# Patient Record
Sex: FEMALE | Race: WHITE | NOT HISPANIC OR LATINO | URBAN - METROPOLITAN AREA
[De-identification: names, ages, dates, MRNs, and addresses within clinical notes are randomized per-mention and may not be internally consistent; named-entity substitution may affect disease eponyms.]

---

## 2017-08-31 ENCOUNTER — EMERGENCY (EMERGENCY)
Facility: HOSPITAL | Age: 82
LOS: 1 days | End: 2017-08-31
Attending: EMERGENCY MEDICINE | Admitting: EMERGENCY MEDICINE
Payer: MEDICARE

## 2017-08-31 VITALS
SYSTOLIC BLOOD PRESSURE: 211 MMHG | HEART RATE: 90 BPM | RESPIRATION RATE: 16 BRPM | TEMPERATURE: 98 F | DIASTOLIC BLOOD PRESSURE: 97 MMHG | OXYGEN SATURATION: 99 %

## 2017-08-31 VITALS
DIASTOLIC BLOOD PRESSURE: 69 MMHG | OXYGEN SATURATION: 96 % | HEART RATE: 76 BPM | SYSTOLIC BLOOD PRESSURE: 175 MMHG | RESPIRATION RATE: 16 BRPM

## 2017-08-31 LAB
ALBUMIN SERPL ELPH-MCNC: 3.9 G/DL — SIGNIFICANT CHANGE UP (ref 3.3–5)
ALP SERPL-CCNC: 109 U/L — SIGNIFICANT CHANGE UP (ref 40–120)
ALT FLD-CCNC: 19 U/L RC — SIGNIFICANT CHANGE UP (ref 10–45)
ANION GAP SERPL CALC-SCNC: 13 MMOL/L — SIGNIFICANT CHANGE UP (ref 5–17)
AST SERPL-CCNC: 21 U/L — SIGNIFICANT CHANGE UP (ref 10–40)
BASOPHILS # BLD AUTO: 0 K/UL — SIGNIFICANT CHANGE UP (ref 0–0.2)
BASOPHILS NFR BLD AUTO: 0.5 % — SIGNIFICANT CHANGE UP (ref 0–2)
BILIRUB SERPL-MCNC: 0.3 MG/DL — SIGNIFICANT CHANGE UP (ref 0.2–1.2)
BUN SERPL-MCNC: 21 MG/DL — SIGNIFICANT CHANGE UP (ref 7–23)
CALCIUM SERPL-MCNC: 9.7 MG/DL — SIGNIFICANT CHANGE UP (ref 8.4–10.5)
CHLORIDE SERPL-SCNC: 104 MMOL/L — SIGNIFICANT CHANGE UP (ref 96–108)
CO2 SERPL-SCNC: 26 MMOL/L — SIGNIFICANT CHANGE UP (ref 22–31)
CREAT SERPL-MCNC: 1 MG/DL — SIGNIFICANT CHANGE UP (ref 0.5–1.3)
EOSINOPHIL # BLD AUTO: 0.1 K/UL — SIGNIFICANT CHANGE UP (ref 0–0.5)
EOSINOPHIL NFR BLD AUTO: 2.6 % — SIGNIFICANT CHANGE UP (ref 0–6)
GLUCOSE SERPL-MCNC: 121 MG/DL — HIGH (ref 70–99)
HCT VFR BLD CALC: 35.1 % — SIGNIFICANT CHANGE UP (ref 34.5–45)
HGB BLD-MCNC: 10.8 G/DL — LOW (ref 11.5–15.5)
LYMPHOCYTES # BLD AUTO: 1.1 K/UL — SIGNIFICANT CHANGE UP (ref 1–3.3)
LYMPHOCYTES # BLD AUTO: 20.7 % — SIGNIFICANT CHANGE UP (ref 13–44)
MCHC RBC-ENTMCNC: 27.4 PG — SIGNIFICANT CHANGE UP (ref 27–34)
MCHC RBC-ENTMCNC: 30.6 GM/DL — LOW (ref 32–36)
MCV RBC AUTO: 89.5 FL — SIGNIFICANT CHANGE UP (ref 80–100)
MONOCYTES # BLD AUTO: 0.6 K/UL — SIGNIFICANT CHANGE UP (ref 0–0.9)
MONOCYTES NFR BLD AUTO: 11.7 % — SIGNIFICANT CHANGE UP (ref 2–14)
NEUTROPHILS # BLD AUTO: 3.4 K/UL — SIGNIFICANT CHANGE UP (ref 1.8–7.4)
NEUTROPHILS NFR BLD AUTO: 64.6 % — SIGNIFICANT CHANGE UP (ref 43–77)
PLATELET # BLD AUTO: 187 K/UL — SIGNIFICANT CHANGE UP (ref 150–400)
POTASSIUM SERPL-MCNC: 4.5 MMOL/L — SIGNIFICANT CHANGE UP (ref 3.5–5.3)
POTASSIUM SERPL-SCNC: 4.5 MMOL/L — SIGNIFICANT CHANGE UP (ref 3.5–5.3)
PROT SERPL-MCNC: 7.2 G/DL — SIGNIFICANT CHANGE UP (ref 6–8.3)
RBC # BLD: 3.92 M/UL — SIGNIFICANT CHANGE UP (ref 3.8–5.2)
RBC # FLD: 12.8 % — SIGNIFICANT CHANGE UP (ref 10.3–14.5)
SODIUM SERPL-SCNC: 143 MMOL/L — SIGNIFICANT CHANGE UP (ref 135–145)
WBC # BLD: 5.3 K/UL — SIGNIFICANT CHANGE UP (ref 3.8–10.5)
WBC # FLD AUTO: 5.3 K/UL — SIGNIFICANT CHANGE UP (ref 3.8–10.5)

## 2017-08-31 PROCEDURE — 72125 CT NECK SPINE W/O DYE: CPT | Mod: 26

## 2017-08-31 PROCEDURE — 80053 COMPREHEN METABOLIC PANEL: CPT

## 2017-08-31 PROCEDURE — 99284 EMERGENCY DEPT VISIT MOD MDM: CPT | Mod: 25

## 2017-08-31 PROCEDURE — 93005 ELECTROCARDIOGRAM TRACING: CPT | Mod: XU

## 2017-08-31 PROCEDURE — 85027 COMPLETE CBC AUTOMATED: CPT

## 2017-08-31 PROCEDURE — 12011 RPR F/E/E/N/L/M 2.5 CM/<: CPT

## 2017-08-31 PROCEDURE — 70450 CT HEAD/BRAIN W/O DYE: CPT

## 2017-08-31 PROCEDURE — 12011 RPR F/E/E/N/L/M 2.5 CM/<: CPT | Mod: GC

## 2017-08-31 PROCEDURE — 72125 CT NECK SPINE W/O DYE: CPT

## 2017-08-31 PROCEDURE — 70450 CT HEAD/BRAIN W/O DYE: CPT | Mod: 26

## 2017-08-31 PROCEDURE — 71045 X-RAY EXAM CHEST 1 VIEW: CPT

## 2017-08-31 PROCEDURE — 90471 IMMUNIZATION ADMIN: CPT

## 2017-08-31 PROCEDURE — 93010 ELECTROCARDIOGRAM REPORT: CPT | Mod: 59

## 2017-08-31 PROCEDURE — 71010: CPT | Mod: 26

## 2017-08-31 PROCEDURE — 90715 TDAP VACCINE 7 YRS/> IM: CPT

## 2017-08-31 RX ORDER — TETANUS TOXOID, REDUCED DIPHTHERIA TOXOID AND ACELLULAR PERTUSSIS VACCINE, ADSORBED 5; 2.5; 8; 8; 2.5 [IU]/.5ML; [IU]/.5ML; UG/.5ML; UG/.5ML; UG/.5ML
0.5 SUSPENSION INTRAMUSCULAR ONCE
Qty: 0 | Refills: 0 | Status: COMPLETED | OUTPATIENT
Start: 2017-08-31 | End: 2017-08-31

## 2017-08-31 RX ADMIN — TETANUS TOXOID, REDUCED DIPHTHERIA TOXOID AND ACELLULAR PERTUSSIS VACCINE, ADSORBED 0.5 MILLILITER(S): 5; 2.5; 8; 8; 2.5 SUSPENSION INTRAMUSCULAR at 09:27

## 2017-08-31 NOTE — ED PROVIDER NOTE - CARE PLAN
Principal Discharge DX:	Closed head injury, initial encounter  Secondary Diagnosis:	Facial laceration, initial encounter  Secondary Diagnosis:	Scalp hematoma, initial encounter Principal Discharge DX:	Closed head injury, initial encounter  Instructions for follow-up, activity and diet:	You may use Tylenol 650mg every 8 hours or Motrin 600mg every 8 hours as needed for pain.     Monitor for worsening headaches, repetitive vomiting, behavior changes or weakness on one side of the body.     You had stitches placed in the ED, please have them removed within 5 days.    Keep the wound dry for 24 hours, then after this you may let water run over the wound but do not scrub it. Observe for signs of infection including spreading redness around the wound, fevers (temperature over 101F), or discharge of pus from the area. Return to the emergency department if these occur  Secondary Diagnosis:	Facial laceration, initial encounter  Secondary Diagnosis:	Scalp hematoma, initial encounter

## 2017-08-31 NOTE — ED PROVIDER NOTE - OBJECTIVE STATEMENT
87F presenting with mechanical fall and head strike from standing. No LOC. No n/v or headaches currently, no weakness of the extremities. No neck pain. Unknown last tetanus.

## 2017-08-31 NOTE — ED PROCEDURE NOTE - ATTENDING CONTRIBUTION TO CARE
Attending MD Orr: Risks, benefit and alternatives of procedure explained to patient and patient demonstrated verbal understanding and consent.  I was present during the key portions of the procedure.

## 2017-08-31 NOTE — ED PROVIDER NOTE - MEDICAL DECISION MAKING DETAILS
Attending MD Orr: 87F with fall, likely mechanical in nature but pt's account of circumstances of fall a bit scant. +frontal scalp hematoma, hypertensive in 200s SBP, will obtain CT head to r/o ICH, EKG to eval for arrhythmia that may have contributing to fall, tele, screening labs to r/o lyte derangement or significant renal insufficiency.

## 2017-08-31 NOTE — ED PROVIDER NOTE - PMH
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

## 2017-08-31 NOTE — ED PROVIDER NOTE - ATTENDING CONTRIBUTION TO CARE
Attending MD Orr:  I personally have seen and examined this patient.  Resident note reviewed and agree on plan of care and except where noted.  See HPI, PE, and MDM for details.

## 2017-08-31 NOTE — ED PROVIDER NOTE - PHYSICAL EXAMINATION
Attending MD Orr: A & O x 3, NAD, +left frontal scalp hematoma, ~5mm linear superficial linear laceration over left superior orbital ridge, chest wall nontender, EOMI b/l, PERRL b/l; lungs CTAB, heart regularly irregular rhythm without murmur; abdomen soft NTND; extremities with no edema; affect appropriate. neuro exam non focal with no motor or sensory deficits. painless AROM of b/l upper and lower extremity joints

## 2017-08-31 NOTE — ED PROVIDER NOTE - PLAN OF CARE
You may use Tylenol 650mg every 8 hours or Motrin 600mg every 8 hours as needed for pain.     Monitor for worsening headaches, repetitive vomiting, behavior changes or weakness on one side of the body.     You had stitches placed in the ED, please have them removed within 5 days.    Keep the wound dry for 24 hours, then after this you may let water run over the wound but do not scrub it. Observe for signs of infection including spreading redness around the wound, fevers (temperature over 101F), or discharge of pus from the area. Return to the emergency department if these occur

## 2017-08-31 NOTE — ED ADULT NURSE NOTE - OBJECTIVE STATEMENT
BIBA from assisted living facility. S/P mechanical fall from standing height. Denies LOC. Laceration noted to next to left eyebrow, bleeding is controlled. Denies any pain, dizziness, lightheadedness, nausea, tingling or other discomfort at this time. Reports she occasionally uses a walker but was not using a walker when she fell this morning. BP noted to be elevated, patient states " I have had labile hypertension for over 20 years". Able to move all extremities.
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
## 2021-07-03 ENCOUNTER — EMERGENCY (EMERGENCY)
Facility: HOSPITAL | Age: 86
LOS: 1 days | Discharge: NURSING, REHAB OR LONG-TERM CARE | End: 2021-07-03
Attending: EMERGENCY MEDICINE
Payer: MEDICARE

## 2021-07-03 VITALS
HEIGHT: 60 IN | HEART RATE: 95 BPM | SYSTOLIC BLOOD PRESSURE: 166 MMHG | TEMPERATURE: 98 F | OXYGEN SATURATION: 100 % | DIASTOLIC BLOOD PRESSURE: 76 MMHG | RESPIRATION RATE: 16 BRPM

## 2021-07-03 LAB
ALBUMIN SERPL ELPH-MCNC: 3.9 G/DL — SIGNIFICANT CHANGE UP (ref 3.3–5)
ALP SERPL-CCNC: 131 U/L — HIGH (ref 40–120)
ALT FLD-CCNC: 9 U/L — LOW (ref 10–45)
ANION GAP SERPL CALC-SCNC: 11 MMOL/L — SIGNIFICANT CHANGE UP (ref 5–17)
APTT BLD: 27.7 SEC — SIGNIFICANT CHANGE UP (ref 27.5–35.5)
AST SERPL-CCNC: 14 U/L — SIGNIFICANT CHANGE UP (ref 10–40)
BASE EXCESS BLDV CALC-SCNC: 2.5 MMOL/L — HIGH (ref -2–2)
BASOPHILS # BLD AUTO: 0.02 K/UL — SIGNIFICANT CHANGE UP (ref 0–0.2)
BASOPHILS NFR BLD AUTO: 0.3 % — SIGNIFICANT CHANGE UP (ref 0–2)
BILIRUB SERPL-MCNC: 0.4 MG/DL — SIGNIFICANT CHANGE UP (ref 0.2–1.2)
BUN SERPL-MCNC: 25 MG/DL — HIGH (ref 7–23)
CALCIUM SERPL-MCNC: 9.5 MG/DL — SIGNIFICANT CHANGE UP (ref 8.4–10.5)
CHLORIDE SERPL-SCNC: 103 MMOL/L — SIGNIFICANT CHANGE UP (ref 96–108)
CO2 BLDV-SCNC: 32 MMOL/L — HIGH (ref 22–30)
CO2 SERPL-SCNC: 25 MMOL/L — SIGNIFICANT CHANGE UP (ref 22–31)
CREAT SERPL-MCNC: 1.09 MG/DL — SIGNIFICANT CHANGE UP (ref 0.5–1.3)
EOSINOPHIL # BLD AUTO: 0.12 K/UL — SIGNIFICANT CHANGE UP (ref 0–0.5)
EOSINOPHIL NFR BLD AUTO: 1.8 % — SIGNIFICANT CHANGE UP (ref 0–6)
GAS PNL BLDV: SIGNIFICANT CHANGE UP
GLUCOSE SERPL-MCNC: 177 MG/DL — HIGH (ref 70–99)
HCO3 BLDV-SCNC: 30 MMOL/L — HIGH (ref 21–29)
HCT VFR BLD CALC: 35.6 % — SIGNIFICANT CHANGE UP (ref 34.5–45)
HGB BLD-MCNC: 11.1 G/DL — LOW (ref 11.5–15.5)
IMM GRANULOCYTES NFR BLD AUTO: 0.5 % — SIGNIFICANT CHANGE UP (ref 0–1.5)
INR BLD: 1.04 RATIO — SIGNIFICANT CHANGE UP (ref 0.88–1.16)
LYMPHOCYTES # BLD AUTO: 1 K/UL — SIGNIFICANT CHANGE UP (ref 1–3.3)
LYMPHOCYTES # BLD AUTO: 15.2 % — SIGNIFICANT CHANGE UP (ref 13–44)
MCHC RBC-ENTMCNC: 28.4 PG — SIGNIFICANT CHANGE UP (ref 27–34)
MCHC RBC-ENTMCNC: 31.2 GM/DL — LOW (ref 32–36)
MCV RBC AUTO: 91 FL — SIGNIFICANT CHANGE UP (ref 80–100)
MONOCYTES # BLD AUTO: 0.55 K/UL — SIGNIFICANT CHANGE UP (ref 0–0.9)
MONOCYTES NFR BLD AUTO: 8.4 % — SIGNIFICANT CHANGE UP (ref 2–14)
NEUTROPHILS # BLD AUTO: 4.86 K/UL — SIGNIFICANT CHANGE UP (ref 1.8–7.4)
NEUTROPHILS NFR BLD AUTO: 73.8 % — SIGNIFICANT CHANGE UP (ref 43–77)
NRBC # BLD: 0 /100 WBCS — SIGNIFICANT CHANGE UP (ref 0–0)
PCO2 BLDV: 63 MMHG — HIGH (ref 35–50)
PH BLDV: 7.3 — LOW (ref 7.35–7.45)
PLATELET # BLD AUTO: 191 K/UL — SIGNIFICANT CHANGE UP (ref 150–400)
PO2 BLDV: 20 MMHG — LOW (ref 25–45)
POTASSIUM SERPL-MCNC: 4.8 MMOL/L — SIGNIFICANT CHANGE UP (ref 3.5–5.3)
POTASSIUM SERPL-SCNC: 4.8 MMOL/L — SIGNIFICANT CHANGE UP (ref 3.5–5.3)
PROT SERPL-MCNC: 7 G/DL — SIGNIFICANT CHANGE UP (ref 6–8.3)
PROTHROM AB SERPL-ACNC: 12.5 SEC — SIGNIFICANT CHANGE UP (ref 10.6–13.6)
RBC # BLD: 3.91 M/UL — SIGNIFICANT CHANGE UP (ref 3.8–5.2)
RBC # FLD: 13.6 % — SIGNIFICANT CHANGE UP (ref 10.3–14.5)
SAO2 % BLDV: 23 % — LOW (ref 67–88)
SARS-COV-2 RNA SPEC QL NAA+PROBE: SIGNIFICANT CHANGE UP
SODIUM SERPL-SCNC: 139 MMOL/L — SIGNIFICANT CHANGE UP (ref 135–145)
TROPONIN T, HIGH SENSITIVITY RESULT: 13 NG/L — SIGNIFICANT CHANGE UP (ref 0–51)
WBC # BLD: 6.58 K/UL — SIGNIFICANT CHANGE UP (ref 3.8–10.5)
WBC # FLD AUTO: 6.58 K/UL — SIGNIFICANT CHANGE UP (ref 3.8–10.5)

## 2021-07-03 PROCEDURE — 70496 CT ANGIOGRAPHY HEAD: CPT | Mod: 26,MA

## 2021-07-03 PROCEDURE — 99220: CPT

## 2021-07-03 PROCEDURE — 70498 CT ANGIOGRAPHY NECK: CPT | Mod: 26,MA

## 2021-07-03 PROCEDURE — 70450 CT HEAD/BRAIN W/O DYE: CPT | Mod: 26,MA,59

## 2021-07-03 RX ORDER — LEVOTHYROXINE SODIUM 125 MCG
112 TABLET ORAL DAILY
Refills: 0 | Status: DISCONTINUED | OUTPATIENT
Start: 2021-07-03 | End: 2021-07-07

## 2021-07-03 RX ORDER — LOSARTAN POTASSIUM 100 MG/1
50 TABLET, FILM COATED ORAL DAILY
Refills: 0 | Status: DISCONTINUED | OUTPATIENT
Start: 2021-07-03 | End: 2021-07-07

## 2021-07-03 RX ORDER — CLOPIDOGREL BISULFATE 75 MG/1
300 TABLET, FILM COATED ORAL ONCE
Refills: 0 | Status: COMPLETED | OUTPATIENT
Start: 2021-07-03 | End: 2021-07-03

## 2021-07-03 RX ORDER — ASPIRIN/CALCIUM CARB/MAGNESIUM 324 MG
81 TABLET ORAL DAILY
Refills: 0 | Status: DISCONTINUED | OUTPATIENT
Start: 2021-07-03 | End: 2021-07-07

## 2021-07-03 RX ORDER — PANTOPRAZOLE SODIUM 20 MG/1
40 TABLET, DELAYED RELEASE ORAL
Refills: 0 | Status: DISCONTINUED | OUTPATIENT
Start: 2021-07-03 | End: 2021-07-07

## 2021-07-03 RX ORDER — ATORVASTATIN CALCIUM 80 MG/1
80 TABLET, FILM COATED ORAL AT BEDTIME
Refills: 0 | Status: DISCONTINUED | OUTPATIENT
Start: 2021-07-03 | End: 2021-07-07

## 2021-07-03 RX ORDER — METOPROLOL TARTRATE 50 MG
50 TABLET ORAL DAILY
Refills: 0 | Status: DISCONTINUED | OUTPATIENT
Start: 2021-07-03 | End: 2021-07-07

## 2021-07-03 RX ADMIN — ATORVASTATIN CALCIUM 80 MILLIGRAM(S): 80 TABLET, FILM COATED ORAL at 22:36

## 2021-07-03 RX ADMIN — CLOPIDOGREL BISULFATE 300 MILLIGRAM(S): 75 TABLET, FILM COATED ORAL at 22:36

## 2021-07-03 RX ADMIN — Medication 81 MILLIGRAM(S): at 22:37

## 2021-07-03 NOTE — ED ADULT NURSE NOTE - OBJECTIVE STATEMENT
Pt A&Ox4. Pt BIB EMS from assisted living for concern of slurred speech and drooling. Per EMS pt was eating dinner and symptoms started 45 min prior to their arrival with an onset of symptoms at 1735. Upon arrival in ED, EMS expressed concern for left sided weakness. During assessment, slight drift noted in upper left extremity. No facial droop or change in speech noted. Sensory intact in all extremities. Code Stroke called and pt taken to CT on EMS stretcher.   Pt denies any CP, SOB, N/V/D, dizziness, LOC, numbness, tingling, fever, chills. Pt was eating dinner when episode happened and denies chocking on any food during the instance.

## 2021-07-03 NOTE — ED CDU PROVIDER DISPOSITION NOTE - ATTENDING CONTRIBUTION TO CARE
Pt observed overnight for TIA, resolved, no events overnight, neuro intact.  Spoke to pt and son Khris Story [phone], aware of pt with TIA, no utility in obtaining MRI as will not , spoke to Stroke team, recommends Plavix for 3wks then just asa solo, keflex for uti, will arrange non-emergent ambulette, stable for dc to [place].

## 2021-07-03 NOTE — ED CDU PROVIDER DISPOSITION NOTE - NSFOLLOWUPCLINICS_GEN_ALL_ED_FT
[facility]  Neurology  [address] [place] [address] [address] [place] [address]  Phone: [phone]  Fax:

## 2021-07-03 NOTE — ED PROVIDER NOTE - OBJECTIVE STATEMENT
89F PMH HTN, HLD, DM2 89F PMH HTN, HLD, DM2, hypothyroidism who is BIBA for stroke-like sx. Pt was eating dinner at her facility when at 5:35pm she began to have drooling at the mouth, facial droop, slurred speech and was c/o arm pain. Episode was witnessed by staff. Per EMS, they noted some L-sided arm weakness on arrival. Pt states sx are better upon arrival to ED. CODE STROKE called upon arrival in ED.

## 2021-07-03 NOTE — ED ADULT NURSE NOTE - NSIMPLEMENTINTERV_GEN_ALL_ED
Implemented All Fall with Harm Risk Interventions:  [place] to call system. Call bell, personal items and telephone within reach. Instruct patient to call for assistance. Room bathroom lighting operational. Non-slip footwear when patient is off stretcher. Physically safe environment: no spills, clutter or unnecessary equipment. Stretcher in lowest position, wheels locked, appropriate side rails in place. Provide visual cue, wrist band, yellow gown, etc. Monitor gait and stability. Monitor for mental status changes and reorient to person, place, and time. Review medications for side effects contributing to fall risk. Reinforce activity limits and safety measures with patient and family. Provide visual clues: red socks.

## 2021-07-03 NOTE — ED CDU PROVIDER DISPOSITION NOTE - NSFOLLOWUPINSTRUCTIONS_ED_ALL_ED_FT
Follow up with your primary care doctor within 1 week    Follow up with Neurology in 1-5 days - see attached contact info.  *Bring all attached lab/test results.*    Stay well hydrated.  Continue current home medications  Please begin taking clopidogrel (Plavix) 75mg once daily    Read stroke education material given to you.      ---------------------------------------------------------------------------  Return if symptoms worsen, fever, weakness, numbness, dizziness, vision change, slurred speech and all other concerns     Stroke Prevention  Some medical conditions and behaviors are associated with a higher chance of having a stroke. You can help prevent a stroke by making nutrition, lifestyle, and other changes, including managing any medical conditions you may have.  WHAT NUTRITION CHANGES CAN BE MADE?  Eat healthy foods. You can do this by:  · Choosing foods high in fiber, such as fresh fruits and vegetables and whole grains.  · Eating at least 5 or more servings of fruits and vegetables a day. Try to fill half of your plate at each meal with fruits and vegetables.  · Choosing lean protein foods, such as lean cuts of meat, poultry without skin, fish, tofu, beans, and nuts.  · Eating low-fat dairy products.  · Avoiding foods that are high in salt (sodium). This can help lower blood pressure.  · Avoiding foods that have saturated fat, trans fat, and cholesterol. This can help prevent high cholesterol.  · Avoiding processed and premade foods.  Follow your health care provider's specific guidelines for losing weight, controlling high blood pressure (hypertension), lowering high cholesterol, and managing diabetes. These may include:  · Reducing your daily calorie intake.  · Limiting your daily sodium intake to 1,500 milligrams (mg).  · Using only healthy fats for cooking, such as olive oil, canola oil, or sunflower oil.  · Counting your daily carbohydrate intake.  WHAT LIFESTYLE CHANGES CAN BE MADE?  Maintain a healthy weight. Talk to your health care provider about your ideal weight.  Get at least 30 minutes of moderate physical activity at least 5 days a week. Moderate activity includes brisk walking, biking, and swimming.  Do not use any products that contain nicotine or tobacco, such as cigarettes and e-cigarettes. If you need  help quitting, ask your health care provider. It may also be helpful to avoid exposure to secondhand smoke.  Limit alcohol intake to no more than 1 drink a day for nonpregnant women and 2 drinks a day for men. One  drink equals 12 oz of beer, 5 oz of wine, or 1½ oz of hard liquor.  Stop any illegal drug use.      Avoid taking birth control pills. Talk to your health care provider about the risks of taking birth control pills if:  · You are over 35 years old.  · You smoke.  · You get migraines.  · You have ever had a blood clot.  WHAT OTHER CHANGES CAN BE MADE?  Manage your cholesterol levels.  · Eating a healthy diet is important for preventing high cholesterol. If cholesterol cannot be managed  through diet alone, you may also need to take medicines.  · Take any prescribed medicines to control your cholesterol as told by your health care provider.  Manage your diabetes.  · Eating a healthy diet and exercising regularly are important parts of managing your blood sugar. If your  blood sugar cannot be managed through diet and exercise, you may need to take medicines.  · Take any prescribed medicines to control your diabetes as told by your health care provider.  Control your hypertension.  · To reduce your risk of stroke, try to keep your blood pressure below 130/80.  · Eating a healthy diet and exercising regularly are an important part of controlling your blood pressure. If  your blood pressure cannot be managed through diet and exercise, you may need to take medicines.  · Take any prescribed medicines to control hypertension as told by your health care provider.  · Ask your health care provider if you should monitor your blood pressure at home.  · Have your blood pressure checked every year, even if your blood pressure is normal. Blood pressure  increases with age and some medical conditions.  Get evaluated for sleep disorders (sleep apnea). Talk to your health care provider about getting a sleep evaluation if you snore a lot or have excessive sleepiness.  Take over-the-counter and prescription medicines only as told by your health care provider. Aspirin or blood thinners (antiplatelets or anticoagulants) may be recommended to reduce your risk of forming blood clots that can lead to stroke.  Make sure that any other medical conditions you have, such as atrial fibrillation or atherosclerosis, are  managed.  WHAT ARE THE WARNING SIGNS OF A STROKE?  The warning signs of a stroke can be easily remembered as BEFAST.  B is for balance. Signs include:  · Dizziness.  · Loss of balance or coordination.  · Sudden trouble walking.  E is for eyes. Signs include:  · A sudden change in vision.  · Trouble seeing.  F is for face. Signs include:  · Sudden weakness or numbness of the face.  · The face or eyelid drooping to one side.  A is for arms. Signs include:  · Sudden weakness or numbness of the arm, usually on one side of the body.  S is for speech. Signs include:  · Trouble speaking (aphasia).  · Trouble understanding.  T is for time.      · These symptoms may represent a serious problem that is an emergency. Do not wait to see if the symptoms will go away. Get medical help right away. Call your local emergency services (911 in the [place] Do not drive yourself to the hospital.  Other signs of stroke may include:  · A sudden, severe headache with no known cause.  · Nausea or vomiting.  · Seizure.    WHERE TO FIND MORE INFORMATION  For more information, visit:  American Stroke Association: www.strokeassociation.org  National Stroke Association: www.stroke.org  SUMMARY  You can prevent a stroke by eating healthy, exercising, not smoking, limiting alcohol intake, and managing  any medical conditions you may have.  Do not use any products that contain nicotine or tobacco, such as cigarettes and e-cigarettes. If you need  help quitting, ask your health care provider. It may also be helpful to avoid exposure to secondhand smoke.  Remember BEFAST for warning signs of stroke. Get help right away if you or a loved one has any of these  signs.  ADDITIONAL NOTES AND INSTRUCTIONS  Please follow up with your Primary MD in 24-48 hr.  Seek immediate medical care for any new/worsening signs or symptoms. Follow up with your primary care doctor within 1 week    Follow up with Neurology in 1-5 days - see attached contact info.  *Bring all attached lab/test results.*    Stay well hydrated.  Continue current home medications  Please begin taking clopidogrel (Plavix) 75mg once daily  Take cephalexin as prescribed for your urinary tract infection.    Read stroke education material given to you.      ---------------------------------------------------------------------------  Return if symptoms worsen, fever, weakness, numbness, dizziness, vision change, slurred speech and all other concerns     Stroke Prevention  Some medical conditions and behaviors are associated with a higher chance of having a stroke. You can help prevent a stroke by making nutrition, lifestyle, and other changes, including managing any medical conditions you may have.  WHAT NUTRITION CHANGES CAN BE MADE?  Eat healthy foods. You can do this by:  · Choosing foods high in fiber, such as fresh fruits and vegetables and whole grains.  · Eating at least 5 or more servings of fruits and vegetables a day. Try to fill half of your plate at each meal with fruits and vegetables.  · Choosing lean protein foods, such as lean cuts of meat, poultry without skin, fish, tofu, beans, and nuts.  · Eating low-fat dairy products.  · Avoiding foods that are high in salt (sodium). This can help lower blood pressure.  · Avoiding foods that have saturated fat, trans fat, and cholesterol. This can help prevent high cholesterol.  · Avoiding processed and premade foods.  Follow your health care provider's specific guidelines for losing weight, controlling high blood pressure (hypertension), lowering high cholesterol, and managing diabetes. These may include:  · Reducing your daily calorie intake.  · Limiting your daily sodium intake to 1,500 milligrams (mg).  · Using only healthy fats for cooking, such as olive oil, canola oil, or sunflower oil.  · Counting your daily carbohydrate intake.  WHAT LIFESTYLE CHANGES CAN BE MADE?  Maintain a healthy weight. Talk to your health care provider about your ideal weight.  Get at least 30 minutes of moderate physical activity at least 5 days a week. Moderate activity includes brisk walking, biking, and swimming.  Do not use any products that contain nicotine or tobacco, such as cigarettes and e-cigarettes. If you need  help quitting, ask your health care provider. It may also be helpful to avoid exposure to secondhand smoke.  Limit alcohol intake to no more than 1 drink a day for nonpregnant women and 2 drinks a day for men. One  drink equals 12 oz of beer, 5 oz of wine, or 1½ oz of hard liquor.  Stop any illegal drug use.      Avoid taking birth control pills. Talk to your health care provider about the risks of taking birth control pills if:  · You are over 35 years old.  · You smoke.  · You get migraines.  · You have ever had a blood clot.  WHAT OTHER CHANGES CAN BE MADE?  Manage your cholesterol levels.  · Eating a healthy diet is important for preventing high cholesterol. If cholesterol cannot be managed  through diet alone, you may also need to take medicines.  · Take any prescribed medicines to control your cholesterol as told by your health care provider.  Manage your diabetes.  · Eating a healthy diet and exercising regularly are important parts of managing your blood sugar. If your  blood sugar cannot be managed through diet and exercise, you may need to take medicines.  · Take any prescribed medicines to control your diabetes as told by your health care provider.  Control your hypertension.  · To reduce your risk of stroke, try to keep your blood pressure below 130/80.  · Eating a healthy diet and exercising regularly are an important part of controlling your blood pressure. If  your blood pressure cannot be managed through diet and exercise, you may need to take medicines.  · Take any prescribed medicines to control hypertension as told by your health care provider.  · Ask your health care provider if you should monitor your blood pressure at home.  · Have your blood pressure checked every year, even if your blood pressure is normal. Blood pressure  increases with age and some medical conditions.  Get evaluated for sleep disorders (sleep apnea). Talk to your health care provider about getting a sleep evaluation if you snore a lot or have excessive sleepiness.  Take over-the-counter and prescription medicines only as told by your health care provider. Aspirin or blood thinners (antiplatelets or anticoagulants) may be recommended to reduce your risk of forming blood clots that can lead to stroke.  Make sure that any other medical conditions you have, such as atrial fibrillation or atherosclerosis, are  managed.  WHAT ARE THE WARNING SIGNS OF A STROKE?  The warning signs of a stroke can be easily remembered as BEFAST.  B is for balance. Signs include:  · Dizziness.  · Loss of balance or coordination.  · Sudden trouble walking.  E is for eyes. Signs include:  · A sudden change in vision.  · Trouble seeing.  F is for face. Signs include:  · Sudden weakness or numbness of the face.  · The face or eyelid drooping to one side.  A is for arms. Signs include:  · Sudden weakness or numbness of the arm, usually on one side of the body.  S is for speech. Signs include:  · Trouble speaking (aphasia).  · Trouble understanding.  T is for time.      · These symptoms may represent a serious problem that is an emergency. Do not wait to see if the symptoms will go away. Get medical help right away. Call your local emergency services (911 in the [place] Do not drive yourself to the hospital.  Other signs of stroke may include:  · A sudden, severe headache with no known cause.  · Nausea or vomiting.  · Seizure.    WHERE TO FIND MORE INFORMATION  For more information, visit:  American Stroke Association: www.strokeassociation.org  National Stroke Association: www.stroke.org  SUMMARY  You can prevent a stroke by eating healthy, exercising, not smoking, limiting alcohol intake, and managing  any medical conditions you may have.  Do not use any products that contain nicotine or tobacco, such as cigarettes and e-cigarettes. If you need  help quitting, ask your health care provider. It may also be helpful to avoid exposure to secondhand smoke.  Remember BEFAST for warning signs of stroke. Get help right away if you or a loved one has any of these  signs.  ADDITIONAL NOTES AND INSTRUCTIONS  Please follow up with your Primary MD in 24-48 hr.  Seek immediate medical care for any new/worsening signs or symptoms. Follow up with your primary care doctor within 1 week.    Follow up with Neurology within 1 week.    [facility]  Neurology  [address] [place] [address] [address] [place] [address]  Phone: [phone]    *Bring all attached lab/test results.*    Take Plavix 75mg once daily for 3 weeks.   Take Keflex 500mg twice daily for 7 days for UTI.  Continue Lipitor as prescribed.  Continue to take all other medications as prescribed.  Stay well hydrated.

## 2021-07-03 NOTE — ED CDU PROVIDER INITIAL DAY NOTE - MEDICAL DECISION MAKING DETAILS
ANTONIO Moran MD: ANTONIO Moran MD: 89F PMH HTN, HLD, DM2, hypothyroidism who is BIBA for drooling at the mouth, facial droop, slurred speech and c/o arm pain, witnessed by staff, at 5:35pm. Sx appeared to mostly resolve upon arrival. CODE STROKE called, initial imaging negative. Neurology recommends CDU for MRI, neurochecks, initiation of antiplatelet therapy

## 2021-07-03 NOTE — ED ADULT NURSE NOTE - CAS EDP DISCH TYPE
Assisted living facility [facility] Assisting Living/Assisted living facility

## 2021-07-03 NOTE — ED PROVIDER NOTE - PROGRESS NOTE DETAILS
ANTONIO Moran MD: Per neuro, pt with either TIA or v. small infarct. They recommend antiplatelet therapy (recs to follow) and CDU for MRI. Per EMS, pt ambulates with assistance at baseline.

## 2021-07-03 NOTE — CONSULT NOTE ADULT - ATTENDING COMMENTS
VASCULAR NEUROLOGY ATTENDING  The patient is seen and examined the history and imaging are reviewed. I agree with the resident note unless otherwise noted. Possible TIA. Agree with DAPT per [facility]. After 3 weeks ASA alone. Outpatient long term telemetry monitoring to r/o AF. Outpatient neurology follow-up.

## 2021-07-03 NOTE — ED CDU PROVIDER DISPOSITION NOTE - PATIENT PORTAL LINK FT
You can access the FollowMyHealth Patient Portal offered by [facility] by registering at the following website: [email or web address]. By joining [facility]’s FollowMyHealth portal, you will also be able to view your health information using other applications (apps) compatible with our system.

## 2021-07-03 NOTE — ED ADULT NURSE NOTE - NSFALLRSKOUTCOME_ED_ALL_ED
Addended by: Morgan Sandoval on: 4/13/2017 01:18 PM     Modules accepted: Kalpana Fall with Harm Risk

## 2021-07-03 NOTE — CONSULT NOTE ADULT - ASSESSMENT
89 year old female with history of HTN, HLD, DM, CAD s/p CABGx3 (2010), h/o CEA, hypothyroidism, BIBEMS from [facility] due to acute onset left sided facial droop, left sided weakness, slurred speech. Last known well 5:30PM 7/3/21. Code stroke activated 19:13.    Impression:  Left hemiparesis likely 2/2 right brain dysfunction 2/2 minor acute ischemic stroke vs TIA. Mechanism unknown at this time.     Recommendations:  [] Admit to CDU.  [x] Passed dysphagia screen in ED.   [] Keep BP permissive up to 220/110 for now.  [] MRI brain without contrast.  [] TTE, can be done outpatient.  [] Continue with home dose ASA 81 mg PO.  [] Load with plavix 300mg PO x1, then start plavix 75mg daily for 3 weeks as per [facility] trial protocol.   [] Start Atorvastatin 80mg PO qhs (titrate to LDL < 70).  [] Send lipid panel, HbA1c.   [] Rest of care per CDU team.     Patient discussed with stroke fellow Dr. Carrillo. Final recommendations regarding management to be confirmed upon review by stroke attending Dr. Howe.
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
## 2021-07-03 NOTE — ED PROVIDER NOTE - CLINICAL SUMMARY MEDICAL DECISION MAKING FREE TEXT BOX
ANTONIO Moran MD: 89F PMH HTN, HLD, DM2, hypothyroidism who is BIBA for drooling at the mouth, facial droop, slurred speech and c/o arm pain, witnessed by staff, at 5:35pm. Sx appeared to mostly resolve upon arrival. CODE STROKE called, initial imaging negative. Neurology recommends CDU for MRI, neurochecks, initiation of antiplatelet therapy

## 2021-07-03 NOTE — ED CDU PROVIDER INITIAL DAY NOTE - DETAILS
facial droop, slurred speech:  -q4 neuro checks, MRI head, tele, lipid/a1c, neurology following    d/w ED Team

## 2021-07-04 VITALS
RESPIRATION RATE: 20 BRPM | TEMPERATURE: 98 F | DIASTOLIC BLOOD PRESSURE: 61 MMHG | SYSTOLIC BLOOD PRESSURE: 152 MMHG | HEART RATE: 73 BPM | OXYGEN SATURATION: 97 %

## 2021-07-04 LAB
A1C WITH ESTIMATED AVERAGE GLUCOSE RESULT: 5.8 % — HIGH (ref 4–5.6)
APPEARANCE UR: CLEAR — SIGNIFICANT CHANGE UP
BACTERIA # UR AUTO: ABNORMAL
BILIRUB UR-MCNC: NEGATIVE — SIGNIFICANT CHANGE UP
CHOLEST SERPL-MCNC: 124 MG/DL — SIGNIFICANT CHANGE UP
COLOR SPEC: SIGNIFICANT CHANGE UP
DIFF PNL FLD: NEGATIVE — SIGNIFICANT CHANGE UP
EPI CELLS # UR: 2 /HPF — SIGNIFICANT CHANGE UP
ESTIMATED AVERAGE GLUCOSE: 120 MG/DL — HIGH (ref 68–114)
GLUCOSE UR QL: NEGATIVE — SIGNIFICANT CHANGE UP
HDLC SERPL-MCNC: 51 MG/DL — SIGNIFICANT CHANGE UP
HYALINE CASTS # UR AUTO: 1 /LPF — SIGNIFICANT CHANGE UP (ref 0–2)
KETONES UR-MCNC: NEGATIVE — SIGNIFICANT CHANGE UP
LEUKOCYTE ESTERASE UR-ACNC: ABNORMAL
LIPID PNL WITH DIRECT LDL SERPL: 58 MG/DL — SIGNIFICANT CHANGE UP
NITRITE UR-MCNC: POSITIVE
NON HDL CHOLESTEROL: 73 MG/DL — SIGNIFICANT CHANGE UP
PH UR: 6.5 — SIGNIFICANT CHANGE UP (ref 5–8)
PROT UR-MCNC: SIGNIFICANT CHANGE UP
RBC CASTS # UR COMP ASSIST: 6 /HPF — HIGH (ref 0–4)
SP GR SPEC: 1.04 — HIGH (ref 1.01–1.02)
TRIGL SERPL-MCNC: 75 MG/DL — SIGNIFICANT CHANGE UP
UROBILINOGEN FLD QL: NEGATIVE — SIGNIFICANT CHANGE UP
WBC UR QL: 11 /HPF — HIGH (ref 0–5)

## 2021-07-04 PROCEDURE — 87077 CULTURE AEROBIC IDENTIFY: CPT

## 2021-07-04 PROCEDURE — 81001 URINALYSIS AUTO W/SCOPE: CPT

## 2021-07-04 PROCEDURE — 80061 LIPID PANEL: CPT

## 2021-07-04 PROCEDURE — 85610 PROTHROMBIN TIME: CPT

## 2021-07-04 PROCEDURE — 70498 CT ANGIOGRAPHY NECK: CPT

## 2021-07-04 PROCEDURE — 70496 CT ANGIOGRAPHY HEAD: CPT

## 2021-07-04 PROCEDURE — 99217: CPT

## 2021-07-04 PROCEDURE — 87186 SC STD MICRODIL/AGAR DIL: CPT

## 2021-07-04 PROCEDURE — U0003: CPT

## 2021-07-04 PROCEDURE — G0378: CPT

## 2021-07-04 PROCEDURE — 70450 CT HEAD/BRAIN W/O DYE: CPT

## 2021-07-04 PROCEDURE — 99285 EMERGENCY DEPT VISIT HI MDM: CPT | Mod: 25

## 2021-07-04 PROCEDURE — 85730 THROMBOPLASTIN TIME PARTIAL: CPT

## 2021-07-04 PROCEDURE — 93005 ELECTROCARDIOGRAM TRACING: CPT

## 2021-07-04 PROCEDURE — 82803 BLOOD GASES ANY COMBINATION: CPT

## 2021-07-04 PROCEDURE — U0005: CPT

## 2021-07-04 PROCEDURE — 80053 COMPREHEN METABOLIC PANEL: CPT

## 2021-07-04 PROCEDURE — 84484 ASSAY OF TROPONIN QUANT: CPT

## 2021-07-04 PROCEDURE — 82962 GLUCOSE BLOOD TEST: CPT

## 2021-07-04 PROCEDURE — 85025 COMPLETE CBC W/AUTO DIFF WBC: CPT

## 2021-07-04 PROCEDURE — 83036 HEMOGLOBIN GLYCOSYLATED A1C: CPT

## 2021-07-04 PROCEDURE — 87086 URINE CULTURE/COLONY COUNT: CPT

## 2021-07-04 RX ORDER — CEPHALEXIN 500 MG
1 CAPSULE ORAL
Qty: 14 | Refills: 0
Start: 2021-07-04 | End: 2021-07-10

## 2021-07-04 RX ORDER — CLOPIDOGREL BISULFATE 75 MG/1
1 TABLET, FILM COATED ORAL
Qty: 21 | Refills: 0
Start: 2021-07-04 | End: 2021-07-24

## 2021-07-04 RX ORDER — DIPHENHYDRAMINE HCL 50 MG
25 CAPSULE ORAL ONCE
Refills: 0 | Status: COMPLETED | OUTPATIENT
Start: 2021-07-04 | End: 2021-07-04

## 2021-07-04 RX ORDER — LANOLIN ALCOHOL/MO/W.PET/CERES
5 CREAM (GRAM) TOPICAL ONCE
Refills: 0 | Status: COMPLETED | OUTPATIENT
Start: 2021-07-04 | End: 2021-07-04

## 2021-07-04 RX ORDER — CEPHALEXIN 500 MG
500 CAPSULE ORAL EVERY 12 HOURS
Refills: 0 | Status: DISCONTINUED | OUTPATIENT
Start: 2021-07-04 | End: 2021-07-07

## 2021-07-04 RX ORDER — LANOLIN ALCOHOL/MO/W.PET/CERES
5 CREAM (GRAM) TOPICAL AT BEDTIME
Refills: 0 | Status: DISCONTINUED | OUTPATIENT
Start: 2021-07-04 | End: 2021-07-04

## 2021-07-04 RX ADMIN — Medication 25 MILLIGRAM(S): at 02:19

## 2021-07-04 RX ADMIN — Medication 50 MILLIGRAM(S): at 06:51

## 2021-07-04 RX ADMIN — Medication 112 MICROGRAM(S): at 06:51

## 2021-07-04 RX ADMIN — PANTOPRAZOLE SODIUM 40 MILLIGRAM(S): 20 TABLET, DELAYED RELEASE ORAL at 08:50

## 2021-07-04 RX ADMIN — Medication 81 MILLIGRAM(S): at 12:19

## 2021-07-04 RX ADMIN — Medication 500 MILLIGRAM(S): at 06:51

## 2021-07-04 RX ADMIN — LOSARTAN POTASSIUM 50 MILLIGRAM(S): 100 TABLET, FILM COATED ORAL at 06:52

## 2021-07-04 NOTE — ED CDU PROVIDER SUBSEQUENT DAY NOTE - ATTENDING CONTRIBUTION TO CARE
Pt observed overnight for TIA, resolved, no events overnight, neuro intact.  Spoke to pt and son Khris Story [phone], aware of pt with TIA, no utility in obtaining MRI as will not , spoke to Stroke team, recommends Plavix for 3wks then just asa solo, keflex for uti, will arrange non-emergent ambulette, stable for dc to [place].

## 2021-07-04 NOTE — ED CDU PROVIDER SUBSEQUENT DAY NOTE - PROGRESS NOTE DETAILS
Patient seen at bedside in NAD. VSS. Patient resting comfortably without complaints. No events on tele. No neuro deficits. MRI DC'd as symptoms now resolved and no plan for intervention given pos findings, attending s/w son. DC on plavix x 3 weeks and Keflex for UTI, pharmacy confirmed w/ assisted living.

## 2021-07-04 NOTE — ED CDU PROVIDER SUBSEQUENT DAY NOTE - HISTORY
CDU PROGRESS NOTE DAMARIS RODRIGUEZ: No interval change. Pt resting comfortably without complaint. NAD. VSS. neuro exam unchanged slight LUE lift. no events on tele. pending MRI. Will continue to monitor. CDU PROGRESS NOTE DAMARIS RODRIGUEZ: No interval change. Pt resting comfortably without complaint. NAD. VSS. neuro exam unchanged slight LUE drift. no events on tele. pending MRI. Will continue to monitor.

## 2021-07-04 NOTE — ED ADULT NURSE REASSESSMENT NOTE - NS ED NURSE REASSESS COMMENT FT1
Patient currently resting comfortably with no complaints or requests at this time, no change in mentation. IVF infusing, IV catheter patent, no sings of infection or infiltration Food provided. well tolerated. . Pt. has no complaints, pain, or discomfort at this time. Pt vernalized understating of plan of care, all questions answered. Plan of care explained. Bed locked and in lowest position with call bell within reach, rails up. Comfort and safety provided. Patient currently resting comfortably with no complaints or requests at this time, no change in mentation. IVF infusing, IV catheter patent, no sings of infection or infiltration Food provided. well tolerated. . Pt. has no complaints, pain, or discomfort at this time. Pt verbalized understating of plan of care, all questions answered. Plan of care explained. Bed locked and in lowest position with call bell within reach, rails up. Comfort and safety provided.

## 2021-07-04 NOTE — ED ADULT NURSE REASSESSMENT NOTE - NS ED NURSE REASSESS COMMENT FT1
Report received from Regina FREEMAN  07.00 AM. Patient is well appearing, AAOx3, resp nonlabored, skin warm/dry skin warm and intact, PERRL, EOM intact, sensory intact, equal strength b/l in all upper and lower extremities.  Pt denies headache, dizziness, chest pain, palpitations, SOB, abd pain, n/v/d, urinary symptoms, fevers, chills, weakness at this time.  Pt awaiting for MRI.  Comfort care & safety measures continued  IV site looks clean & dry no signs of infiltration noted pt denies  pain IV site . Pt is advised to call for help  call bell with in the reach pt verbalized the understanding.  Continue to monitor. Report received from Regina FREEMAN  07.00 AM. Patient is well appearing, AAOx3, resp nonlabored, skin warm/dry skin warm and intact, PERRL, EOM intact, sensory intact, equal strength b/l in all upper and lower extremities.  Pt denies headache, dizziness, chest pain, palpitations, SOB, abd pain, n/v/d, chills, weakness at this time.  Pt awaiting for MRI.  Comfort care & safety measures continued  IV site looks clean & dry no signs of infiltration noted pt denies  pain IV site . Pt is advised to call for help  call bell with in the reach pt verbalized the understanding.  Continue to monitor.

## 2021-07-06 RX ORDER — CEFPODOXIME PROXETIL 100 MG
1 TABLET ORAL
Qty: 4 | Refills: 0
Start: 2021-07-06 | End: 2021-07-06

## 2021-07-06 RX ORDER — CEFPODOXIME PROXETIL 100 MG
1 TABLET ORAL
Qty: 14 | Refills: 0
Start: 2021-07-06 | End: 2021-07-12

## 2021-07-06 NOTE — ED POST DISCHARGE NOTE - RESULT SUMMARY
7/6/21: Ucx > 100K enterococcus, pt on keflex which is resistant 7/6/21: Ucx > 100K enterobacter pt on keflex which is resistant

## 2021-07-06 NOTE — ED POST DISCHARGE NOTE - DETAILS
7//: spoke with pts son and emergency contact Norris Montilla discussed will need to switch to cefopodime which her culture is sensitive. they note understanding, will start today and bring back to the ED for any fevers, chills, worsening/concerning symptoms. -Vannesa Jacobo PA-C

## 2022-02-06 ENCOUNTER — EMERGENCY (EMERGENCY)
Facility: HOSPITAL | Age: 87
LOS: 1 days | Discharge: HOME | End: 2022-02-06
Attending: EMERGENCY MEDICINE | Admitting: EMERGENCY MEDICINE
Payer: MEDICARE

## 2022-02-06 VITALS
HEIGHT: 61 IN | SYSTOLIC BLOOD PRESSURE: 206 MMHG | HEART RATE: 69 BPM | RESPIRATION RATE: 20 BRPM | TEMPERATURE: 98 F | DIASTOLIC BLOOD PRESSURE: 76 MMHG | OXYGEN SATURATION: 98 % | WEIGHT: 126.1 LBS

## 2022-02-06 LAB
ALBUMIN SERPL ELPH-MCNC: 3.4 G/DL — SIGNIFICANT CHANGE UP (ref 3.3–5)
ALP SERPL-CCNC: 116 U/L — SIGNIFICANT CHANGE UP (ref 40–120)
ALT FLD-CCNC: 15 U/L — SIGNIFICANT CHANGE UP (ref 10–45)
ANION GAP SERPL CALC-SCNC: 7 MMOL/L — SIGNIFICANT CHANGE UP (ref 5–17)
AST SERPL-CCNC: 17 U/L — SIGNIFICANT CHANGE UP (ref 10–40)
BASOPHILS # BLD AUTO: 0.03 K/UL — SIGNIFICANT CHANGE UP (ref 0–0.2)
BASOPHILS NFR BLD AUTO: 0.3 % — SIGNIFICANT CHANGE UP (ref 0–2)
BILIRUB SERPL-MCNC: 0.7 MG/DL — SIGNIFICANT CHANGE UP (ref 0.2–1.2)
BUN SERPL-MCNC: 26 MG/DL — HIGH (ref 7–23)
CALCIUM SERPL-MCNC: 8.9 MG/DL — SIGNIFICANT CHANGE UP (ref 8.4–10.5)
CHLORIDE SERPL-SCNC: 106 MMOL/L — SIGNIFICANT CHANGE UP (ref 96–108)
CO2 SERPL-SCNC: 27 MMOL/L — SIGNIFICANT CHANGE UP (ref 22–31)
CREAT SERPL-MCNC: 1.06 MG/DL — SIGNIFICANT CHANGE UP (ref 0.5–1.3)
EOSINOPHIL # BLD AUTO: 0.04 K/UL — SIGNIFICANT CHANGE UP (ref 0–0.5)
EOSINOPHIL NFR BLD AUTO: 0.5 % — SIGNIFICANT CHANGE UP (ref 0–6)
GLUCOSE SERPL-MCNC: 138 MG/DL — HIGH (ref 70–99)
HCT VFR BLD CALC: 32.5 % — LOW (ref 34.5–45)
HGB BLD-MCNC: 10.4 G/DL — LOW (ref 11.5–15.5)
IMM GRANULOCYTES NFR BLD AUTO: 0.8 % — SIGNIFICANT CHANGE UP (ref 0–1.5)
LYMPHOCYTES # BLD AUTO: 0.7 K/UL — LOW (ref 1–3.3)
LYMPHOCYTES # BLD AUTO: 7.9 % — LOW (ref 13–44)
MCHC RBC-ENTMCNC: 29.1 PG — SIGNIFICANT CHANGE UP (ref 27–34)
MCHC RBC-ENTMCNC: 32 GM/DL — SIGNIFICANT CHANGE UP (ref 32–36)
MCV RBC AUTO: 91 FL — SIGNIFICANT CHANGE UP (ref 80–100)
MONOCYTES # BLD AUTO: 0.4 K/UL — SIGNIFICANT CHANGE UP (ref 0–0.9)
MONOCYTES NFR BLD AUTO: 4.5 % — SIGNIFICANT CHANGE UP (ref 2–14)
NEUTROPHILS # BLD AUTO: 7.61 K/UL — HIGH (ref 1.8–7.4)
NEUTROPHILS NFR BLD AUTO: 86 % — HIGH (ref 43–77)
NRBC # BLD: 0 /100 WBCS — SIGNIFICANT CHANGE UP (ref 0–0)
PLATELET # BLD AUTO: 167 K/UL — SIGNIFICANT CHANGE UP (ref 150–400)
POTASSIUM SERPL-MCNC: 4.3 MMOL/L — SIGNIFICANT CHANGE UP (ref 3.5–5.3)
POTASSIUM SERPL-SCNC: 4.3 MMOL/L — SIGNIFICANT CHANGE UP (ref 3.5–5.3)
PROT SERPL-MCNC: 6.9 G/DL — SIGNIFICANT CHANGE UP (ref 6–8.3)
RBC # BLD: 3.57 M/UL — LOW (ref 3.8–5.2)
RBC # FLD: 13.3 % — SIGNIFICANT CHANGE UP (ref 10.3–14.5)
SODIUM SERPL-SCNC: 140 MMOL/L — SIGNIFICANT CHANGE UP (ref 135–145)
TROPONIN I, HIGH SENSITIVITY RESULT: 5.2 NG/L — SIGNIFICANT CHANGE UP
TROPONIN I, HIGH SENSITIVITY RESULT: 6.1 NG/L — SIGNIFICANT CHANGE UP
WBC # BLD: 8.85 K/UL — SIGNIFICANT CHANGE UP (ref 3.8–10.5)
WBC # FLD AUTO: 8.85 K/UL — SIGNIFICANT CHANGE UP (ref 3.8–10.5)

## 2022-02-06 PROCEDURE — 36415 COLL VENOUS BLD VENIPUNCTURE: CPT

## 2022-02-06 PROCEDURE — 71045 X-RAY EXAM CHEST 1 VIEW: CPT | Mod: 26

## 2022-02-06 PROCEDURE — 70450 CT HEAD/BRAIN W/O DYE: CPT | Mod: MA

## 2022-02-06 PROCEDURE — 93005 ELECTROCARDIOGRAM TRACING: CPT

## 2022-02-06 PROCEDURE — 99285 EMERGENCY DEPT VISIT HI MDM: CPT | Mod: 25

## 2022-02-06 PROCEDURE — 80053 COMPREHEN METABOLIC PANEL: CPT

## 2022-02-06 PROCEDURE — 85025 COMPLETE CBC W/AUTO DIFF WBC: CPT

## 2022-02-06 PROCEDURE — 70450 CT HEAD/BRAIN W/O DYE: CPT | Mod: 26,MA

## 2022-02-06 PROCEDURE — 93010 ELECTROCARDIOGRAM REPORT: CPT

## 2022-02-06 PROCEDURE — 84484 ASSAY OF TROPONIN QUANT: CPT

## 2022-02-06 PROCEDURE — 71045 X-RAY EXAM CHEST 1 VIEW: CPT

## 2022-02-06 PROCEDURE — 99285 EMERGENCY DEPT VISIT HI MDM: CPT

## 2022-02-06 RX ORDER — MECLIZINE HCL 12.5 MG
1 TABLET ORAL
Qty: 15 | Refills: 0
Start: 2022-02-06 | End: 2022-02-10

## 2022-02-06 RX ORDER — MECLIZINE HCL 12.5 MG
50 TABLET ORAL ONCE
Refills: 0 | Status: DISCONTINUED | OUTPATIENT
Start: 2022-02-06 | End: 2022-02-06

## 2022-02-06 RX ORDER — MECLIZINE HCL 12.5 MG
25 TABLET ORAL ONCE
Refills: 0 | Status: COMPLETED | OUTPATIENT
Start: 2022-02-06 | End: 2022-02-06

## 2022-02-06 RX ADMIN — Medication 25 MILLIGRAM(S): at 20:34

## 2022-02-06 NOTE — ED PROVIDER NOTE - NSFOLLOWUPINSTRUCTIONS_ED_ALL_ED_FT
-- Follow up with neurology referral if your symptoms are not improved in the next 2-3 days.    -- Return to the ER for worsening or persistent symptoms, and/or ANY NEW OR CONCERNING SYMPTOMS.    -- If you have difficulty following up, please call: [phone] to obtain a [facility] doctor or specialist who takes your insurance in your area.

## 2022-02-06 NOTE — ED ADULT TRIAGE NOTE - CHIEF COMPLAINT QUOTE
pt biba from [facility] her aide stated that she was having chest pain this evening  pt states she is having nausea and dizziness

## 2022-02-06 NOTE — ED PROVIDER NOTE - CLINICAL SUMMARY MEDICAL DECISION MAKING FREE TEXT BOX
89 y/o F with PMH of HTN, HLD, DM was BIB EMS from the [facility] assisted living CP and SOB, however EMS states when they arrived pts only complaint was dizziness and nausea. Pt currently denies CP, SOB, v/d, abd pain, HA or f/c.  She c/o dizziness described as spinning sensation, worse with opening her eyes and improved with sitting up. PE as noted above. labs/UA/CT pending, trial of meclizine, reassess 89 y/o F with PMH of HTN, HLD, DM was BIB EMS from the [facility] assisted living CP and SOB, however EMS states when they arrived pts only complaint was dizziness and nausea. Pt currently denies CP, SOB, v/d, abd pain, HA or f/c.  She c/o dizziness described as spinning sensation, worse with opening her eyes and improved with sitting up. PE as noted above. labs/UA/CT pending, trial of meclizine, reassess    945pm: labs reviewed, trop 5.2.  2nd trop pending. head CT neg. Rpt BP is improved. pt reports she feels better with meclizine, her dizziness is almost completely resolved

## 2022-02-06 NOTE — ED ADULT NURSE NOTE - CHIEF COMPLAINT QUOTE
pt biba from [facility] her aide stated that she was having chest pain this evening  pt states she is having nausea and dizziness

## 2022-02-06 NOTE — ED PROVIDER NOTE - PATIENT PORTAL LINK FT
You can access the FollowMyHealth Patient Portal offered by [facility] by registering at the following website: [email or web address]. By joining [employer]’s FollowMyHealth portal, you will also be able to view your health information using other applications (apps) compatible with our system.

## 2022-02-06 NOTE — ED PROVIDER NOTE - OBJECTIVE STATEMENT
89 y/o F with PMH of HTN, HLD, DM was BIB EMS from the [facility] assisted living CP and SOB, however EMS states when they arrived pts only complaint was dizziness and nausea. Pt currently denies CP, SOB, v/d, abd pain, HA or f/c.  She c/o dizziness described as spinning sensation, worse with opening her eyes and improved with sitting up. 89 y/o F with PMH of HTN, HLD, DM was BIB EMS from the [facility] assisted living CP and SOB, however EMS states when they arrived pts only complaint was dizziness and nausea. Pt currently denies CP, SOB, v/d, abd pain, HA or f/c.  She c/o dizziness described as spinning sensation, worse with opening her eyes and improved with sitting up.    NB: pt never complained of cp/sob -- only complaint was dizziness which aligns with patients current symptomatology, pt states room is spinning when she opens her eyes and feels better when she is sitting up.

## 2022-02-06 NOTE — ED ADULT NURSE NOTE - OBJECTIVE STATEMENT
Pt arrived to ED via ambulance c/o dizziness. Pt denies any sob or CP, meclizine given, which pt verbalizes dizziness has subsided, both trop negative. Pt to be discharged back to facility. No distress at this item. Safety maintained.

## 2022-02-06 NOTE — ED PROVIDER NOTE - CARE PROVIDER_API CALL
Dilip Napier (MD)  Neurology  [address], [address]  [place], [place] [id number]  Phone: [phone]  Fax: [phone]  Follow Up Time:

## 2022-02-07 VITALS
DIASTOLIC BLOOD PRESSURE: 67 MMHG | TEMPERATURE: 98 F | RESPIRATION RATE: 20 BRPM | OXYGEN SATURATION: 98 % | HEART RATE: 70 BPM | SYSTOLIC BLOOD PRESSURE: 188 MMHG

## 2022-02-10 NOTE — CHART NOTE - NSCHARTNOTEFT_GEN_A_CORE
SW called Pt at home to discuss and assist with follow-up care. Pt presenting to  ED on 2/6 with complaints of dizziness. Pt lives at [facility]. SW called [facility] wellness department to discuss follow-up care. Wellness department did not answer and no voicemail could be left.

## 2022-09-14 ENCOUNTER — EMERGENCY (EMERGENCY)
Facility: HOSPITAL | Age: 87
LOS: 1 days | Discharge: HOME | End: 2022-09-14
Attending: EMERGENCY MEDICINE | Admitting: EMERGENCY MEDICINE
Payer: MEDICARE

## 2022-09-14 VITALS
RESPIRATION RATE: 18 BRPM | WEIGHT: 126.99 LBS | SYSTOLIC BLOOD PRESSURE: 175 MMHG | HEART RATE: 76 BPM | TEMPERATURE: 99 F | HEIGHT: 61 IN | OXYGEN SATURATION: 98 % | DIASTOLIC BLOOD PRESSURE: 65 MMHG

## 2022-09-14 VITALS
SYSTOLIC BLOOD PRESSURE: 147 MMHG | OXYGEN SATURATION: 98 % | HEART RATE: 76 BPM | DIASTOLIC BLOOD PRESSURE: 75 MMHG | RESPIRATION RATE: 18 BRPM

## 2022-09-14 PROBLEM — I10 ESSENTIAL (PRIMARY) HYPERTENSION: Chronic | Status: ACTIVE | Noted: 2022-02-06

## 2022-09-14 PROBLEM — E78.5 HYPERLIPIDEMIA, UNSPECIFIED: Chronic | Status: ACTIVE | Noted: 2022-02-06

## 2022-09-14 PROBLEM — E11.9 TYPE 2 DIABETES MELLITUS WITHOUT COMPLICATIONS: Chronic | Status: ACTIVE | Noted: 2022-02-06

## 2022-09-14 LAB
ALBUMIN SERPL ELPH-MCNC: 3.2 G/DL — LOW (ref 3.3–5)
ALP SERPL-CCNC: 114 U/L — SIGNIFICANT CHANGE UP (ref 40–120)
ALT FLD-CCNC: 15 U/L — SIGNIFICANT CHANGE UP (ref 10–45)
ANION GAP SERPL CALC-SCNC: 8 MMOL/L — SIGNIFICANT CHANGE UP (ref 5–17)
APPEARANCE UR: ABNORMAL
AST SERPL-CCNC: 30 U/L — SIGNIFICANT CHANGE UP (ref 10–40)
BACTERIA # UR AUTO: ABNORMAL /HPF
BASOPHILS # BLD AUTO: 0.02 K/UL — SIGNIFICANT CHANGE UP (ref 0–0.2)
BASOPHILS NFR BLD AUTO: 0.2 % — SIGNIFICANT CHANGE UP (ref 0–2)
BILIRUB SERPL-MCNC: 2 MG/DL — HIGH (ref 0.2–1.2)
BILIRUB UR-MCNC: NEGATIVE — SIGNIFICANT CHANGE UP
BUN SERPL-MCNC: 31 MG/DL — HIGH (ref 7–23)
CALCIUM SERPL-MCNC: 9.3 MG/DL — SIGNIFICANT CHANGE UP (ref 8.4–10.5)
CHLORIDE SERPL-SCNC: 100 MMOL/L — SIGNIFICANT CHANGE UP (ref 96–108)
CO2 SERPL-SCNC: 27 MMOL/L — SIGNIFICANT CHANGE UP (ref 22–31)
COLOR SPEC: YELLOW — SIGNIFICANT CHANGE UP
CREAT SERPL-MCNC: 1.5 MG/DL — HIGH (ref 0.5–1.3)
DIFF PNL FLD: ABNORMAL
EGFR: 33 ML/MIN/1.73M2 — LOW
EOSINOPHIL # BLD AUTO: 0 K/UL — SIGNIFICANT CHANGE UP (ref 0–0.5)
EOSINOPHIL NFR BLD AUTO: 0 % — SIGNIFICANT CHANGE UP (ref 0–6)
EPI CELLS # UR: SIGNIFICANT CHANGE UP
GLUCOSE SERPL-MCNC: 149 MG/DL — HIGH (ref 70–99)
GLUCOSE UR QL: NEGATIVE — SIGNIFICANT CHANGE UP
HCT VFR BLD CALC: 34.1 % — LOW (ref 34.5–45)
HGB BLD-MCNC: 10.8 G/DL — LOW (ref 11.5–15.5)
IMM GRANULOCYTES NFR BLD AUTO: 0.7 % — SIGNIFICANT CHANGE UP (ref 0–1.5)
KETONES UR-MCNC: NEGATIVE — SIGNIFICANT CHANGE UP
LEUKOCYTE ESTERASE UR-ACNC: ABNORMAL
LYMPHOCYTES # BLD AUTO: 0.45 K/UL — LOW (ref 1–3.3)
LYMPHOCYTES # BLD AUTO: 3.7 % — LOW (ref 13–44)
MCHC RBC-ENTMCNC: 28.5 PG — SIGNIFICANT CHANGE UP (ref 27–34)
MCHC RBC-ENTMCNC: 31.7 GM/DL — LOW (ref 32–36)
MCV RBC AUTO: 90 FL — SIGNIFICANT CHANGE UP (ref 80–100)
MONOCYTES # BLD AUTO: 0.89 K/UL — SIGNIFICANT CHANGE UP (ref 0–0.9)
MONOCYTES NFR BLD AUTO: 7.2 % — SIGNIFICANT CHANGE UP (ref 2–14)
NEUTROPHILS # BLD AUTO: 10.85 K/UL — HIGH (ref 1.8–7.4)
NEUTROPHILS NFR BLD AUTO: 88.2 % — HIGH (ref 43–77)
NITRITE UR-MCNC: POSITIVE
NRBC # BLD: 0 /100 WBCS — SIGNIFICANT CHANGE UP (ref 0–0)
NT-PROBNP SERPL-SCNC: 4431 PG/ML — HIGH (ref 0–300)
PH UR: 6 — SIGNIFICANT CHANGE UP (ref 5–8)
PLATELET # BLD AUTO: 125 K/UL — LOW (ref 150–400)
POTASSIUM SERPL-MCNC: 4.4 MMOL/L — SIGNIFICANT CHANGE UP (ref 3.5–5.3)
POTASSIUM SERPL-SCNC: 4.4 MMOL/L — SIGNIFICANT CHANGE UP (ref 3.5–5.3)
PROT SERPL-MCNC: 7.5 G/DL — SIGNIFICANT CHANGE UP (ref 6–8.3)
PROT UR-MCNC: 100
RAPID RVP RESULT: SIGNIFICANT CHANGE UP
RBC # BLD: 3.79 M/UL — LOW (ref 3.8–5.2)
RBC # FLD: 14.5 % — SIGNIFICANT CHANGE UP (ref 10.3–14.5)
RBC CASTS # UR COMP ASSIST: ABNORMAL /HPF (ref 0–4)
SARS-COV-2 RNA SPEC QL NAA+PROBE: SIGNIFICANT CHANGE UP
SODIUM SERPL-SCNC: 135 MMOL/L — SIGNIFICANT CHANGE UP (ref 135–145)
SP GR SPEC: 1.02 — SIGNIFICANT CHANGE UP (ref 1.01–1.02)
TROPONIN I, HIGH SENSITIVITY RESULT: 11.1 NG/L — SIGNIFICANT CHANGE UP
TROPONIN I, HIGH SENSITIVITY RESULT: 15 NG/L — SIGNIFICANT CHANGE UP
UROBILINOGEN FLD QL: NEGATIVE — SIGNIFICANT CHANGE UP
WBC # BLD: 12.3 K/UL — HIGH (ref 3.8–10.5)
WBC # FLD AUTO: 12.3 K/UL — HIGH (ref 3.8–10.5)
WBC UR QL: ABNORMAL /HPF (ref 0–5)

## 2022-09-14 PROCEDURE — 93010 ELECTROCARDIOGRAM REPORT: CPT

## 2022-09-14 PROCEDURE — 87086 URINE CULTURE/COLONY COUNT: CPT

## 2022-09-14 PROCEDURE — 0225U NFCT DS DNA&RNA 21 SARSCOV2: CPT

## 2022-09-14 PROCEDURE — 87186 SC STD MICRODIL/AGAR DIL: CPT

## 2022-09-14 PROCEDURE — 71045 X-RAY EXAM CHEST 1 VIEW: CPT

## 2022-09-14 PROCEDURE — 85025 COMPLETE CBC W/AUTO DIFF WBC: CPT

## 2022-09-14 PROCEDURE — 99285 EMERGENCY DEPT VISIT HI MDM: CPT | Mod: 25

## 2022-09-14 PROCEDURE — 81001 URINALYSIS AUTO W/SCOPE: CPT

## 2022-09-14 PROCEDURE — 36415 COLL VENOUS BLD VENIPUNCTURE: CPT

## 2022-09-14 PROCEDURE — 71045 X-RAY EXAM CHEST 1 VIEW: CPT | Mod: 26

## 2022-09-14 PROCEDURE — 83880 ASSAY OF NATRIURETIC PEPTIDE: CPT

## 2022-09-14 PROCEDURE — 84484 ASSAY OF TROPONIN QUANT: CPT

## 2022-09-14 PROCEDURE — 80053 COMPREHEN METABOLIC PANEL: CPT

## 2022-09-14 PROCEDURE — 93005 ELECTROCARDIOGRAM TRACING: CPT

## 2022-09-14 PROCEDURE — 99284 EMERGENCY DEPT VISIT MOD MDM: CPT | Mod: FS

## 2022-09-14 RX ORDER — CEPHALEXIN 500 MG
500 CAPSULE ORAL ONCE
Refills: 0 | Status: COMPLETED | OUTPATIENT
Start: 2022-09-14 | End: 2022-09-14

## 2022-09-14 RX ORDER — CEFTRIAXONE 500 MG/1
1000 INJECTION, POWDER, FOR SOLUTION INTRAMUSCULAR; INTRAVENOUS ONCE
Refills: 0 | Status: DISCONTINUED | OUTPATIENT
Start: 2022-09-14 | End: 2022-09-14

## 2022-09-14 RX ORDER — CEPHALEXIN 500 MG
1 CAPSULE ORAL
Qty: 20 | Refills: 0
Start: 2022-09-14 | End: 2022-09-23

## 2022-09-14 RX ORDER — SODIUM CHLORIDE 9 MG/ML
500 INJECTION INTRAMUSCULAR; INTRAVENOUS; SUBCUTANEOUS ONCE
Refills: 0 | Status: COMPLETED | OUTPATIENT
Start: 2022-09-14 | End: 2022-09-14

## 2022-09-14 RX ADMIN — SODIUM CHLORIDE 1000 MILLILITER(S): 9 INJECTION INTRAMUSCULAR; INTRAVENOUS; SUBCUTANEOUS at 13:58

## 2022-09-14 RX ADMIN — Medication 500 MILLIGRAM(S): at 17:13

## 2022-09-14 NOTE — ED PROVIDER NOTE - PATIENT PORTAL LINK FT
You can access the FollowMyHealth Patient Portal offered by [facility] by registering at the following website: [email or web address]. By joining [employer]’s FollowMyHealth portal, you will also be able to view your health information using other applications (apps) compatible with our system.

## 2022-09-14 NOTE — ED PROVIDER NOTE - ATTENDING APP SHARED VISIT CONTRIBUTION OF CARE
Agatha with DAMARIS Marsh. 89 yo female, hx CAD, diabetes, htn, high cholesterol, comes to the ED co fatigue. As per patient  just feeling more tired/ fatigue than usual since yesterday/ .  Denies any weakness, numbness, tingling, any headaches, dizziness , chest pain, sob, abd pain, diarrhea .  urinary symptoms, gait difficulty,  or any other complaints.   exam wnl,  labs, fluids, ua, cxr, re-eval    I performed a face to face bedside interview with patient regarding history of present illness, review of symptoms and past medical history. I completed an independent physical exam.  I have discussed the patient's plan of care with Physician Assistant (PA). I agree with note as stated above, having amended the EMR as needed to reflect my findings.   This includes History of Present Illness, HIV, Past Medical/Surgical/Family/Social History, Allergies and Home Medications, Review of Systems, Physical Exam, and any Progress Notes during the time I functioned as the attending physician for this patient.

## 2022-09-14 NOTE — ED PROVIDER NOTE - CLINICAL SUMMARY MEDICAL DECISION MAKING FREE TEXT BOX
89 yo female, hx CAD, diabetes, htn, high cholesterol, comes to the ED co fatigue. As per patient  just feeling more tired/ fatigue than usual since yesterday/ .  Denies any weakness, numbness, tingling, any headaches, dizziness , chest pain, sob, abd pain, diarrhea .  urinary symptoms, gait difficulty,  or any other complaints.   exam wnl,  labs, fluids, ua, cxr, re-eval 89 yo female, hx CAD, diabetes, htn, high cholesterol, comes to the ED co fatigue. As per patient  just feeling more tired/ fatigue than usual since yesterday/ .  Denies any weakness, numbness, tingling, any headaches, dizziness , chest pain, sob, abd pain, diarrhea .  urinary symptoms, gait difficulty,  or any other complaints.   exam wnl,  labs, fluids, ua, cxr, re-eval  +UTI. Abx given. Pt well appearing. Safe for d/c. Will send with Abx.

## 2022-09-14 NOTE — ED ADULT NURSE NOTE - CAS EDP DISCH TYPE
The [facility] living [facility]

## 2022-09-14 NOTE — ED PROVIDER NOTE - NS ED ATTENDING STATEMENT MOD
This was a shared visit with the CARIN. I reviewed and verified the documentation and independently performed the documented:
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
## 2022-09-14 NOTE — ED ADULT TRIAGE NOTE - CHIEF COMPLAINT QUOTE
PT BIBA from [facility] for feeling malaise and "not herself" today. Pt also having decreased appetite.

## 2022-09-14 NOTE — ED ADULT NURSE NOTE - OBJECTIVE STATEMENT
Assumed pt care for a 89 yr old female complaining of weakness. Pt reports she wasn't able to do her normal ADL such as placing her dentures. Pt denies any chest pain, dyspnea, neurological symptoms. Awaiting further disposition.

## 2022-09-14 NOTE — ED PROVIDER NOTE - OBJECTIVE STATEMENT
89 yo female, hx CAD, diabetes, htn, high cholesterol, comes to the ED co fatigue. As per patient  just feeling more tired/ fatigue than usual since yesterday/ .  Denies any weakness, numbness, tingling, any headaches, dizziness , chest pain, sob, abd pain, diarrhea .  urinary symptoms, gait difficulty,  or any other complaints.

## 2022-09-14 NOTE — ED ADULT NURSE NOTE - NSICDXPASTMEDICALHX_GEN_ALL_CORE_FT
PAST MEDICAL HISTORY:  Carotid Artery Disease     Coronary Arteriosclerosis     Depression     DM (diabetes mellitus)     DM II     H/O: 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     HLD (hyperlipidemia)     HTN (hypertension)     Hypercholesteremia     Hypertension     Hypothyroid     Immunization, BCG     left Rotator Cuff tear 1996

## 2022-10-11 NOTE — ED PROVIDER NOTE - NPI NUMBER (FOR SYSADMIN USE ONLY) :
[Never] : Never [No] : In the past 12 months have you used drugs other than those required for medical reasons? No [0] : 1) Little interest or pleasure doing things: Not at all (0) [1] : 2) Feeling down, depressed, or hopeless for several days (1) [PHQ-2 Negative - No further assessment needed] : PHQ-2 Negative - No further assessment needed [de-identified] : NOne  [de-identified] : Urologist in [facility]  [[id number]]

## 2022-10-13 ENCOUNTER — INPATIENT (INPATIENT)
Facility: HOSPITAL | Age: 87
LOS: 8 days | Discharge: NURSING, REHAB OR LONG-TERM CARE | DRG: 291 | End: 2022-10-22
Attending: HOSPITALIST | Admitting: STUDENT IN AN ORGANIZED HEALTH CARE EDUCATION/TRAINING PROGRAM
Payer: MEDICARE

## 2022-10-13 VITALS
HEART RATE: 67 BPM | OXYGEN SATURATION: 92 % | DIASTOLIC BLOOD PRESSURE: 52 MMHG | HEIGHT: 61 IN | RESPIRATION RATE: 18 BRPM | WEIGHT: 130.07 LBS | TEMPERATURE: 98 F | SYSTOLIC BLOOD PRESSURE: 119 MMHG

## 2022-10-13 DIAGNOSIS — I50.9 HEART FAILURE, UNSPECIFIED: ICD-10-CM

## 2022-10-13 LAB
ALBUMIN SERPL ELPH-MCNC: 2.6 G/DL — LOW (ref 3.3–5)
ALP SERPL-CCNC: 94 U/L — SIGNIFICANT CHANGE UP (ref 40–120)
ALT FLD-CCNC: 17 U/L — SIGNIFICANT CHANGE UP (ref 10–45)
ANION GAP SERPL CALC-SCNC: 4 MMOL/L — LOW (ref 5–17)
AST SERPL-CCNC: 35 U/L — SIGNIFICANT CHANGE UP (ref 10–40)
BASE EXCESS BLDA CALC-SCNC: 2.9 MMOL/L — SIGNIFICANT CHANGE UP (ref -2–3)
BASOPHILS # BLD AUTO: 0.01 K/UL — SIGNIFICANT CHANGE UP (ref 0–0.2)
BASOPHILS NFR BLD AUTO: 0.1 % — SIGNIFICANT CHANGE UP (ref 0–2)
BILIRUB SERPL-MCNC: 1.3 MG/DL — HIGH (ref 0.2–1.2)
BUN SERPL-MCNC: 31 MG/DL — HIGH (ref 7–23)
CALCIUM SERPL-MCNC: 8.9 MG/DL — SIGNIFICANT CHANGE UP (ref 8.4–10.5)
CHLORIDE SERPL-SCNC: 103 MMOL/L — SIGNIFICANT CHANGE UP (ref 96–108)
CO2 BLDA-SCNC: 28 MMOL/L — HIGH (ref 19–24)
CO2 SERPL-SCNC: 29 MMOL/L — SIGNIFICANT CHANGE UP (ref 22–31)
CREAT SERPL-MCNC: 1.47 MG/DL — HIGH (ref 0.5–1.3)
EGFR: 33 ML/MIN/1.73M2 — LOW
EOSINOPHIL # BLD AUTO: 0.02 K/UL — SIGNIFICANT CHANGE UP (ref 0–0.5)
EOSINOPHIL NFR BLD AUTO: 0.2 % — SIGNIFICANT CHANGE UP (ref 0–6)
GAS PNL BLDA: SIGNIFICANT CHANGE UP
GLUCOSE BLDC GLUCOMTR-MCNC: 220 MG/DL — HIGH (ref 70–99)
GLUCOSE SERPL-MCNC: 174 MG/DL — HIGH (ref 70–99)
HCO3 BLDA-SCNC: 27 MMOL/L — SIGNIFICANT CHANGE UP (ref 21–28)
HCT VFR BLD CALC: 25.4 % — LOW (ref 34.5–45)
HGB BLD-MCNC: 8.1 G/DL — LOW (ref 11.5–15.5)
HOROWITZ INDEX BLDA+IHG-RTO: 28 — SIGNIFICANT CHANGE UP
IMM GRANULOCYTES NFR BLD AUTO: 0.3 % — SIGNIFICANT CHANGE UP (ref 0–0.9)
LYMPHOCYTES # BLD AUTO: 0.34 K/UL — LOW (ref 1–3.3)
LYMPHOCYTES # BLD AUTO: 4 % — LOW (ref 13–44)
MCHC RBC-ENTMCNC: 28.5 PG — SIGNIFICANT CHANGE UP (ref 27–34)
MCHC RBC-ENTMCNC: 31.9 GM/DL — LOW (ref 32–36)
MCV RBC AUTO: 89.4 FL — SIGNIFICANT CHANGE UP (ref 80–100)
MONOCYTES # BLD AUTO: 1.17 K/UL — HIGH (ref 0–0.9)
MONOCYTES NFR BLD AUTO: 13.6 % — SIGNIFICANT CHANGE UP (ref 2–14)
NEUTROPHILS # BLD AUTO: 7.03 K/UL — SIGNIFICANT CHANGE UP (ref 1.8–7.4)
NEUTROPHILS NFR BLD AUTO: 81.8 % — HIGH (ref 43–77)
NRBC # BLD: 0 /100 WBCS — SIGNIFICANT CHANGE UP (ref 0–0)
NT-PROBNP SERPL-SCNC: 8680 PG/ML — HIGH (ref 0–300)
PCO2 BLDA: 41 MMHG — HIGH (ref 32–35)
PH BLDA: 7.43 — SIGNIFICANT CHANGE UP (ref 7.35–7.45)
PLATELET # BLD AUTO: 127 K/UL — LOW (ref 150–400)
PO2 BLDA: 102 MMHG — SIGNIFICANT CHANGE UP (ref 83–108)
POTASSIUM SERPL-MCNC: 4.7 MMOL/L — SIGNIFICANT CHANGE UP (ref 3.5–5.3)
POTASSIUM SERPL-SCNC: 4.7 MMOL/L — SIGNIFICANT CHANGE UP (ref 3.5–5.3)
PROT SERPL-MCNC: 6.7 G/DL — SIGNIFICANT CHANGE UP (ref 6–8.3)
RBC # BLD: 2.84 M/UL — LOW (ref 3.8–5.2)
RBC # FLD: 16.1 % — HIGH (ref 10.3–14.5)
SAO2 % BLDA: 98.6 % — HIGH (ref 94–98)
SARS-COV-2 RNA SPEC QL NAA+PROBE: SIGNIFICANT CHANGE UP
SODIUM SERPL-SCNC: 136 MMOL/L — SIGNIFICANT CHANGE UP (ref 135–145)
TROPONIN I, HIGH SENSITIVITY RESULT: 18.7 NG/L — SIGNIFICANT CHANGE UP
TROPONIN I, HIGH SENSITIVITY RESULT: 20.4 NG/L — SIGNIFICANT CHANGE UP
WBC # BLD: 8.6 K/UL — SIGNIFICANT CHANGE UP (ref 3.8–10.5)
WBC # FLD AUTO: 8.6 K/UL — SIGNIFICANT CHANGE UP (ref 3.8–10.5)

## 2022-10-13 PROCEDURE — 99285 EMERGENCY DEPT VISIT HI MDM: CPT | Mod: FS

## 2022-10-13 PROCEDURE — 99222 1ST HOSP IP/OBS MODERATE 55: CPT

## 2022-10-13 PROCEDURE — 99223 1ST HOSP IP/OBS HIGH 75: CPT

## 2022-10-13 PROCEDURE — 71045 X-RAY EXAM CHEST 1 VIEW: CPT | Mod: 26

## 2022-10-13 PROCEDURE — 93970 EXTREMITY STUDY: CPT | Mod: 26

## 2022-10-13 PROCEDURE — 71250 CT THORAX DX C-: CPT | Mod: 26

## 2022-10-13 PROCEDURE — 93010 ELECTROCARDIOGRAM REPORT: CPT | Mod: 76

## 2022-10-13 RX ORDER — ONDANSETRON 8 MG/1
4 TABLET, FILM COATED ORAL EVERY 8 HOURS
Refills: 0 | Status: DISCONTINUED | OUTPATIENT
Start: 2022-10-13 | End: 2022-10-22

## 2022-10-13 RX ORDER — MIRTAZAPINE 45 MG/1
15 TABLET, ORALLY DISINTEGRATING ORAL AT BEDTIME
Refills: 0 | Status: DISCONTINUED | OUTPATIENT
Start: 2022-10-13 | End: 2022-10-22

## 2022-10-13 RX ORDER — PREGABALIN 225 MG/1
1000 CAPSULE ORAL DAILY
Refills: 0 | Status: DISCONTINUED | OUTPATIENT
Start: 2022-10-13 | End: 2022-10-22

## 2022-10-13 RX ORDER — IPRATROPIUM/ALBUTEROL SULFATE 18-103MCG
3 AEROSOL WITH ADAPTER (GRAM) INHALATION ONCE
Refills: 0 | Status: COMPLETED | OUTPATIENT
Start: 2022-10-13 | End: 2022-10-13

## 2022-10-13 RX ORDER — ACETAMINOPHEN 500 MG
650 TABLET ORAL EVERY 6 HOURS
Refills: 0 | Status: DISCONTINUED | OUTPATIENT
Start: 2022-10-13 | End: 2022-10-22

## 2022-10-13 RX ORDER — ATORVASTATIN CALCIUM 80 MG/1
80 TABLET, FILM COATED ORAL AT BEDTIME
Refills: 0 | Status: DISCONTINUED | OUTPATIENT
Start: 2022-10-13 | End: 2022-10-22

## 2022-10-13 RX ORDER — CLOPIDOGREL BISULFATE 75 MG/1
75 TABLET, FILM COATED ORAL DAILY
Refills: 0 | Status: DISCONTINUED | OUTPATIENT
Start: 2022-10-13 | End: 2022-10-22

## 2022-10-13 RX ORDER — LOSARTAN POTASSIUM 100 MG/1
1 TABLET, FILM COATED ORAL
Qty: 0 | Refills: 0 | DISCHARGE

## 2022-10-13 RX ORDER — LEVOTHYROXINE SODIUM 125 MCG
1 TABLET ORAL
Qty: 0 | Refills: 0 | DISCHARGE

## 2022-10-13 RX ORDER — ATORVASTATIN CALCIUM 80 MG/1
1 TABLET, FILM COATED ORAL
Qty: 0 | Refills: 0 | DISCHARGE

## 2022-10-13 RX ORDER — FUROSEMIDE 40 MG
40 TABLET ORAL DAILY
Refills: 0 | Status: DISCONTINUED | OUTPATIENT
Start: 2022-10-14 | End: 2022-10-16

## 2022-10-13 RX ORDER — IPRATROPIUM/ALBUTEROL SULFATE 18-103MCG
3 AEROSOL WITH ADAPTER (GRAM) INHALATION EVERY 6 HOURS
Refills: 0 | Status: DISCONTINUED | OUTPATIENT
Start: 2022-10-13 | End: 2022-10-22

## 2022-10-13 RX ORDER — METOPROLOL TARTRATE 50 MG
50 TABLET ORAL
Refills: 0 | Status: DISCONTINUED | OUTPATIENT
Start: 2022-10-13 | End: 2022-10-14

## 2022-10-13 RX ORDER — ASPIRIN/CALCIUM CARB/MAGNESIUM 324 MG
1 TABLET ORAL
Qty: 0 | Refills: 0 | DISCHARGE

## 2022-10-13 RX ORDER — FUROSEMIDE 40 MG
40 TABLET ORAL ONCE
Refills: 0 | Status: COMPLETED | OUTPATIENT
Start: 2022-10-13 | End: 2022-10-13

## 2022-10-13 RX ORDER — LOSARTAN POTASSIUM 100 MG/1
100 TABLET, FILM COATED ORAL DAILY
Refills: 0 | Status: DISCONTINUED | OUTPATIENT
Start: 2022-10-13 | End: 2022-10-19

## 2022-10-13 RX ORDER — PANTOPRAZOLE SODIUM 20 MG/1
40 TABLET, DELAYED RELEASE ORAL
Refills: 0 | Status: DISCONTINUED | OUTPATIENT
Start: 2022-10-14 | End: 2022-10-22

## 2022-10-13 RX ORDER — CHOLECALCIFEROL (VITAMIN D3) 125 MCG
1000 CAPSULE ORAL DAILY
Refills: 0 | Status: DISCONTINUED | OUTPATIENT
Start: 2022-10-13 | End: 2022-10-22

## 2022-10-13 RX ORDER — DEXAMETHASONE 0.5 MG/5ML
10 ELIXIR ORAL ONCE
Refills: 0 | Status: COMPLETED | OUTPATIENT
Start: 2022-10-13 | End: 2022-10-13

## 2022-10-13 RX ORDER — HEPARIN SODIUM 5000 [USP'U]/ML
5000 INJECTION INTRAVENOUS; SUBCUTANEOUS EVERY 8 HOURS
Refills: 0 | Status: DISCONTINUED | OUTPATIENT
Start: 2022-10-13 | End: 2022-10-22

## 2022-10-13 RX ORDER — METOPROLOL TARTRATE 50 MG
1 TABLET ORAL
Qty: 0 | Refills: 0 | DISCHARGE

## 2022-10-13 RX ORDER — LANOLIN ALCOHOL/MO/W.PET/CERES
3 CREAM (GRAM) TOPICAL AT BEDTIME
Refills: 0 | Status: DISCONTINUED | OUTPATIENT
Start: 2022-10-13 | End: 2022-10-22

## 2022-10-13 RX ORDER — LEVOTHYROXINE SODIUM 125 MCG
125 TABLET ORAL DAILY
Refills: 0 | Status: DISCONTINUED | OUTPATIENT
Start: 2022-10-14 | End: 2022-10-22

## 2022-10-13 RX ADMIN — Medication 40 MILLIGRAM(S): at 13:46

## 2022-10-13 RX ADMIN — HEPARIN SODIUM 5000 UNIT(S): 5000 INJECTION INTRAVENOUS; SUBCUTANEOUS at 22:31

## 2022-10-13 RX ADMIN — Medication 50 MILLIGRAM(S): at 19:20

## 2022-10-13 RX ADMIN — MIRTAZAPINE 15 MILLIGRAM(S): 45 TABLET, ORALLY DISINTEGRATING ORAL at 22:32

## 2022-10-13 RX ADMIN — Medication 10 MILLIGRAM(S): at 14:17

## 2022-10-13 RX ADMIN — Medication 102 MILLIGRAM(S): at 13:47

## 2022-10-13 RX ADMIN — Medication 3 MILLILITER(S): at 17:04

## 2022-10-13 RX ADMIN — ATORVASTATIN CALCIUM 80 MILLIGRAM(S): 80 TABLET, FILM COATED ORAL at 22:32

## 2022-10-13 RX ADMIN — Medication 3 MILLILITER(S): at 13:46

## 2022-10-13 NOTE — ED PROVIDER NOTE - OBJECTIVE STATEMENT
89 y/o female with pmh of HTN, DM2, HLD, hypothyroid BIBEMS from [facility] for shortness of breath that began this AM, patient also reports cough. Denies fever, chills, chest pain, palpitations. 89 y/o female with pmh of HTN, DM2, HLD, hypothyroid BIBEMS from [facility] for shortness of breath that began this AM, patient also reports cough and increased B/L LE edema. Denies fever, chills, chest pain, palpitations. Came in tachypneic on 2 L NC. Not on home O2 normally.  (+) daily smoker x 50 years

## 2022-10-13 NOTE — ED PROVIDER NOTE - ATTENDING APP SHARED VISIT CONTRIBUTION OF CARE
89 y/o female with pmh of HTN, DM2, HLD, hypothyroid BIBEMS from [facility] for shortness of breath that began this AM, patient also reports cough. Denies fever, chills, chest pain, palpitations.  (+) daily smoker x 50 years  DDX to consider but not limited to: COPD exacerbation vs. CHF exacerbation vs. ACS  Plan: CBC, CMP, COVID PCR, pro-BNP, troponin, CXR, ECG- give steroids, nebs, Lasix and ADMIT  Dr. Lobo:  I have reviewed and discussed with the PA/ resident the case specifics, including the history, physical assessment, evaluation, conclusion, laboratory results, and medical plan. I agree with the contents, and conclusions. I have personally examined, and interviewed the patient.

## 2022-10-13 NOTE — H&P ADULT - NSHPSOCIALHISTORY_GEN_ALL_CORE
Lives at [facility]  Ambulates with walker  Former smoker, quit 20 years ago but has 50 PPD history  Denies EtOH, illicit drug use

## 2022-10-13 NOTE — ED ADULT TRIAGE NOTE - CHIEF COMPLAINT QUOTE
Patient BIB EMS from [facility] for possible CHF exacerbation. Patient with BLE edema and intermittent SOB.

## 2022-10-13 NOTE — ED PROVIDER NOTE - NSICDXFAMILYHX_GEN_ALL_CORE_FT
Teaching Attestation:  I have personally interviewed and examined the patient via face-to-face. I confirmed the findings listed below:    • Encounter for well child examination without abnormal findings  (primary encounter diagnosis)  • Screening, iron deficiency anemia  • Screening for lead exposure     This was discussed with the resident and I agree with the assessment and plan as documented. The plan of care was discussed with the family.    Nataly Whitaker MD  Internal Medicine - Pediatrics         FAMILY HISTORY:  No pertinent family history in first degree relatives

## 2022-10-13 NOTE — ED ADULT NURSE NOTE - CHIEF COMPLAINT QUOTE
Patient BIB EMS from [facility] for possible CHF exacerbation. Patient with BLE edema and intermittent SOB.

## 2022-10-13 NOTE — H&P ADULT - NSHPREVIEWOFSYSTEMS_GEN_ALL_CORE
CONSTITUTIONAL: denies fever, chills, admits to fatigue, weakness  HEENT: denies blurred vision, sore throat  CARDIOVASCULAR: denies chest pain, chest pressure, palpitations  RESPIRATORY: admits to shortness of breath, admits to cough, denies sputum production  GASTROINTESTINAL: denies nausea, vomiting, diarrhea, abdominal pain  GENITOURINARY: denies dysuria, discharge  NEUROLOGICAL: denies numbness, headache  MUSCULOSKELETAL: denies new joint pain, muscle aches  HEMATOLOGIC: denies gross bleeding, bruising  LYMPHATICS: denies enlarged lymph nodes, admits to extremity swelling  PSYCHIATRIC: denies recent changes in anxiety, depression  ENDOCRINOLOGIC: denies sweating, cold or heat intolerance

## 2022-10-13 NOTE — ED ADULT NURSE REASSESSMENT NOTE - NS ED NURSE REASSESS COMMENT FT1
Cardiac monitor alarming, Ventricular arrythmia with episodes of increased rate, Dr. Carrillo informed Repeat EKG obtained, pt without complaints, sitting up eating. Other VSS.

## 2022-10-13 NOTE — CONSULT NOTE ADULT - ASSESSMENT
In summary the patient is a 89 yr old woman with cad and probable chf. Her anemia is not insignificant and should be investigated given her hx of cad and now chf    suggest    echo  would switch metoprolol to metoprolol succinate  iv lasix for edema

## 2022-10-13 NOTE — H&P ADULT - NSICDXPASTMEDICALHX_GEN_ALL_CORE_FT
1 person assist PAST MEDICAL HISTORY:  Carotid Artery Disease     Coronary Arteriosclerosis     Depression     DM (diabetes mellitus)     DM II     H/O: 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     HLD (hyperlipidemia)     HTN (hypertension)     Hypercholesteremia     Hypertension     Hypothyroid     Immunization, BCG     left Rotator Cuff tear 1996

## 2022-10-13 NOTE — ED ADULT NURSE NOTE - OBJECTIVE STATEMENT
pt BIBEMS from [facility] for shortness of breath and tachycardia that began this AM, patient also reports cough and having trouble walking. Denies fever, chills, chest pain, palpitations. pt with o2 sat 92% on RA, placed on 2LNC on arrival. pt with PMh of HTN, DM2, HLD, hypothyroid. pt with B/L pitting edema to lower extremities on arrival. Sent in by MD ellsworth to r/o CHF and afib. pt with HR 67 on arrival.

## 2022-10-13 NOTE — ED ADULT NURSE NOTE - NSIMPLEMENTINTERV_GEN_ALL_ED
Implemented All Fall with Harm Risk Interventions:  [place] to call system. Call bell, personal items and telephone within reach. Instruct patient to call for assistance. Room bathroom lighting operational. Non-slip footwear when patient is off stretcher. Physically safe environment: no spills, clutter or unnecessary equipment. Stretcher in lowest position, wheels locked, appropriate side rails in place. Provide visual cue, wrist band, yellow gown, etc. Monitor gait and stability. Monitor for mental status changes and reorient to person, place, and time. Review medications for side effects contributing to fall risk. Reinforce activity limits and safety measures with patient and family. Provide visual clues: red socks.

## 2022-10-13 NOTE — H&P ADULT - NSHPPHYSICALEXAM_GEN_ALL_CORE
T(C): 36.7 (10-13-22 @ 11:23), Max: 36.7 (10-13-22 @ 11:23)  HR: 68 (10-13-22 @ 13:45) (63 - 68)  BP: 140/61 (10-13-22 @ 13:45) (119/52 - 140/61)  RR: 24 (10-13-22 @ 13:45) (18 - 24)  SpO2: 100% (10-13-22 @ 13:45) (92% - 100%)    GENERAL: NAD, on 2L NC, pleasant  EYES: sclera clear, no exudates  ENMT: oropharynx clear without erythema, no exudates, moist mucous membranes  NECK: supple, soft, no thyromegaly noted  LUNGS: good air entry bilaterally, mild wheezing throughout, mild bibasilar rales  HEART: soft S1/S2, regular rate and rhythm, no murmurs noted, 2+ pitting lower extremity edema  GASTROINTESTINAL: abdomen is soft, nontender, nondistended, normoactive bowel sounds, no palpable masses  INTEGUMENT: warm and perfused  MUSCULOSKELETAL: no clubbing or cyanosis, no obvious deformity  PSYCHIATRIC: mood is good, affect is congruent, linear and logical thought process

## 2022-10-13 NOTE — ED PROVIDER NOTE - CLINICAL SUMMARY MEDICAL DECISION MAKING FREE TEXT BOX
89 y/o female with pmh of HTN, DM2, HLD, hypothyroid BIBEMS from [facility] for shortness of breath that began this AM, patient also reports cough. Denies fever, chills, chest pain, palpitations.    Plan: CBC, CMP, COVID PCR, pro-BNP, troponin, CXR, ECG 89 y/o female with pmh of HTN, DM2, HLD, hypothyroid BIBEMS from [facility] for shortness of breath that began this AM, patient also reports cough. Denies fever, chills, chest pain, palpitations.  (+) daily smoker x 50 years  DDX to consider but not limited to: COPD exacerbation vs. CHF exacerbation vs. ACS  Plan: CBC, CMP, COVID PCR, pro-BNP, troponin, CXR, ECG- give steroids, nebs, Lasix and ADMIT

## 2022-10-13 NOTE — H&P ADULT - HISTORY OF PRESENT ILLNESS
89 year old F PMH CAD s/p CABG x3 (5/2010), DM type 2, HTN, HLD presenting to the ED from [facility] for SOB and bilateral LE edema. Patient is good historian and states that for the last few days has been having cough, that has gotten worse but is nonproductive. SOB worse this morning and was brought to the ED for further evaluation. Patient currently on 2L NC but not on home O2. Patient also states that her lower extremity swelling has been worse but denies pain. Denies chest pain or SOB at rest right now, or with exertion at NH, uses walker at baseline. Patient states she feels a little better now, but not at baseline.    In the ED, T 98.1F, HR 67, /52, RR 18, SpO2 92% on RA. Labs showed H/H 8.1/25.4, BUN/Cr 31/1.47, proBNP 8680, trop negative x2. Received decadron 10mg IVPB x1, lasix 40mg IV x1, duoneb x1 in the ED.    EKG: sinus with PACs HR 66, RBBB (old)  CXR: no acute pathology seen FOLLOW UP OFFICIAL READ

## 2022-10-13 NOTE — PATIENT PROFILE ADULT - FALL HARM RISK - HARM RISK INTERVENTIONS

## 2022-10-13 NOTE — ED ADULT NURSE REASSESSMENT NOTE - NS ED NURSE REASSESS COMMENT FT1
MD gaines at bedside for admission to tele at this time. RT also at bedside for ABG as per MD orders.

## 2022-10-14 LAB
ALBUMIN SERPL ELPH-MCNC: 2.7 G/DL — LOW (ref 3.3–5)
ALP SERPL-CCNC: 104 U/L — SIGNIFICANT CHANGE UP (ref 40–120)
ALT FLD-CCNC: 21 U/L — SIGNIFICANT CHANGE UP (ref 10–45)
ANION GAP SERPL CALC-SCNC: 8 MMOL/L — SIGNIFICANT CHANGE UP (ref 5–17)
AST SERPL-CCNC: 28 U/L — SIGNIFICANT CHANGE UP (ref 10–40)
BASOPHILS # BLD AUTO: 0 K/UL — SIGNIFICANT CHANGE UP (ref 0–0.2)
BASOPHILS NFR BLD AUTO: 0 % — SIGNIFICANT CHANGE UP (ref 0–2)
BILIRUB SERPL-MCNC: 0.8 MG/DL — SIGNIFICANT CHANGE UP (ref 0.2–1.2)
BLD GP AB SCN SERPL QL: SIGNIFICANT CHANGE UP
BUN SERPL-MCNC: 38 MG/DL — HIGH (ref 7–23)
CALCIUM SERPL-MCNC: 9.2 MG/DL — SIGNIFICANT CHANGE UP (ref 8.4–10.5)
CHLORIDE SERPL-SCNC: 103 MMOL/L — SIGNIFICANT CHANGE UP (ref 96–108)
CO2 SERPL-SCNC: 28 MMOL/L — SIGNIFICANT CHANGE UP (ref 22–31)
CREAT SERPL-MCNC: 1.51 MG/DL — HIGH (ref 0.5–1.3)
EGFR: 32 ML/MIN/1.73M2 — LOW
EOSINOPHIL # BLD AUTO: 0 K/UL — SIGNIFICANT CHANGE UP (ref 0–0.5)
EOSINOPHIL NFR BLD AUTO: 0 % — SIGNIFICANT CHANGE UP (ref 0–6)
FERRITIN SERPL-MCNC: 438 NG/ML — HIGH (ref 15–150)
FOLATE SERPL-MCNC: 10.1 NG/ML — SIGNIFICANT CHANGE UP
GLUCOSE SERPL-MCNC: 143 MG/DL — HIGH (ref 70–99)
HCT VFR BLD CALC: 26.6 % — LOW (ref 34.5–45)
HGB BLD-MCNC: 8.4 G/DL — LOW (ref 11.5–15.5)
IMM GRANULOCYTES NFR BLD AUTO: 0.6 % — SIGNIFICANT CHANGE UP (ref 0–0.9)
IRON SATN MFR SERPL: 12 % — LOW (ref 14–50)
IRON SATN MFR SERPL: 22 UG/DL — LOW (ref 30–160)
LYMPHOCYTES # BLD AUTO: 0.45 K/UL — LOW (ref 1–3.3)
LYMPHOCYTES # BLD AUTO: 7 % — LOW (ref 13–44)
MCHC RBC-ENTMCNC: 28.3 PG — SIGNIFICANT CHANGE UP (ref 27–34)
MCHC RBC-ENTMCNC: 31.6 GM/DL — LOW (ref 32–36)
MCV RBC AUTO: 89.6 FL — SIGNIFICANT CHANGE UP (ref 80–100)
MONOCYTES # BLD AUTO: 0.71 K/UL — SIGNIFICANT CHANGE UP (ref 0–0.9)
MONOCYTES NFR BLD AUTO: 11.1 % — SIGNIFICANT CHANGE UP (ref 2–14)
NEUTROPHILS # BLD AUTO: 5.19 K/UL — SIGNIFICANT CHANGE UP (ref 1.8–7.4)
NEUTROPHILS NFR BLD AUTO: 81.3 % — HIGH (ref 43–77)
NRBC # BLD: 0 /100 WBCS — SIGNIFICANT CHANGE UP (ref 0–0)
OB PNL STL: NEGATIVE — SIGNIFICANT CHANGE UP
PLATELET # BLD AUTO: 151 K/UL — SIGNIFICANT CHANGE UP (ref 150–400)
POTASSIUM SERPL-MCNC: 4.1 MMOL/L — SIGNIFICANT CHANGE UP (ref 3.5–5.3)
POTASSIUM SERPL-SCNC: 4.1 MMOL/L — SIGNIFICANT CHANGE UP (ref 3.5–5.3)
PROT SERPL-MCNC: 6.9 G/DL — SIGNIFICANT CHANGE UP (ref 6–8.3)
RBC # BLD: 2.97 M/UL — LOW (ref 3.8–5.2)
RBC # FLD: 15.9 % — HIGH (ref 10.3–14.5)
SODIUM SERPL-SCNC: 139 MMOL/L — SIGNIFICANT CHANGE UP (ref 135–145)
T3 SERPL-MCNC: 60 NG/DL — LOW (ref 80–200)
T4 AB SER-ACNC: 8.4 UG/DL — SIGNIFICANT CHANGE UP (ref 4.6–12)
TIBC SERPL-MCNC: 189 UG/DL — LOW (ref 220–430)
TRANSFERRIN SERPL-MCNC: 161 MG/DL — LOW (ref 200–360)
TSH SERPL-MCNC: 0.29 UIU/ML — LOW (ref 0.36–3.74)
UIBC SERPL-MCNC: 167 UG/DL — SIGNIFICANT CHANGE UP (ref 110–370)
VIT B12 SERPL-MCNC: 1642 PG/ML — HIGH (ref 232–1245)
WBC # BLD: 6.39 K/UL — SIGNIFICANT CHANGE UP (ref 3.8–10.5)
WBC # FLD AUTO: 6.39 K/UL — SIGNIFICANT CHANGE UP (ref 3.8–10.5)

## 2022-10-14 PROCEDURE — 99232 SBSQ HOSP IP/OBS MODERATE 35: CPT

## 2022-10-14 PROCEDURE — 93306 TTE W/DOPPLER COMPLETE: CPT | Mod: 26

## 2022-10-14 PROCEDURE — 99233 SBSQ HOSP IP/OBS HIGH 50: CPT

## 2022-10-14 RX ORDER — METOPROLOL TARTRATE 50 MG
50 TABLET ORAL DAILY
Refills: 0 | Status: DISCONTINUED | OUTPATIENT
Start: 2022-10-14 | End: 2022-10-19

## 2022-10-14 RX ORDER — IRON SUCROSE 20 MG/ML
200 INJECTION, SOLUTION INTRAVENOUS EVERY 24 HOURS
Refills: 0 | Status: COMPLETED | OUTPATIENT
Start: 2022-10-14 | End: 2022-10-15

## 2022-10-14 RX ORDER — POLYETHYLENE GLYCOL 3350 17 G/17G
17 POWDER, FOR SOLUTION ORAL DAILY
Refills: 0 | Status: DISCONTINUED | OUTPATIENT
Start: 2022-10-14 | End: 2022-10-22

## 2022-10-14 RX ADMIN — Medication 50 MILLIGRAM(S): at 05:42

## 2022-10-14 RX ADMIN — Medication 3 MILLIGRAM(S): at 21:18

## 2022-10-14 RX ADMIN — LOSARTAN POTASSIUM 100 MILLIGRAM(S): 100 TABLET, FILM COATED ORAL at 05:42

## 2022-10-14 RX ADMIN — IRON SUCROSE 110 MILLIGRAM(S): 20 INJECTION, SOLUTION INTRAVENOUS at 17:14

## 2022-10-14 RX ADMIN — Medication 3 MILLILITER(S): at 09:19

## 2022-10-14 RX ADMIN — HEPARIN SODIUM 5000 UNIT(S): 5000 INJECTION INTRAVENOUS; SUBCUTANEOUS at 05:42

## 2022-10-14 RX ADMIN — Medication 3 MILLILITER(S): at 21:31

## 2022-10-14 RX ADMIN — Medication 3 MILLILITER(S): at 05:04

## 2022-10-14 RX ADMIN — PANTOPRAZOLE SODIUM 40 MILLIGRAM(S): 20 TABLET, DELAYED RELEASE ORAL at 05:44

## 2022-10-14 RX ADMIN — CLOPIDOGREL BISULFATE 75 MILLIGRAM(S): 75 TABLET, FILM COATED ORAL at 13:09

## 2022-10-14 RX ADMIN — ATORVASTATIN CALCIUM 80 MILLIGRAM(S): 80 TABLET, FILM COATED ORAL at 21:18

## 2022-10-14 RX ADMIN — HEPARIN SODIUM 5000 UNIT(S): 5000 INJECTION INTRAVENOUS; SUBCUTANEOUS at 21:19

## 2022-10-14 RX ADMIN — Medication 1000 UNIT(S): at 13:10

## 2022-10-14 RX ADMIN — Medication 125 MICROGRAM(S): at 05:42

## 2022-10-14 RX ADMIN — HEPARIN SODIUM 5000 UNIT(S): 5000 INJECTION INTRAVENOUS; SUBCUTANEOUS at 13:10

## 2022-10-14 RX ADMIN — PREGABALIN 1000 MICROGRAM(S): 225 CAPSULE ORAL at 13:09

## 2022-10-14 RX ADMIN — Medication 3 MILLILITER(S): at 15:26

## 2022-10-14 RX ADMIN — Medication 40 MILLIGRAM(S): at 05:43

## 2022-10-14 RX ADMIN — POLYETHYLENE GLYCOL 3350 17 GRAM(S): 17 POWDER, FOR SOLUTION ORAL at 13:10

## 2022-10-14 RX ADMIN — MIRTAZAPINE 15 MILLIGRAM(S): 45 TABLET, ORALLY DISINTEGRATING ORAL at 21:18

## 2022-10-14 NOTE — DIETITIAN INITIAL EVALUATION ADULT - PERSON TAUGHT/METHOD
Heart Failure nutrition therapy, daily weights/verbal instruction/written material/patient instructed

## 2022-10-14 NOTE — DIETITIAN INITIAL EVALUATION ADULT - ORAL INTAKE PTA/DIET HISTORY
Pt reports good appetite on regular diet at [facility]. States that she enjoys the foods provided there. NKFA. Dislikes Ensure supplements. Weights monitored monthly at [facility] per pt report. Pt requires assistance with tray set up.

## 2022-10-14 NOTE — PROGRESS NOTE ADULT - SUBJECTIVE AND OBJECTIVE BOX
Patient is a 89y old  Female who presents with a chief complaint of SOB (13 Oct 2022 15:09)    No events overnight  Reports swelling in her legs  Patient seen and examined at bedside.    ALLERGIES:  No Known Allergies    MEDICATIONS  (STANDING):  albuterol/ipratropium for Nebulization 3 milliLiter(s) Nebulizer every 6 hours  atorvastatin 80 milliGRAM(s) Oral at bedtime  cholecalciferol 1000 Unit(s) Oral daily  clopidogrel Tablet 75 milliGRAM(s) Oral daily  cyanocobalamin 1000 MICROGram(s) Oral daily  furosemide   Injectable 40 milliGRAM(s) IV Push daily  heparin   Injectable 5000 Unit(s) SubCutaneous every 8 hours  levothyroxine 125 MICROGram(s) Oral daily  losartan 100 milliGRAM(s) Oral daily  metoprolol succinate ER 50 milliGRAM(s) Oral daily  mirtazapine 15 milliGRAM(s) Oral at bedtime  pantoprazole    Tablet 40 milliGRAM(s) Oral before breakfast  polyethylene glycol 3350 17 Gram(s) Oral daily  predniSONE   Tablet 50 milliGRAM(s) Oral daily    MEDICATIONS  (PRN):  acetaminophen     Tablet .. 650 milliGRAM(s) Oral every 6 hours PRN Temp greater or equal to 38C (100.4F), Mild Pain (1 - 3)  aluminum hydroxide/magnesium hydroxide/simethicone Suspension 30 milliLiter(s) Oral every 4 hours PRN Dyspepsia  melatonin 3 milliGRAM(s) Oral at bedtime PRN Insomnia  ondansetron Injectable 4 milliGRAM(s) IV Push every 8 hours PRN Nausea and/or Vomiting    Vital Signs Last 24 Hrs  T(F): 97.6 (14 Oct 2022 05:20), Max: 98.4 (13 Oct 2022 23:14)  HR: 74 (14 Oct 2022 09:20) (68 - 82)  BP: 164/70 (14 Oct 2022 05:20) (117/42 - 164/70)  RR: 18 (14 Oct 2022 05:20) (16 - 24)  SpO2: 98% (14 Oct 2022 09:20) (95% - 100%)  I&O's Summary    13 Oct 2022 07:01  -  14 Oct 2022 07:00  --------------------------------------------------------  IN: 0 mL / OUT: 900 mL / NET: -900 mL    14 Oct 2022 07:01  -  14 Oct 2022 13:36  --------------------------------------------------------  IN: 0 mL / OUT: 450 mL / NET: -450 mL      BMI (kg/m2): 24.6 (10-13-22 @ 11:23)  PHYSICAL EXAM:  General: NAD, A/Ox1, speaking in full sentences, oxygen via NC  ENT: MMM, no scleral icterus  Neck: Supple, No JVD, no thyroidomegaly  Lungs: Clear to auscultation bilaterally, no wheezes, no rales, no rhonchi, good inspiratory effort  Cardio: RRR, S1/S2, No murmurs  Abdomen: Soft, Nontender, Nondistended; Bowel sounds present  Extremities: +2 edema bilateral LE, No calf tenderness, no skin changes    LABS:                        8.4    6.39  )-----------( 151      ( 14 Oct 2022 06:30 )             26.6       10-14    139  |  103  |  38  ----------------------------<  143  4.1   |  28  |  1.51    Ca    9.2      14 Oct 2022 06:30    TPro  6.9  /  Alb  2.7  /  TBili  0.8  /  DBili  x   /  AST  28  /  ALT  21  /  AlkPhos  104  10-14     CARDIAC MARKERS ( 13 Oct 2022 13:45 )  x     / 18.7 ng/L / x     / x     / x      CARDIAC MARKERS ( 13 Oct 2022 12:20 )  x     / 20.4 ng/L / x     / x     / x        TSH 0.292   TSH with FT4 reflex --  Total T3 60      ABG - ( 13 Oct 2022 14:20 )  pH, Arterial: 7.43  pH, Blood: x     /  pCO2: 41    /  pO2: 102   / HCO3: 27    / Base Excess: 2.9   /  SaO2: 98.6      POCT Blood Glucose.: 220 mg/dL (13 Oct 2022 22:45)    COVID-19 PCR: NotDetec (10-13-22 @ 12:20)  COVID-19 PCR: NotDetec (10-13-22 @ 12:20)  COVID-19 PCR: NotDetec (07-03-21 @ 19:49)    Care Discussed with Consultants/Other Providers:   Cardiology: TTE, lasix IV, metoprolol succinate

## 2022-10-14 NOTE — DIETITIAN INITIAL EVALUATION ADULT - PERTINENT MEDS FT
MEDICATIONS  (STANDING):  albuterol/ipratropium for Nebulization 3 milliLiter(s) Nebulizer every 6 hours  atorvastatin 80 milliGRAM(s) Oral at bedtime  cholecalciferol 1000 Unit(s) Oral daily  clopidogrel Tablet 75 milliGRAM(s) Oral daily  cyanocobalamin 1000 MICROGram(s) Oral daily  furosemide   Injectable 40 milliGRAM(s) IV Push daily  heparin   Injectable 5000 Unit(s) SubCutaneous every 8 hours  iron sucrose IVPB 200 milliGRAM(s) IV Intermittent every 24 hours  levothyroxine 125 MICROGram(s) Oral daily  losartan 100 milliGRAM(s) Oral daily  metoprolol succinate ER 50 milliGRAM(s) Oral daily  mirtazapine 15 milliGRAM(s) Oral at bedtime  pantoprazole    Tablet 40 milliGRAM(s) Oral before breakfast  polyethylene glycol 3350 17 Gram(s) Oral daily  predniSONE   Tablet 50 milliGRAM(s) Oral daily    MEDICATIONS  (PRN):  acetaminophen     Tablet .. 650 milliGRAM(s) Oral every 6 hours PRN Temp greater or equal to 38C (100.4F), Mild Pain (1 - 3)  aluminum hydroxide/magnesium hydroxide/simethicone Suspension 30 milliLiter(s) Oral every 4 hours PRN Dyspepsia  melatonin 3 milliGRAM(s) Oral at bedtime PRN Insomnia  ondansetron Injectable 4 milliGRAM(s) IV Push every 8 hours PRN Nausea and/or Vomiting

## 2022-10-14 NOTE — PROGRESS NOTE ADULT - SUBJECTIVE AND OBJECTIVE BOX
Follow up for chf  SUBJ: Patient seems to be improving. no specific complaints  PMH  DM II    Hypertension    Depression    Hypercholesteremia    Hypothyroid    Coronary Arteriosclerosis    Carotid Artery Disease    H/O: 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    Immunization, BCG    left Rotator Cuff tear 1996    DM (diabetes mellitus)    HTN (hypertension)    HLD (hyperlipidemia)        MEDICATIONS  (STANDING):  albuterol/ipratropium for Nebulization 3 milliLiter(s) Nebulizer every 6 hours  atorvastatin 80 milliGRAM(s) Oral at bedtime  cholecalciferol 1000 Unit(s) Oral daily  clopidogrel Tablet 75 milliGRAM(s) Oral daily  cyanocobalamin 1000 MICROGram(s) Oral daily  furosemide   Injectable 40 milliGRAM(s) IV Push daily  heparin   Injectable 5000 Unit(s) SubCutaneous every 8 hours  iron sucrose IVPB 200 milliGRAM(s) IV Intermittent every 24 hours  levothyroxine 125 MICROGram(s) Oral daily  losartan 100 milliGRAM(s) Oral daily  metoprolol succinate ER 50 milliGRAM(s) Oral daily  mirtazapine 15 milliGRAM(s) Oral at bedtime  pantoprazole    Tablet 40 milliGRAM(s) Oral before breakfast  polyethylene glycol 3350 17 Gram(s) Oral daily  predniSONE   Tablet 50 milliGRAM(s) Oral daily    MEDICATIONS  (PRN):  acetaminophen     Tablet .. 650 milliGRAM(s) Oral every 6 hours PRN Temp greater or equal to 38C (100.4F), Mild Pain (1 - 3)  aluminum hydroxide/magnesium hydroxide/simethicone Suspension 30 milliLiter(s) Oral every 4 hours PRN Dyspepsia  melatonin 3 milliGRAM(s) Oral at bedtime PRN Insomnia  ondansetron Injectable 4 milliGRAM(s) IV Push every 8 hours PRN Nausea and/or Vomiting        PHYSICAL EXAM:  Vital Signs Last 24 Hrs  T(C): 36.4 (14 Oct 2022 13:40), Max: 36.9 (13 Oct 2022 23:14)  T(F): 97.5 (14 Oct 2022 13:40), Max: 98.4 (13 Oct 2022 23:14)  HR: 74 (14 Oct 2022 13:40) (70 - 82)  BP: 148/60 (14 Oct 2022 13:40) (117/42 - 164/70)  BP(mean): 82 (13 Oct 2022 21:30) (62 - 82)  RR: 15 (14 Oct 2022 13:40) (15 - 22)  SpO2: 96% (14 Oct 2022 13:40) (95% - 100%)    Parameters below as of 14 Oct 2022 13:40  Patient On (Oxygen Delivery Method): nasal cannula  O2 Flow (L/min): 2      GENERAL: NAD, well-groomed, well-developed  HEAD:  Atraumatic, Normocephalic  EYES: EOMI, PERRLA, conjunctiva and sclera clear  ENT: Moist mucous membranes,  NECK: Supple, No JVD, no bruits  CHEST/LUNG:rales at the bases  HEART: Regular rate and rhythm; No murmurs, rubs, or gallops PMI non displaced.  ABDOMEN: Soft, Nontender, Nondistended; Bowel sounds present  EXTREMITIES: edema improving  SKIN: No rashes or lesions  NERVOUS SYSTEM:  Alert & Oriented X3, Good concentration; Motor Strength 5/5 B/L upper and lower extremities; DTRs 2+ intact and symmetric      TELEMETRY:rsr        LABS:                        8.4    6.39  )-----------( 151      ( 14 Oct 2022 06:30 )             26.6     10-14    139  |  103  |  38<H>  ----------------------------<  143<H>  4.1   |  28  |  1.51<H>    Ca    9.2      14 Oct 2022 06:30    TPro  6.9  /  Alb  2.7<L>  /  TBili  0.8  /  DBili  x   /  AST  28  /  ALT  21  /  AlkPhos  104  10-14            I&O's Summary    13 Oct 2022 07:01  -  14 Oct 2022 07:00  --------------------------------------------------------  IN: 0 mL / OUT: 900 mL / NET: -900 mL    14 Oct 2022 07:01  -  14 Oct 2022 15:03  --------------------------------------------------------  IN: 0 mL / OUT: 1000 mL / NET: -1000 mL      BNP    RADIOLOGY & ADDITIONAL STUDIES:    ECHO:

## 2022-10-14 NOTE — PROGRESS NOTE ADULT - ASSESSMENT
89 year old F PMH CAD s/p CABG x3 (5/2010), DM type 2, HTN, HLD presenting to the ED from [facility] for SOB and bilateral LE edema admitted for SOB    #Acute hypoxic respiratory failure possibly due to acute CHF exacerbation vs possible COPD exacerbation  - Cardiology consulted  - TTE, lasix 40mg IV daily  - patient with SOB, now on 2L NC but not on home o2  - maintain o2 above 88%  - could be COPD exacerbation but given elevated proBNP and lower extremity edema, unsure if patient in new CHF exacerbation  - strict I&Os, daily weights  - s/p decadron, will place on prednisone 50mg x5 days  - continue duonebs    #anemia with iron deficiency  - Will give IV iron x 2 doses  - noted to have H/H 8.1, was 10 about 1 month ago  - SOB could also be in setting of anemia but pending further workup, hemodynamically stable  - FOBT negative  - transfuse <8 or if symptomatic     #CAD s/p CABG  #HTN  - continue home plavix, atorvastatin  - continue losartan and metoprolol XL 50mg daily    #hypothyroidism  - continue levothyroxine 125mcg daily    #CKD stage 3  - Cr 1.47, at baseline    #DVT ppx: heparin 5000U TID    discussed and updated son, Khris, over the phone- all questions answered

## 2022-10-14 NOTE — DIETITIAN INITIAL EVALUATION ADULT - OTHER INFO
89 year old F PMH CAD s/p CABG x3 (5/2010), DM type 2, HTN, HLD presenting to the ED from [facility] for SOB and bilateral LE edema admitted for SOB.     Pt tolerating diet with report of generally good appetite- could not recall intake at lunch today. Current weight 132lbs (10/14), +edema bilateral legs. States that her weight varies & that she rapidly lost a lot of weight in her breasts? Physical assessment with signs of mild/moderate muscle wasting/fat loss. Pt declines oral nutrition supplements, but receptive to pudding TID. Heart failure nutrition therapy reviewed with pt +written materials provided.

## 2022-10-14 NOTE — PROGRESS NOTE ADULT - ASSESSMENT
imp    Elderly woman with probable diastolic chf    suggest    continue iv lasix  continue to monitor renal function  chest x ray and bnp tomw

## 2022-10-14 NOTE — PHARMACOTHERAPY INTERVENTION NOTE - COMMENTS
Met with patient and reviewed current medications. Counseled on CHF, EF >55%. Reiterated importance of taking medications daily, limiting salt intake and daily weights. Pt verbalized understanding, all questions answered. 10 minutes spent on CHF education.

## 2022-10-14 NOTE — DIETITIAN INITIAL EVALUATION ADULT - PERTINENT LABORATORY DATA
10-14    139  |  103  |  38<H>  ----------------------------<  143<H>  4.1   |  28  |  1.51<H>    Ca    9.2      14 Oct 2022 06:30    TPro  6.9  /  Alb  2.7<L>  /  TBili  0.8  /  DBili  x   /  AST  28  /  ALT  21  /  AlkPhos  104  10-14  POCT Blood Glucose.: 220 mg/dL (10-13-22 @ 22:45)

## 2022-10-14 NOTE — DIETITIAN INITIAL EVALUATION ADULT - ADD RECOMMEND
1. Pudding TID  2. Assistance with meals. Encourage intake.   3. Ongoing diet education on Heart failure nutrition therapy  4. Add consistent carbohydrate to diet if po intake >75% most meals

## 2022-10-15 LAB
ANION GAP SERPL CALC-SCNC: 2 MMOL/L — LOW (ref 5–17)
BUN SERPL-MCNC: 45 MG/DL — HIGH (ref 7–23)
CALCIUM SERPL-MCNC: 9 MG/DL — SIGNIFICANT CHANGE UP (ref 8.4–10.5)
CHLORIDE SERPL-SCNC: 103 MMOL/L — SIGNIFICANT CHANGE UP (ref 96–108)
CO2 SERPL-SCNC: 33 MMOL/L — HIGH (ref 22–31)
CREAT SERPL-MCNC: 1.42 MG/DL — HIGH (ref 0.5–1.3)
EGFR: 35 ML/MIN/1.73M2 — LOW
GLUCOSE SERPL-MCNC: 124 MG/DL — HIGH (ref 70–99)
HCT VFR BLD CALC: 25.3 % — LOW (ref 34.5–45)
HGB BLD-MCNC: 8.1 G/DL — LOW (ref 11.5–15.5)
MCHC RBC-ENTMCNC: 27.9 PG — SIGNIFICANT CHANGE UP (ref 27–34)
MCHC RBC-ENTMCNC: 32 GM/DL — SIGNIFICANT CHANGE UP (ref 32–36)
MCV RBC AUTO: 87.2 FL — SIGNIFICANT CHANGE UP (ref 80–100)
NRBC # BLD: 0 /100 WBCS — SIGNIFICANT CHANGE UP (ref 0–0)
PLATELET # BLD AUTO: 181 K/UL — SIGNIFICANT CHANGE UP (ref 150–400)
POTASSIUM SERPL-MCNC: 3.2 MMOL/L — LOW (ref 3.5–5.3)
POTASSIUM SERPL-SCNC: 3.2 MMOL/L — LOW (ref 3.5–5.3)
RBC # BLD: 2.9 M/UL — LOW (ref 3.8–5.2)
RBC # FLD: 16 % — HIGH (ref 10.3–14.5)
SODIUM SERPL-SCNC: 138 MMOL/L — SIGNIFICANT CHANGE UP (ref 135–145)
WBC # BLD: 9.34 K/UL — SIGNIFICANT CHANGE UP (ref 3.8–10.5)
WBC # FLD AUTO: 9.34 K/UL — SIGNIFICANT CHANGE UP (ref 3.8–10.5)

## 2022-10-15 PROCEDURE — 99233 SBSQ HOSP IP/OBS HIGH 50: CPT

## 2022-10-15 PROCEDURE — 99232 SBSQ HOSP IP/OBS MODERATE 35: CPT

## 2022-10-15 RX ORDER — POTASSIUM CHLORIDE 20 MEQ
40 PACKET (EA) ORAL EVERY 4 HOURS
Refills: 0 | Status: COMPLETED | OUTPATIENT
Start: 2022-10-15 | End: 2022-10-15

## 2022-10-15 RX ADMIN — HEPARIN SODIUM 5000 UNIT(S): 5000 INJECTION INTRAVENOUS; SUBCUTANEOUS at 22:14

## 2022-10-15 RX ADMIN — Medication 50 MILLIGRAM(S): at 05:34

## 2022-10-15 RX ADMIN — Medication 125 MICROGRAM(S): at 05:35

## 2022-10-15 RX ADMIN — HEPARIN SODIUM 5000 UNIT(S): 5000 INJECTION INTRAVENOUS; SUBCUTANEOUS at 05:34

## 2022-10-15 RX ADMIN — Medication 3 MILLIGRAM(S): at 01:35

## 2022-10-15 RX ADMIN — HEPARIN SODIUM 5000 UNIT(S): 5000 INJECTION INTRAVENOUS; SUBCUTANEOUS at 14:49

## 2022-10-15 RX ADMIN — PANTOPRAZOLE SODIUM 40 MILLIGRAM(S): 20 TABLET, DELAYED RELEASE ORAL at 05:34

## 2022-10-15 RX ADMIN — Medication 1000 UNIT(S): at 12:14

## 2022-10-15 RX ADMIN — Medication 3 MILLILITER(S): at 21:11

## 2022-10-15 RX ADMIN — Medication 40 MILLIEQUIVALENT(S): at 12:13

## 2022-10-15 RX ADMIN — Medication 3 MILLILITER(S): at 15:48

## 2022-10-15 RX ADMIN — CLOPIDOGREL BISULFATE 75 MILLIGRAM(S): 75 TABLET, FILM COATED ORAL at 12:13

## 2022-10-15 RX ADMIN — ATORVASTATIN CALCIUM 80 MILLIGRAM(S): 80 TABLET, FILM COATED ORAL at 22:14

## 2022-10-15 RX ADMIN — POLYETHYLENE GLYCOL 3350 17 GRAM(S): 17 POWDER, FOR SOLUTION ORAL at 12:14

## 2022-10-15 RX ADMIN — MIRTAZAPINE 15 MILLIGRAM(S): 45 TABLET, ORALLY DISINTEGRATING ORAL at 22:14

## 2022-10-15 RX ADMIN — Medication 40 MILLIGRAM(S): at 05:33

## 2022-10-15 RX ADMIN — LOSARTAN POTASSIUM 100 MILLIGRAM(S): 100 TABLET, FILM COATED ORAL at 05:35

## 2022-10-15 RX ADMIN — PREGABALIN 1000 MICROGRAM(S): 225 CAPSULE ORAL at 12:13

## 2022-10-15 RX ADMIN — IRON SUCROSE 110 MILLIGRAM(S): 20 INJECTION, SOLUTION INTRAVENOUS at 14:48

## 2022-10-15 RX ADMIN — Medication 3 MILLILITER(S): at 08:24

## 2022-10-15 RX ADMIN — Medication 40 MILLIEQUIVALENT(S): at 17:36

## 2022-10-15 NOTE — PHYSICAL THERAPY INITIAL EVALUATION ADULT - LEVEL OF INDEPENDENCE: GAIT, REHAB EVAL
moderate assist (50% patients effort)
moderate assist (50% patients effort)
contact guard/minimum assist (75% patients effort)

## 2022-10-15 NOTE — PROGRESS NOTE ADULT - ASSESSMENT
89 year old F PMH CAD s/p CABG x3 (5/2010), DM type 2, HTN, HLD presenting to the ED from [facility] for SOB and bilateral LE edema admitted for SOB    # Acute Hypoxic Respiratory Failure due to probable Acute/chronic Diastolic CHF  # Chronic COPD  cardiology following  TTE showed preserved EF, grade 2 DD, mild LVH, mod enlarged RV  CXR on 10/23 showed cardiomegaly w/o gross airspace consolidation or effusion, BNP 8680  maintain supplemental oxygen, wean as tolerated (pt is not on home oxygen)  continue lasix 40 IV daily, monitor renal function  strict I/Os, daily weights  continue duonebs for COPD  stopped steroid taper  PT evaluation     # Anemia with Iron Deficiency  s/p iron x 2 doses  FOBT negative  monitor CBC  transfuse Hgb < 8 or is symptomatic    # CAD s/p CABG  # HTN  continue home plavix, atorvastatin  continue losartan, metoprolol xl 50 mg daily    # Hypothyroid  continue levothyroxine 125 mcg daily    # CKD stage 3  renal indices at or near baseline  avoid nephrotoxins  monitor     # DVT PPX  heparin    called in order to provide an update for karlos Pinedo [phone], all questions answered

## 2022-10-15 NOTE — PHYSICAL THERAPY INITIAL EVALUATION ADULT - CRITERIA FOR SKILLED THERAPEUTIC INTERVENTIONS
impairments found/functional limitations in following categories

## 2022-10-15 NOTE — PHYSICAL THERAPY INITIAL EVALUATION ADULT - ADDITIONAL COMMENTS
pt lives at [facility] assisted living pt was independent with ADL's and amb
pt lives at [facility]. pt uses [facility] for amb and independent with ADL's
pt lives at [facility].  independent with amb and ADL's prior to admission

## 2022-10-15 NOTE — PROGRESS NOTE ADULT - SUBJECTIVE AND OBJECTIVE BOX
Patient is a 89y old  Female who presents with a chief complaint of Heart failure     Patient seen and examined at bedside.  no acute events overnight    ALLERGIES:  No Known Allergies        Vital Signs Last 24 Hrs  T(F): 98 (15 Oct 2022 05:27), Max: 98 (14 Oct 2022 21:03)  HR: 87 (15 Oct 2022 08:44) (74 - 88)  BP: 143/58 (15 Oct 2022 05:27) (143/58 - 160/73)  RR: 16 (15 Oct 2022 05:27) (15 - 16)  SpO2: 93% (15 Oct 2022 08:44) (92% - 96%)  I&O's Summary    14 Oct 2022 07:01  -  15 Oct 2022 07:00  --------------------------------------------------------  IN: 60 mL / OUT: 1650 mL / NET: -1590 mL      MEDICATIONS:  acetaminophen     Tablet .. 650 milliGRAM(s) Oral every 6 hours PRN  albuterol/ipratropium for Nebulization 3 milliLiter(s) Nebulizer every 6 hours  aluminum hydroxide/magnesium hydroxide/simethicone Suspension 30 milliLiter(s) Oral every 4 hours PRN  atorvastatin 80 milliGRAM(s) Oral at bedtime  cholecalciferol 1000 Unit(s) Oral daily  clopidogrel Tablet 75 milliGRAM(s) Oral daily  cyanocobalamin 1000 MICROGram(s) Oral daily  furosemide   Injectable 40 milliGRAM(s) IV Push daily  heparin   Injectable 5000 Unit(s) SubCutaneous every 8 hours  iron sucrose IVPB 200 milliGRAM(s) IV Intermittent every 24 hours  levothyroxine 125 MICROGram(s) Oral daily  losartan 100 milliGRAM(s) Oral daily  melatonin 3 milliGRAM(s) Oral at bedtime PRN  metoprolol succinate ER 50 milliGRAM(s) Oral daily  mirtazapine 15 milliGRAM(s) Oral at bedtime  ondansetron Injectable 4 milliGRAM(s) IV Push every 8 hours PRN  pantoprazole    Tablet 40 milliGRAM(s) Oral before breakfast  polyethylene glycol 3350 17 Gram(s) Oral daily  potassium chloride    Tablet ER 40 milliEquivalent(s) Oral every 4 hours      PHYSICAL EXAM:  General: NAD, A/O x 1, resting at bedside  ENT: MMM, no oral thrush  Neck: Supple, No JVD  Lungs: Clear to auscultation bilaterally, bilateral air entry, good inspiratory effort  Cardio: RRR, S1/S2, No murmurs  Abdomen: Soft, Nontender, Nondistended; Bowel sounds present  Extremities: No cyanosis, +2 edema B/L    LABS:                        8.1    9.34  )-----------( 181      ( 15 Oct 2022 06:15 )             25.3     10-15    138  |  103  |  45  ----------------------------<  124  3.2   |  33  |  1.42    Ca    9.0      15 Oct 2022 06:15    TPro  6.9  /  Alb  2.7  /  TBili  0.8  /  DBili  x   /  AST  28  /  ALT  21  /  AlkPhos  104  10-14          CARDIAC MARKERS ( 13 Oct 2022 13:45 )  x     / 18.7 ng/L / x     / x     / x      CARDIAC MARKERS ( 13 Oct 2022 12:20 )  x     / 20.4 ng/L / x     / x     / x            TSH 0.292   TSH with FT4 reflex --  Total T3 60      ABG - ( 13 Oct 2022 14:20 )  pH, Arterial: 7.43  pH, Blood: x     /  pCO2: 41    /  pO2: 102   / HCO3: 27    / Base Excess: 2.9   /  SaO2: 98.6                            COVID-19 PCR: NotDetec (10-13-22 @ 12:20)      RADIOLOGY & ADDITIONAL TESTS:    Care Discussed with Consultants/Other Providers:

## 2022-10-15 NOTE — PROGRESS NOTE ADULT - SUBJECTIVE AND OBJECTIVE BOX
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    Immunization, BCG    left Rotator Cuff tear 1996    DM (diabetes mellitus)    HTN (hypertension)    HLD (hyperlipidemia)        MEDICATIONS  (STANDING):  albuterol/ipratropium for Nebulization 3 milliLiter(s) Nebulizer every 6 hours  atorvastatin 80 milliGRAM(s) Oral at bedtime  cholecalciferol 1000 Unit(s) Oral daily  clopidogrel Tablet 75 milliGRAM(s) Oral daily  cyanocobalamin 1000 MICROGram(s) Oral daily  furosemide   Injectable 40 milliGRAM(s) IV Push daily  heparin   Injectable 5000 Unit(s) SubCutaneous every 8 hours  levothyroxine 125 MICROGram(s) Oral daily  losartan 100 milliGRAM(s) Oral daily  metoprolol succinate ER 50 milliGRAM(s) Oral daily  mirtazapine 15 milliGRAM(s) Oral at bedtime  pantoprazole    Tablet 40 milliGRAM(s) Oral before breakfast  polyethylene glycol 3350 17 Gram(s) Oral daily    MEDICATIONS  (PRN):  acetaminophen     Tablet .. 650 milliGRAM(s) Oral every 6 hours PRN Temp greater or equal to 38C (100.4F), Mild Pain (1 - 3)  aluminum hydroxide/magnesium hydroxide/simethicone Suspension 30 milliLiter(s) Oral every 4 hours PRN Dyspepsia  melatonin 3 milliGRAM(s) Oral at bedtime PRN Insomnia  ondansetron Injectable 4 milliGRAM(s) IV Push every 8 hours PRN Nausea and/or Vomiting        PHYSICAL EXAM:  Vital Signs Last 24 Hrs  T(C): 36.7 (15 Oct 2022 14:31), Max: 36.7 (14 Oct 2022 21:03)  T(F): 98.1 (15 Oct 2022 14:31), Max: 98.1 (15 Oct 2022 14:31)  HR: 80 (15 Oct 2022 15:50) (80 - 88)  BP: 161/78 (15 Oct 2022 14:31) (143/58 - 161/78)  BP(mean): 86 (15 Oct 2022 05:27) (86 - 86)  RR: 15 (15 Oct 2022 14:31) (15 - 16)  SpO2: 92% (15 Oct 2022 15:50) (92% - 95%)    Parameters below as of 15 Oct 2022 15:50  Patient On (Oxygen Delivery Method): nasal cannula        GENERAL: NAD, well-groomed, well-developed  HEAD:  Atraumatic, Normocephalic  EYES: EOMI, PERRLA, conjunctiva and sclera clear  ENT: Moist mucous membranes,  NECK: Supple, No JVD, no bruits  CHEST/LUNG: Clear to percussion bilaterally; No rales, rhonchi, wheezing, or rubs  HEART: Regular rate and rhythm; No murmurs, rubs, or gallops PMI non displaced.  ABDOMEN: Soft, Nontender, Nondistended; Bowel sounds present  EXTREMITIES:  2+ Peripheral Pulses, No clubbing, cyanosis, or edema  SKIN: No rashes or lesions  NERVOUS SYSTEM:  Cranial Nerves II-XII intact      TELEMETRY:    ECG:  ECHO:    LABS:                        8.1    9.34  )-----------( 181      ( 15 Oct 2022 06:15 )             25.3     10-15    138  |  103  |  45<H>  ----------------------------<  124<H>  3.2<L>   |  33<H>  |  1.42<H>    Ca    9.0      15 Oct 2022 06:15    TPro  6.9  /  Alb  2.7<L>  /  TBili  0.8  /  DBili  x   /  AST  28  /  ALT  21  /  AlkPhos  104  10-14            I&O's Summary    14 Oct 2022 07:01  -  15 Oct 2022 07:00  --------------------------------------------------------  IN: 60 mL / OUT: 1650 mL / NET: -1590 mL    15 Oct 2022 07:01  -  15 Oct 2022 17:55  --------------------------------------------------------  IN: 0 mL / OUT: 350 mL / NET: -350 mL      BNP    RADIOLOGY & ADDITIONAL STUDIES:    ECHO:    edema/sob   patient has a remote history of open heart surgery by Dr. Mik Dacosta. we note a CAT scan which suggested a CTA to further evaluate the pulmonary artery although the pulmonary artery looks normal to me on echocardiogram.  Patient has mild right ventricular enlargement and a D-dimer is requested in order to screen for pulmonary embolism especially given immobility and right ventricular enlargement we note that the Doppler studies are negative for thrombosis.  Would consider patient for VQ scan if D-dimer suggestive.  Given RV enlargement and concern over thrombosis     I discussed the case with Khris her son by phone he prefers a conservative approach and is concerned about a dye load in the setting of renal failure which may precipitate worse issues.  discussed with Dr. Stone

## 2022-10-15 NOTE — PHYSICAL THERAPY INITIAL EVALUATION ADULT - PLANNED THERAPY INTERVENTIONS, PT EVAL
bed mobility training/gait training/transfer training

## 2022-10-15 NOTE — PHYSICAL THERAPY INITIAL EVALUATION ADULT - GENERAL OBSERVATIONS, REHAB EVAL
pt recievd sitting in bedside chair awake alert +o2 +prima fit
pt recievd sitting in bedside chair +2 Lo2 via nc awkae and alert +prima fit
pt received supine in bed. awake alert Tribe

## 2022-10-15 NOTE — PHYSICAL THERAPY INITIAL EVALUATION ADULT - DIAGNOSIS, PT EVAL
decreased strength and endurance
decreased strength and impaired balance
decreased strength and impaired balance

## 2022-10-15 NOTE — PHYSICAL THERAPY INITIAL EVALUATION ADULT - PERTINENT HX OF CURRENT PROBLEM, REHAB EVAL
Patient is a 89y old  Female who presents with a chief complaint of Heart failure
89 year old F PMH CAD s/p CABG x3 (5/2010), DM type 2, HTN, HLD presenting to the ED from [facility] for SOB and bilateral LE edema admitted for SOB    # Acute Hypoxic Respiratory Failure due to probable Acute/chronic Diastolic CHF  # Chronic COPD
Patient is a 89y old  Female who presents with a chief complaint of Heart failure

## 2022-10-16 LAB
ANION GAP SERPL CALC-SCNC: 5 MMOL/L — SIGNIFICANT CHANGE UP (ref 5–17)
BUN SERPL-MCNC: 46 MG/DL — HIGH (ref 7–23)
CALCIUM SERPL-MCNC: 8.9 MG/DL — SIGNIFICANT CHANGE UP (ref 8.4–10.5)
CHLORIDE SERPL-SCNC: 105 MMOL/L — SIGNIFICANT CHANGE UP (ref 96–108)
CO2 SERPL-SCNC: 32 MMOL/L — HIGH (ref 22–31)
CREAT SERPL-MCNC: 1.58 MG/DL — HIGH (ref 0.5–1.3)
D DIMER BLD IA.RAPID-MCNC: 275 NG/ML DDU — HIGH
EGFR: 31 ML/MIN/1.73M2 — LOW
GLUCOSE SERPL-MCNC: 132 MG/DL — HIGH (ref 70–99)
HCT VFR BLD CALC: 25.9 % — LOW (ref 34.5–45)
HGB BLD-MCNC: 8.1 G/DL — LOW (ref 11.5–15.5)
MAGNESIUM SERPL-MCNC: 1.5 MG/DL — LOW (ref 1.6–2.6)
MCHC RBC-ENTMCNC: 28 PG — SIGNIFICANT CHANGE UP (ref 27–34)
MCHC RBC-ENTMCNC: 31.3 GM/DL — LOW (ref 32–36)
MCV RBC AUTO: 89.6 FL — SIGNIFICANT CHANGE UP (ref 80–100)
NRBC # BLD: 0 /100 WBCS — SIGNIFICANT CHANGE UP (ref 0–0)
PLATELET # BLD AUTO: 200 K/UL — SIGNIFICANT CHANGE UP (ref 150–400)
POTASSIUM SERPL-MCNC: 4.6 MMOL/L — SIGNIFICANT CHANGE UP (ref 3.5–5.3)
POTASSIUM SERPL-SCNC: 4.6 MMOL/L — SIGNIFICANT CHANGE UP (ref 3.5–5.3)
RBC # BLD: 2.89 M/UL — LOW (ref 3.8–5.2)
RBC # FLD: 16.2 % — HIGH (ref 10.3–14.5)
SODIUM SERPL-SCNC: 142 MMOL/L — SIGNIFICANT CHANGE UP (ref 135–145)
WBC # BLD: 10.42 K/UL — SIGNIFICANT CHANGE UP (ref 3.8–10.5)
WBC # FLD AUTO: 10.42 K/UL — SIGNIFICANT CHANGE UP (ref 3.8–10.5)

## 2022-10-16 PROCEDURE — 99233 SBSQ HOSP IP/OBS HIGH 50: CPT

## 2022-10-16 PROCEDURE — 93010 ELECTROCARDIOGRAM REPORT: CPT

## 2022-10-16 RX ORDER — CEFTRIAXONE 500 MG/1
1000 INJECTION, POWDER, FOR SOLUTION INTRAMUSCULAR; INTRAVENOUS ONCE
Refills: 0 | Status: COMPLETED | OUTPATIENT
Start: 2022-10-16 | End: 2022-10-16

## 2022-10-16 RX ORDER — CEFTRIAXONE 500 MG/1
1000 INJECTION, POWDER, FOR SOLUTION INTRAMUSCULAR; INTRAVENOUS EVERY 24 HOURS
Refills: 0 | Status: COMPLETED | OUTPATIENT
Start: 2022-10-17 | End: 2022-10-19

## 2022-10-16 RX ORDER — FUROSEMIDE 40 MG
40 TABLET ORAL DAILY
Refills: 0 | Status: DISCONTINUED | OUTPATIENT
Start: 2022-10-17 | End: 2022-10-18

## 2022-10-16 RX ORDER — CEFTRIAXONE 500 MG/1
INJECTION, POWDER, FOR SOLUTION INTRAMUSCULAR; INTRAVENOUS
Refills: 0 | Status: COMPLETED | OUTPATIENT
Start: 2022-10-16 | End: 2022-10-20

## 2022-10-16 RX ADMIN — CEFTRIAXONE 100 MILLIGRAM(S): 500 INJECTION, POWDER, FOR SOLUTION INTRAMUSCULAR; INTRAVENOUS at 09:21

## 2022-10-16 RX ADMIN — LOSARTAN POTASSIUM 100 MILLIGRAM(S): 100 TABLET, FILM COATED ORAL at 05:08

## 2022-10-16 RX ADMIN — Medication 3 MILLILITER(S): at 15:12

## 2022-10-16 RX ADMIN — PANTOPRAZOLE SODIUM 40 MILLIGRAM(S): 20 TABLET, DELAYED RELEASE ORAL at 05:08

## 2022-10-16 RX ADMIN — HEPARIN SODIUM 5000 UNIT(S): 5000 INJECTION INTRAVENOUS; SUBCUTANEOUS at 05:07

## 2022-10-16 RX ADMIN — CLOPIDOGREL BISULFATE 75 MILLIGRAM(S): 75 TABLET, FILM COATED ORAL at 13:03

## 2022-10-16 RX ADMIN — Medication 1000 UNIT(S): at 13:03

## 2022-10-16 RX ADMIN — Medication 50 MILLIGRAM(S): at 05:08

## 2022-10-16 RX ADMIN — HEPARIN SODIUM 5000 UNIT(S): 5000 INJECTION INTRAVENOUS; SUBCUTANEOUS at 13:30

## 2022-10-16 RX ADMIN — Medication 3 MILLILITER(S): at 08:13

## 2022-10-16 RX ADMIN — Medication 3 MILLILITER(S): at 21:23

## 2022-10-16 RX ADMIN — Medication 125 MICROGRAM(S): at 05:08

## 2022-10-16 RX ADMIN — ATORVASTATIN CALCIUM 80 MILLIGRAM(S): 80 TABLET, FILM COATED ORAL at 21:41

## 2022-10-16 RX ADMIN — Medication 40 MILLIGRAM(S): at 05:08

## 2022-10-16 RX ADMIN — Medication 3 MILLILITER(S): at 05:11

## 2022-10-16 RX ADMIN — HEPARIN SODIUM 5000 UNIT(S): 5000 INJECTION INTRAVENOUS; SUBCUTANEOUS at 21:40

## 2022-10-16 RX ADMIN — POLYETHYLENE GLYCOL 3350 17 GRAM(S): 17 POWDER, FOR SOLUTION ORAL at 13:04

## 2022-10-16 RX ADMIN — PREGABALIN 1000 MICROGRAM(S): 225 CAPSULE ORAL at 13:04

## 2022-10-16 RX ADMIN — MIRTAZAPINE 15 MILLIGRAM(S): 45 TABLET, ORALLY DISINTEGRATING ORAL at 21:41

## 2022-10-16 NOTE — PROGRESS NOTE ADULT - ASSESSMENT
89 year old F PMH CAD s/p CABG x3 (5/2010), DM type 2, HTN, HLD presenting to the ED from [facility] for SOB and bilateral LE edema admitted for SOB    # Acute Hypoxic Respiratory Failure due to probable Acute/chronic Diastolic CHF  # Chronic COPD  cardiology following  TTE showed preserved EF, grade 2 DD, mild LVH, mod enlarged RV  CXR on 10/23 showed cardiomegaly w/o gross airspace consolidation or effusion, BNP 8680  maintain supplemental oxygen, wean as tolerated (pt is not on home oxygen)  continue lasix 40 IV daily, monitor renal function  strict I/Os, daily weights  continue duonebs for COPD  stopped steroid taper  PT evaluation     # Anemia with Iron Deficiency  s/p iron x 2 doses  FOBT negative  monitor CBC  transfuse Hgb < 8 or is symptomatic    # CAD s/p CABG  # HTN  continue home plavix, atorvastatin  continue losartan, metoprolol xl 50 mg daily    # Hypothyroid  continue levothyroxine 125 mcg daily    # CKD stage 3  renal indices at or near baseline  avoid nephrotoxins  monitor     # DVT PPX  heparin    called in order to provide an update for karlos Pinedo [phone], all questions answered

## 2022-10-16 NOTE — PROGRESS NOTE ADULT - NS PANP COMMENT GEN_ALL_CORE FT
AHRF due to acute on chronic diastolic CHF exacer, not COPD  UTI Ecoli  Pulm HTN noted  CKD  Anemia with iron deficiency    Will d/c prednisone  Change lasix to 40mg po daily, replace K, f/u cardiology  IV iron x 2 doses  Will give ceftriaxone x 3 doses  c/w current medications    DVT PPX  AM labs  [facility]. AHRF due to acute on chronic diastolic CHF exacer, not COPD  UTI Ecoli  Pulm HTN noted  CKD  Anemia with iron deficiency    Will d/c prednisone  Change lasix to 40mg po daily, replace K, f/u cardiology  IV iron x 2 doses  C/w ceftriaxone x 3 doses total  c/w current medications    DVT PPX  AM labs  DISP From [facility].

## 2022-10-16 NOTE — PROGRESS NOTE ADULT - SUBJECTIVE AND OBJECTIVE BOX
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    Immunization, BCG    left Rotator Cuff tear 1996    DM (diabetes mellitus)    HTN (hypertension)    HLD (hyperlipidemia)        MEDICATIONS  (STANDING):  albuterol/ipratropium for Nebulization 3 milliLiter(s) Nebulizer every 6 hours  atorvastatin 80 milliGRAM(s) Oral at bedtime  cefTRIAXone   IVPB      cholecalciferol 1000 Unit(s) Oral daily  clopidogrel Tablet 75 milliGRAM(s) Oral daily  cyanocobalamin 1000 MICROGram(s) Oral daily  heparin   Injectable 5000 Unit(s) SubCutaneous every 8 hours  levothyroxine 125 MICROGram(s) Oral daily  losartan 100 milliGRAM(s) Oral daily  metoprolol succinate ER 50 milliGRAM(s) Oral daily  mirtazapine 15 milliGRAM(s) Oral at bedtime  pantoprazole    Tablet 40 milliGRAM(s) Oral before breakfast  polyethylene glycol 3350 17 Gram(s) Oral daily    MEDICATIONS  (PRN):  acetaminophen     Tablet .. 650 milliGRAM(s) Oral every 6 hours PRN Temp greater or equal to 38C (100.4F), Mild Pain (1 - 3)  aluminum hydroxide/magnesium hydroxide/simethicone Suspension 30 milliLiter(s) Oral every 4 hours PRN Dyspepsia  melatonin 3 milliGRAM(s) Oral at bedtime PRN Insomnia  ondansetron Injectable 4 milliGRAM(s) IV Push every 8 hours PRN Nausea and/or Vomiting        PHYSICAL EXAM:  Vital Signs Last 24 Hrs  T(C): 37.1 (16 Oct 2022 20:12), Max: 37.1 (16 Oct 2022 20:12)  T(F): 98.8 (16 Oct 2022 20:12), Max: 98.8 (16 Oct 2022 20:12)  HR: 88 (16 Oct 2022 20:12) (71 - 92)  BP: 151/77 (16 Oct 2022 20:12) (137/68 - 151/77)  BP(mean): --  RR: 17 (16 Oct 2022 20:12) (17 - 17)  SpO2: 96% (16 Oct 2022 20:12) (92% - 97%)    Parameters below as of 16 Oct 2022 20:12  Patient On (Oxygen Delivery Method): nasal cannula  O2 Flow (L/min): 2      GENERAL: NAD, well-groomed, well-developed appears stated age  HEAD:  Atraumatic, Normocephalic  EYES: EOMI, PERRLA, conjunctiva and sclera clear  ENT: Moist mucous membranes,  NECK: Supple, No JVD, no bruits  CHEST/LUNG: Clear to percussion bilaterally; No rales, rhonchi, wheezing, or rubs  HEART: Regular rate and rhythm; No murmurs, rubs, or gallops PMI non displaced.  ABDOMEN: Soft, Nontender, Nondistended; Bowel sounds present  EXTREMITIES:  2+ Peripheral Pulses, No clubbing, cyanosis, or edema  SKIN: No rashes or lesions  NERVOUS SYSTEM:  Cranial Nerves II-XII intact      TELEMETRY:    ECG:    < from: 12 Lead ECG (10.13.22 @ 18:15) >    When compared with ECG of 13-OCT-2022 11:37,  premature atrial complexes are no longer present  Nonspecific T wave abnormality has replaced inverted T waves in Inferior leads  Confirmed by ALLYN FUENTES MD ([id number]) on 10/14/2022 9:29:28 AM    < end of copied text >    ECHO:    < from: TTE Echo Complete w/o Contrast w/ Doppler (10.14.22 @ 10:56) >  Summary:   1. Biatrial enlargement   2. Left ventricular ejection fraction, by visual estimation, is 60 to   65%.   3. Normal global left ventricular systolic function.   4. Normal left ventricular internal cavity size.   5. Spectral Doppler shows pseudonormal pattern of left ventricular   myocardial filling (Grade II diastolic dysfunction).   6. There is mild concentric left ventricular hypertrophy.   7. Mildly reduced RV systolic function.   8. Moderately enlarged right ventricle.   9. Mild mitral annular calcification.  10. Trace mitral valve regurgitation.  11. Mild tricuspid regurgitation.  12. Mild aortic regurgitation.  13. Mild to moderate pulmonic valve regurgitation.  14. Estimated pulmonary artery systolic pressure is 40.4 mmHg assuming a   right atrial pressure of 15 mmHg, which is consistent with mild pulmonary   hypertension.    [id number] Allyn Fuentes , Electronically signed on   10/14/2022 at 2:42:13 PM        *** Final ***    < end of copied text >      LABS:                        8.1    10.42 )-----------( 200      ( 16 Oct 2022 06:30 )             25.9     10-16    142  |  105  |  46<H>  ----------------------------<  132<H>  4.6   |  32<H>  |  1.58<H>    Ca    8.9      16 Oct 2022 06:30  Mg     1.5     10-16              I&O's Summary    15 Oct 2022 07:01  -  16 Oct 2022 07:00  --------------------------------------------------------  IN: 100 mL / OUT: 950 mL / NET: -850 mL    16 Oct 2022 07:01  -  16 Oct 2022 22:11  --------------------------------------------------------  IN: 0 mL / OUT: 1400 mL / NET: -1400 mL      BNP    RADIOLOGY & ADDITIONAL STUDIES:    < from: CT Chest No Cont (10.13.22 @ 17:38) >    IMPRESSION:    Status post median sternotomy with sternal nonunion. The pulmonary artery   trunk protrudes between the fragments of the sternum (2:56). Follow-up   surgical consultation and/or CTA of the chest is recommended.    Small bibasilar atelectasis. No focal consolidation or pleural effusion.    Large hiatal hernia.    Additional findings as above.    --- End of Report ---            EMILIE LAWTON MD; Attending Radiologist  This document has been electronically signed. Oct 13 2022  8:59PM    < end of copied text >      ECHO:      impression  abnorma ct scan  care is coordinated with dr donato at [facility] and prior ct were reviewed demonstrating a gradual sternal dehiscence the above findings are consistent with the nature progression of this condition and do not warrant further investigation at this point given patients' asymptomatic status.  the echo is reviewed and the pulmonary artery itself is normal. most likely it is the cardiac  infundibulum that is projecting forward due ot the dehiscence     discussed with archana reilly

## 2022-10-16 NOTE — PROGRESS NOTE ADULT - SUBJECTIVE AND OBJECTIVE BOX
Patient is a 89y old  Female who presents with a chief complaint of SOB      Patient seen and examined at bedside. Pt states she feels well, denies overnight events or current complaints including chest pain, shortness of breath, dizziness, nausea, vomiting, diarrhea, fever or chills.      ALLERGIES:  No Known Allergies    MEDICATIONS  (STANDING):  albuterol/ipratropium for Nebulization 3 milliLiter(s) Nebulizer every 6 hours  atorvastatin 80 milliGRAM(s) Oral at bedtime  cefTRIAXone   IVPB      cholecalciferol 1000 Unit(s) Oral daily  clopidogrel Tablet 75 milliGRAM(s) Oral daily  cyanocobalamin 1000 MICROGram(s) Oral daily  furosemide   Injectable 40 milliGRAM(s) IV Push daily  heparin   Injectable 5000 Unit(s) SubCutaneous every 8 hours  levothyroxine 125 MICROGram(s) Oral daily  losartan 100 milliGRAM(s) Oral daily  metoprolol succinate ER 50 milliGRAM(s) Oral daily  mirtazapine 15 milliGRAM(s) Oral at bedtime  pantoprazole    Tablet 40 milliGRAM(s) Oral before breakfast  polyethylene glycol 3350 17 Gram(s) Oral daily    MEDICATIONS  (PRN):  acetaminophen     Tablet .. 650 milliGRAM(s) Oral every 6 hours PRN Temp greater or equal to 38C (100.4F), Mild Pain (1 - 3)  aluminum hydroxide/magnesium hydroxide/simethicone Suspension 30 milliLiter(s) Oral every 4 hours PRN Dyspepsia  melatonin 3 milliGRAM(s) Oral at bedtime PRN Insomnia  ondansetron Injectable 4 milliGRAM(s) IV Push every 8 hours PRN Nausea and/or Vomiting    Vital Signs Last 24 Hrs  T(F): 98.1 (16 Oct 2022 05:49), Max: 98.3 (15 Oct 2022 20:57)  HR: 71 (16 Oct 2022 08:30) (71 - 87)  BP: 137/68 (16 Oct 2022 05:49) (137/68 - 161/78)  RR: 17 (16 Oct 2022 05:49) (15 - 17)  SpO2: 97% (16 Oct 2022 09:38) (92% - 97%)  I&O's Summary    15 Oct 2022 07:01  -  16 Oct 2022 07:00  --------------------------------------------------------  IN: 100 mL / OUT: 950 mL / NET: -850 mL    16 Oct 2022 07:01  -  16 Oct 2022 12:45  --------------------------------------------------------  IN: 0 mL / OUT: 800 mL / NET: -800 mL      PHYSICAL EXAM:  General: NAD, Pleasantly confused  ENT: MMM, no oral thrush   Neck: Supple, No JVD  Lungs: Clear to auscultation bilaterally, non labored breathing  Cardio: RRR, S1/S2, No murmurs  Abdomen: Soft, Nontender, Nondistended; Bowel sounds present  Extremities: No calf tenderness, 1+ edema b/l    LABS:                        8.1    10.42 )-----------( 200      ( 16 Oct 2022 06:30 )             25.9     10-16    142  |  105  |  46  ----------------------------<  132  4.6   |  32  |  1.58    Ca    8.9      16 Oct 2022 06:30  Mg     1.5     10-16    TPro  6.9  /  Alb  2.7  /  TBili  0.8  /  DBili  x   /  AST  28  /  ALT  21  /  AlkPhos  104  10-14          CARDIAC MARKERS ( 13 Oct 2022 13:45 )  x     / 18.7 ng/L / x     / x     / x            TSH 0.292   TSH with FT4 reflex --  Total T3 60      ABG - ( 13 Oct 2022 14:20 )  pH, Arterial: 7.43  pH, Blood: x     /  pCO2: 41    /  pO2: 102   / HCO3: 27    / Base Excess: 2.9   /  SaO2: 98.6                            COVID-19 PCR: NotDetec (10-13-22 @ 12:20)      RADIOLOGY & ADDITIONAL TESTS:  < from: CT Chest No Cont (10.13.22 @ 17:38) >  IMPRESSION:    Status post median sternotomy with sternal nonunion. The pulmonary artery   trunk protrudes between the fragments of the sternum (2:56). Follow-up   surgical consultation and/or CTA of the chest is recommended.    Small bibasilar atelectasis. No focal consolidation or pleural effusion.    Large hiatal hernia.    Additional findings as above.    --- End of Report ---            EMILIE LAWTON MD; Attending Radiologist  This document has been electronically signed. Oct 13 2022  8:59PM    < end of copied text >    Care Discussed with Consultants/Other Providers:   Cardiology Dr. Elise  Patient is a 89y old  Female who presents with a chief complaint of SOB      Patient seen and examined at bedside. Pt states she feels well, denies overnight events or current complaints including chest pain, shortness of breath, dizziness, nausea, vomiting, diarrhea, fever or chills.      ALLERGIES:  No Known Allergies    MEDICATIONS  (STANDING):  albuterol/ipratropium for Nebulization 3 milliLiter(s) Nebulizer every 6 hours  atorvastatin 80 milliGRAM(s) Oral at bedtime  cefTRIAXone   IVPB      cholecalciferol 1000 Unit(s) Oral daily  clopidogrel Tablet 75 milliGRAM(s) Oral daily  cyanocobalamin 1000 MICROGram(s) Oral daily  furosemide   Injectable 40 milliGRAM(s) IV Push daily  heparin   Injectable 5000 Unit(s) SubCutaneous every 8 hours  levothyroxine 125 MICROGram(s) Oral daily  losartan 100 milliGRAM(s) Oral daily  metoprolol succinate ER 50 milliGRAM(s) Oral daily  mirtazapine 15 milliGRAM(s) Oral at bedtime  pantoprazole    Tablet 40 milliGRAM(s) Oral before breakfast  polyethylene glycol 3350 17 Gram(s) Oral daily    MEDICATIONS  (PRN):  acetaminophen     Tablet .. 650 milliGRAM(s) Oral every 6 hours PRN Temp greater or equal to 38C (100.4F), Mild Pain (1 - 3)  aluminum hydroxide/magnesium hydroxide/simethicone Suspension 30 milliLiter(s) Oral every 4 hours PRN Dyspepsia  melatonin 3 milliGRAM(s) Oral at bedtime PRN Insomnia  ondansetron Injectable 4 milliGRAM(s) IV Push every 8 hours PRN Nausea and/or Vomiting    Vital Signs Last 24 Hrs  T(F): 98.1 (16 Oct 2022 05:49), Max: 98.3 (15 Oct 2022 20:57)  HR: 71 (16 Oct 2022 08:30) (71 - 87)  BP: 137/68 (16 Oct 2022 05:49) (137/68 - 161/78)  RR: 17 (16 Oct 2022 05:49) (15 - 17)  SpO2: 97% (16 Oct 2022 09:38) (92% - 97%)  I&O's Summary    15 Oct 2022 07:01  -  16 Oct 2022 07:00  --------------------------------------------------------  IN: 100 mL / OUT: 950 mL / NET: -850 mL    16 Oct 2022 07:01  -  16 Oct 2022 12:45  --------------------------------------------------------  IN: 0 mL / OUT: 800 mL / NET: -800 mL      PHYSICAL EXAM:  General: NAD, Pleasantly confused  ENT: MMM, no oral thrush   Neck: Supple, No JVD  Lungs: Clear to auscultation bilaterally, non labored breathing  Cardio: RRR, S1/S2, No murmurs  Abdomen: Soft, Nontender, Nondistended; Bowel sounds present  Extremities: No calf tenderness, 1+ edema b/l    LABS:                        8.1    10.42 )-----------( 200      ( 16 Oct 2022 06:30 )             25.9     10-16    142  |  105  |  46  ----------------------------<  132  4.6   |  32  |  1.58    Ca    8.9      16 Oct 2022 06:30  Mg     1.5     10-16    TPro  6.9  /  Alb  2.7  /  TBili  0.8  /  DBili  x   /  AST  28  /  ALT  21  /  AlkPhos  104  10-14    CARDIAC MARKERS ( 13 Oct 2022 13:45 )  x     / 18.7 ng/L / x     / x     / x        TSH 0.292   TSH with FT4 reflex --  Total T3 60    ABG - ( 13 Oct 2022 14:20 )  pH, Arterial: 7.43  pH, Blood: x     /  pCO2: 41    /  pO2: 102   / HCO3: 27    / Base Excess: 2.9   /  SaO2: 98.6      COVID-19 PCR: NotDetec (10-13-22 @ 12:20)      RADIOLOGY & ADDITIONAL TESTS:  < from: CT Chest No Cont (10.13.22 @ 17:38) >  IMPRESSION:    Status post median sternotomy with sternal nonunion. The pulmonary artery   trunk protrudes between the fragments of the sternum (2:56). Follow-up   surgical consultation and/or CTA of the chest is recommended.    Small bibasilar atelectasis. No focal consolidation or pleural effusion.    Large hiatal hernia.    Additional findings as above.      EMILIE LAWTON MD; Attending Radiologist  This document has been electronically signed. Oct 13 2022  8:59PM    Care Discussed with Consultants/Other Providers:   Cardiology Dr. Elise

## 2022-10-17 LAB
ANION GAP SERPL CALC-SCNC: 5 MMOL/L — SIGNIFICANT CHANGE UP (ref 5–17)
BUN SERPL-MCNC: 43 MG/DL — HIGH (ref 7–23)
CALCIUM SERPL-MCNC: 8.7 MG/DL — SIGNIFICANT CHANGE UP (ref 8.4–10.5)
CHLORIDE SERPL-SCNC: 103 MMOL/L — SIGNIFICANT CHANGE UP (ref 96–108)
CO2 SERPL-SCNC: 34 MMOL/L — HIGH (ref 22–31)
CREAT SERPL-MCNC: 1.65 MG/DL — HIGH (ref 0.5–1.3)
EGFR: 29 ML/MIN/1.73M2 — LOW
GLUCOSE SERPL-MCNC: 152 MG/DL — HIGH (ref 70–99)
HCT VFR BLD CALC: 25.5 % — LOW (ref 34.5–45)
HGB BLD-MCNC: 8.2 G/DL — LOW (ref 11.5–15.5)
MCHC RBC-ENTMCNC: 28 PG — SIGNIFICANT CHANGE UP (ref 27–34)
MCHC RBC-ENTMCNC: 32.2 GM/DL — SIGNIFICANT CHANGE UP (ref 32–36)
MCV RBC AUTO: 87 FL — SIGNIFICANT CHANGE UP (ref 80–100)
NRBC # BLD: 0 /100 WBCS — SIGNIFICANT CHANGE UP (ref 0–0)
PLATELET # BLD AUTO: 220 K/UL — SIGNIFICANT CHANGE UP (ref 150–400)
POTASSIUM SERPL-MCNC: 3.9 MMOL/L — SIGNIFICANT CHANGE UP (ref 3.5–5.3)
POTASSIUM SERPL-SCNC: 3.9 MMOL/L — SIGNIFICANT CHANGE UP (ref 3.5–5.3)
RBC # BLD: 2.93 M/UL — LOW (ref 3.8–5.2)
RBC # FLD: 16.6 % — HIGH (ref 10.3–14.5)
SODIUM SERPL-SCNC: 142 MMOL/L — SIGNIFICANT CHANGE UP (ref 135–145)
WBC # BLD: 11.85 K/UL — HIGH (ref 3.8–10.5)
WBC # FLD AUTO: 11.85 K/UL — HIGH (ref 3.8–10.5)

## 2022-10-17 PROCEDURE — 99233 SBSQ HOSP IP/OBS HIGH 50: CPT

## 2022-10-17 RX ADMIN — HEPARIN SODIUM 5000 UNIT(S): 5000 INJECTION INTRAVENOUS; SUBCUTANEOUS at 13:50

## 2022-10-17 RX ADMIN — Medication 50 MILLIGRAM(S): at 05:50

## 2022-10-17 RX ADMIN — Medication 40 MILLIGRAM(S): at 05:50

## 2022-10-17 RX ADMIN — Medication 125 MICROGRAM(S): at 05:50

## 2022-10-17 RX ADMIN — HEPARIN SODIUM 5000 UNIT(S): 5000 INJECTION INTRAVENOUS; SUBCUTANEOUS at 05:50

## 2022-10-17 RX ADMIN — CLOPIDOGREL BISULFATE 75 MILLIGRAM(S): 75 TABLET, FILM COATED ORAL at 11:55

## 2022-10-17 RX ADMIN — Medication 3 MILLILITER(S): at 08:55

## 2022-10-17 RX ADMIN — LOSARTAN POTASSIUM 100 MILLIGRAM(S): 100 TABLET, FILM COATED ORAL at 05:50

## 2022-10-17 RX ADMIN — Medication 1000 UNIT(S): at 11:55

## 2022-10-17 RX ADMIN — CEFTRIAXONE 100 MILLIGRAM(S): 500 INJECTION, POWDER, FOR SOLUTION INTRAMUSCULAR; INTRAVENOUS at 08:26

## 2022-10-17 RX ADMIN — ATORVASTATIN CALCIUM 80 MILLIGRAM(S): 80 TABLET, FILM COATED ORAL at 22:14

## 2022-10-17 RX ADMIN — PREGABALIN 1000 MICROGRAM(S): 225 CAPSULE ORAL at 11:55

## 2022-10-17 RX ADMIN — POLYETHYLENE GLYCOL 3350 17 GRAM(S): 17 POWDER, FOR SOLUTION ORAL at 11:54

## 2022-10-17 RX ADMIN — PANTOPRAZOLE SODIUM 40 MILLIGRAM(S): 20 TABLET, DELAYED RELEASE ORAL at 05:50

## 2022-10-17 RX ADMIN — Medication 3 MILLILITER(S): at 16:15

## 2022-10-17 RX ADMIN — MIRTAZAPINE 15 MILLIGRAM(S): 45 TABLET, ORALLY DISINTEGRATING ORAL at 22:14

## 2022-10-17 RX ADMIN — HEPARIN SODIUM 5000 UNIT(S): 5000 INJECTION INTRAVENOUS; SUBCUTANEOUS at 22:14

## 2022-10-17 RX ADMIN — Medication 3 MILLILITER(S): at 21:35

## 2022-10-17 NOTE — PROGRESS NOTE ADULT - ASSESSMENT
89 year old F PMH CAD s/p CABG x3 (5/2010), DM type 2, HTN, HLD presenting to the ED from [facility] for SOB and bilateral LE edema admitted for SOB    # Acute Hypoxic Respiratory Failure due to probable Acute/chronic Diastolic CHF  # Chronic COPD  cardiology following  TTE showed preserved EF, grade 2 DD, mild LVH, mod enlarged RV  CXR on 10/23 showed cardiomegaly w/o gross airspace consolidation or effusion, BNP 8680  maintain supplemental oxygen, wean as tolerated (pt is not on home oxygen)  IV lasix transitioned to PO  strict I/Os, daily weights  continue duonebs for COPD  stopped steroid taper  PT evaluation     # Anemia with Iron Deficiency  s/p iron x 2 doses  FOBT negative  monitor CBC  transfuse Hgb < 8 or is symptomatic    # CAD s/p CABG  # HTN  continue home plavix, atorvastatin  continue losartan, metoprolol xl 50 mg daily    # Hypothyroid  continue levothyroxine 125 mcg daily    # CKD stage 3  renal indices at or near baseline  avoid nephrotoxins  monitor     # DVT PPX  heparin    updated son Khris [phone]

## 2022-10-17 NOTE — GOALS OF CARE CONVERSATION - ADVANCED CARE PLANNING - AGENT'S NAME
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    1) SOB  - feeling ok today, no further episodes of SOB   - symptoms may have been triggered by ? gastroparesis as her symptoms occurred when she ate- recommend GI eval --had EGD-await path  - CT chest does show some mild tree in bud likely due to mucus plugging and combined with increased cough and change in sputum color it is reasonable to treat for tracheobronchitis - pt is currently on bactrim and should complete a 7 day course--Finished  - add nebulized 3% hypertonic saline (to be given after duonebs) to help with clearance  - Chest PT, incentive spirometry, OOB as tolerated   - keep O2 sats >92%  - check O2 sat on RA with ambulation     2) Asthma  - does not appear to have an asthma exacerbation at this time   -  steroids reduced  to home dose of medrol  - spiriva and symbicort bid Khris

## 2022-10-17 NOTE — PROGRESS NOTE ADULT - SUBJECTIVE AND OBJECTIVE BOX
Patient is a 89y old  Female who presents with a chief complaint of SOB       Patient seen and examined at bedside. Pt states they feel well, denies overnight events or current complaints including chest pain, shortness of breath, dizziness, nausea, vomiting, diarrhea, fever or chills.      ALLERGIES:  No Known Allergies    MEDICATIONS  (STANDING):  albuterol/ipratropium for Nebulization 3 milliLiter(s) Nebulizer every 6 hours  atorvastatin 80 milliGRAM(s) Oral at bedtime  cefTRIAXone   IVPB      cefTRIAXone   IVPB 1000 milliGRAM(s) IV Intermittent every 24 hours  cholecalciferol 1000 Unit(s) Oral daily  clopidogrel Tablet 75 milliGRAM(s) Oral daily  cyanocobalamin 1000 MICROGram(s) Oral daily  furosemide    Tablet 40 milliGRAM(s) Oral daily  heparin   Injectable 5000 Unit(s) SubCutaneous every 8 hours  levothyroxine 125 MICROGram(s) Oral daily  losartan 100 milliGRAM(s) Oral daily  metoprolol succinate ER 50 milliGRAM(s) Oral daily  mirtazapine 15 milliGRAM(s) Oral at bedtime  pantoprazole    Tablet 40 milliGRAM(s) Oral before breakfast  polyethylene glycol 3350 17 Gram(s) Oral daily    MEDICATIONS  (PRN):  acetaminophen     Tablet .. 650 milliGRAM(s) Oral every 6 hours PRN Temp greater or equal to 38C (100.4F), Mild Pain (1 - 3)  aluminum hydroxide/magnesium hydroxide/simethicone Suspension 30 milliLiter(s) Oral every 4 hours PRN Dyspepsia  melatonin 3 milliGRAM(s) Oral at bedtime PRN Insomnia  ondansetron Injectable 4 milliGRAM(s) IV Push every 8 hours PRN Nausea and/or Vomiting    Vital Signs Last 24 Hrs  T(F): 98.4 (17 Oct 2022 12:41), Max: 98.8 (16 Oct 2022 20:12)  HR: 102 (17 Oct 2022 12:41) (77 - 102)  BP: 133/76 (17 Oct 2022 12:41) (133/76 - 161/62)  RR: 18 (17 Oct 2022 12:41) (16 - 18)  SpO2: 93% (17 Oct 2022 12:41) (88% - 97%)  I&O's Summary    16 Oct 2022 07:01  -  17 Oct 2022 07:00  --------------------------------------------------------  IN: 0 mL / OUT: 1400 mL / NET: -1400 mL      PHYSICAL EXAM:  General: NAD, Pleasantly confused  ENT: MMM, no oral thrush   Neck: Supple, No JVD  Lungs: Clear to auscultation bilaterally, non labored breathing  Cardio: RRR, S1/S2, No murmurs  Abdomen: Soft, Nontender, Nondistended; Bowel sounds present  Extremities: No calf tenderness, 1+ edema b/l    LABS:                        8.2    11.85 )-----------( 220      ( 17 Oct 2022 07:55 )             25.5     10-17    142  |  103  |  43  ----------------------------<  152  3.9   |  34  |  1.65    Ca    8.7      17 Oct 2022 07:55  Mg     1.5     10-16            COVID-19 PCR: [facility] (10-13-22 @ 12:20)      RADIOLOGY & ADDITIONAL TESTS:    Care Discussed with Consultants/Other Providers:

## 2022-10-17 NOTE — GOALS OF CARE CONVERSATION - ADVANCED CARE PLANNING - CONVERSATION DETAILS
Met with patient at bedside. She is A&&Ox3. Retired Psychiatrist. Comes from [facility] assisted living with increased SOB, lower leg edema. Hx. DM, CHF, HTN, CAD, CABGx3. Son at bedside. I asked patient about GOC. She gave verbal consent for MOLST: DNR/I, limited medical intervention, send to hospital, no feeding tube, allow trial of IV fluids, use abx.

## 2022-10-18 LAB
ANION GAP SERPL CALC-SCNC: 5 MMOL/L — SIGNIFICANT CHANGE UP (ref 5–17)
BUN SERPL-MCNC: 40 MG/DL — HIGH (ref 7–23)
CALCIUM SERPL-MCNC: 8.8 MG/DL — SIGNIFICANT CHANGE UP (ref 8.4–10.5)
CHLORIDE SERPL-SCNC: 103 MMOL/L — SIGNIFICANT CHANGE UP (ref 96–108)
CO2 SERPL-SCNC: 35 MMOL/L — HIGH (ref 22–31)
CREAT SERPL-MCNC: 1.5 MG/DL — HIGH (ref 0.5–1.3)
EGFR: 32 ML/MIN/1.73M2 — LOW
GLUCOSE SERPL-MCNC: 119 MG/DL — HIGH (ref 70–99)
HCT VFR BLD CALC: 26.3 % — LOW (ref 34.5–45)
HGB BLD-MCNC: 8.2 G/DL — LOW (ref 11.5–15.5)
MAGNESIUM SERPL-MCNC: 1.7 MG/DL — SIGNIFICANT CHANGE UP (ref 1.6–2.6)
MCHC RBC-ENTMCNC: 27.8 PG — SIGNIFICANT CHANGE UP (ref 27–34)
MCHC RBC-ENTMCNC: 31.2 GM/DL — LOW (ref 32–36)
MCV RBC AUTO: 89.2 FL — SIGNIFICANT CHANGE UP (ref 80–100)
NRBC # BLD: 0 /100 WBCS — SIGNIFICANT CHANGE UP (ref 0–0)
PLATELET # BLD AUTO: 218 K/UL — SIGNIFICANT CHANGE UP (ref 150–400)
POTASSIUM SERPL-MCNC: 3.9 MMOL/L — SIGNIFICANT CHANGE UP (ref 3.5–5.3)
POTASSIUM SERPL-SCNC: 3.9 MMOL/L — SIGNIFICANT CHANGE UP (ref 3.5–5.3)
RBC # BLD: 2.95 M/UL — LOW (ref 3.8–5.2)
RBC # FLD: 16.8 % — HIGH (ref 10.3–14.5)
SODIUM SERPL-SCNC: 143 MMOL/L — SIGNIFICANT CHANGE UP (ref 135–145)
WBC # BLD: 9.8 K/UL — SIGNIFICANT CHANGE UP (ref 3.8–10.5)
WBC # FLD AUTO: 9.8 K/UL — SIGNIFICANT CHANGE UP (ref 3.8–10.5)

## 2022-10-18 PROCEDURE — 99232 SBSQ HOSP IP/OBS MODERATE 35: CPT

## 2022-10-18 PROCEDURE — 71045 X-RAY EXAM CHEST 1 VIEW: CPT | Mod: 26

## 2022-10-18 RX ORDER — FERROUS SULFATE 325(65) MG
325 TABLET ORAL
Refills: 0 | Status: DISCONTINUED | OUTPATIENT
Start: 2022-10-18 | End: 2022-10-22

## 2022-10-18 RX ORDER — ASCORBIC ACID 60 MG
500 TABLET,CHEWABLE ORAL DAILY
Refills: 0 | Status: DISCONTINUED | OUTPATIENT
Start: 2022-10-18 | End: 2022-10-19

## 2022-10-18 RX ORDER — FUROSEMIDE 40 MG
60 TABLET ORAL DAILY
Refills: 0 | Status: DISCONTINUED | OUTPATIENT
Start: 2022-10-19 | End: 2022-10-19

## 2022-10-18 RX ORDER — POTASSIUM CHLORIDE 20 MEQ
40 PACKET (EA) ORAL ONCE
Refills: 0 | Status: COMPLETED | OUTPATIENT
Start: 2022-10-18 | End: 2022-10-18

## 2022-10-18 RX ORDER — MAGNESIUM SULFATE 500 MG/ML
2 VIAL (ML) INJECTION ONCE
Refills: 0 | Status: COMPLETED | OUTPATIENT
Start: 2022-10-18 | End: 2022-10-18

## 2022-10-18 RX ORDER — FUROSEMIDE 40 MG
20 TABLET ORAL ONCE
Refills: 0 | Status: COMPLETED | OUTPATIENT
Start: 2022-10-18 | End: 2022-10-18

## 2022-10-18 RX ADMIN — HEPARIN SODIUM 5000 UNIT(S): 5000 INJECTION INTRAVENOUS; SUBCUTANEOUS at 21:19

## 2022-10-18 RX ADMIN — Medication 40 MILLIGRAM(S): at 05:01

## 2022-10-18 RX ADMIN — Medication 20 MILLIGRAM(S): at 14:11

## 2022-10-18 RX ADMIN — Medication 1000 UNIT(S): at 11:26

## 2022-10-18 RX ADMIN — CLOPIDOGREL BISULFATE 75 MILLIGRAM(S): 75 TABLET, FILM COATED ORAL at 11:26

## 2022-10-18 RX ADMIN — PREGABALIN 1000 MICROGRAM(S): 225 CAPSULE ORAL at 11:26

## 2022-10-18 RX ADMIN — Medication 3 MILLILITER(S): at 09:33

## 2022-10-18 RX ADMIN — LOSARTAN POTASSIUM 100 MILLIGRAM(S): 100 TABLET, FILM COATED ORAL at 05:00

## 2022-10-18 RX ADMIN — Medication 325 MILLIGRAM(S): at 17:30

## 2022-10-18 RX ADMIN — POLYETHYLENE GLYCOL 3350 17 GRAM(S): 17 POWDER, FOR SOLUTION ORAL at 12:17

## 2022-10-18 RX ADMIN — Medication 3 MILLILITER(S): at 15:55

## 2022-10-18 RX ADMIN — Medication 50 MILLIGRAM(S): at 05:00

## 2022-10-18 RX ADMIN — Medication 40 MILLIEQUIVALENT(S): at 17:29

## 2022-10-18 RX ADMIN — Medication 25 GRAM(S): at 17:30

## 2022-10-18 RX ADMIN — Medication 125 MICROGRAM(S): at 05:00

## 2022-10-18 RX ADMIN — PANTOPRAZOLE SODIUM 40 MILLIGRAM(S): 20 TABLET, DELAYED RELEASE ORAL at 05:00

## 2022-10-18 RX ADMIN — HEPARIN SODIUM 5000 UNIT(S): 5000 INJECTION INTRAVENOUS; SUBCUTANEOUS at 05:01

## 2022-10-18 RX ADMIN — CEFTRIAXONE 100 MILLIGRAM(S): 500 INJECTION, POWDER, FOR SOLUTION INTRAMUSCULAR; INTRAVENOUS at 06:57

## 2022-10-18 RX ADMIN — HEPARIN SODIUM 5000 UNIT(S): 5000 INJECTION INTRAVENOUS; SUBCUTANEOUS at 14:10

## 2022-10-18 RX ADMIN — Medication 3 MILLILITER(S): at 21:26

## 2022-10-18 RX ADMIN — Medication 3 MILLILITER(S): at 04:23

## 2022-10-18 NOTE — PROGRESS NOTE ADULT - SUBJECTIVE AND OBJECTIVE BOX
Patient is a 89y old  Female who presents with a chief complaint of SOB (17 Oct 2022 14:08)    Patient seen and examined at bedside.  pt with fever 100.6F yesterday, feels lethargic this am    ALLERGIES:  No Known Allergies        Vital Signs Last 24 Hrs  T(F): 98.1 (18 Oct 2022 05:38), Max: 100.6 (17 Oct 2022 20:45)  HR: 86 (18 Oct 2022 05:38) (86 - 102)  BP: 153/82 (18 Oct 2022 05:38) (133/76 - 153/82)  RR: 17 (18 Oct 2022 05:38) (17 - 18)  SpO2: 94% (18 Oct 2022 05:38) (88% - 94%)  I&O's Summary    17 Oct 2022 07:01  -  18 Oct 2022 07:00  --------------------------------------------------------  IN: 0 mL / OUT: 1100 mL / NET: -1100 mL      MEDICATIONS:  acetaminophen     Tablet .. 650 milliGRAM(s) Oral every 6 hours PRN  albuterol/ipratropium for Nebulization 3 milliLiter(s) Nebulizer every 6 hours  aluminum hydroxide/magnesium hydroxide/simethicone Suspension 30 milliLiter(s) Oral every 4 hours PRN  atorvastatin 80 milliGRAM(s) Oral at bedtime  cefTRIAXone   IVPB      cefTRIAXone   IVPB 1000 milliGRAM(s) IV Intermittent every 24 hours  cholecalciferol 1000 Unit(s) Oral daily  clopidogrel Tablet 75 milliGRAM(s) Oral daily  cyanocobalamin 1000 MICROGram(s) Oral daily  furosemide    Tablet 40 milliGRAM(s) Oral daily  heparin   Injectable 5000 Unit(s) SubCutaneous every 8 hours  levothyroxine 125 MICROGram(s) Oral daily  losartan 100 milliGRAM(s) Oral daily  melatonin 3 milliGRAM(s) Oral at bedtime PRN  metoprolol succinate ER 50 milliGRAM(s) Oral daily  mirtazapine 15 milliGRAM(s) Oral at bedtime  ondansetron Injectable 4 milliGRAM(s) IV Push every 8 hours PRN  pantoprazole    Tablet 40 milliGRAM(s) Oral before breakfast  polyethylene glycol 3350 17 Gram(s) Oral daily      PHYSICAL EXAM:  General: NAD, Alert but confused  ENT: MMM, no oral thrush  Neck: Supple, No JVD  Lungs: Crackles at bases, bilateral air entry, good inspiratory effort  Cardio: RRR, S1/S2, No murmurs  Abdomen: Soft, Nontender, Nondistended; Bowel sounds present  Extremities: No cyanosis, No edema    LABS:                        8.2    9.80  )-----------( 218      ( 18 Oct 2022 06:00 )             26.3     10-18    143  |  103  |  40  ----------------------------<  119  3.9   |  35  |  1.50    Ca    8.8      18 Oct 2022 06:00  Mg     1.7     10-18                                      COVID-19 PCR: [facility] (10-13-22 @ 12:20)      RADIOLOGY & ADDITIONAL TESTS:    Care Discussed with Consultants/Other Providers:    Patient is a 89y old  Female who presents with a chief complaint of SOB (17 Oct 2022 14:08)    Patient seen and examined at bedside.  pt with fever 100.6F yesterday, feels lethargic this am    ALLERGIES:  No Known Allergies        Vital Signs Last 24 Hrs  T(F): 98.1 (18 Oct 2022 05:38), Max: 100.6 (17 Oct 2022 20:45)  HR: 86 (18 Oct 2022 05:38) (86 - 102)  BP: 153/82 (18 Oct 2022 05:38) (133/76 - 153/82)  RR: 17 (18 Oct 2022 05:38) (17 - 18)  SpO2: 94% (18 Oct 2022 05:38) (88% - 94%)  I&O's Summary    17 Oct 2022 07:01  -  18 Oct 2022 07:00  --------------------------------------------------------  IN: 0 mL / OUT: 1100 mL / NET: -1100 mL      MEDICATIONS:  acetaminophen     Tablet .. 650 milliGRAM(s) Oral every 6 hours PRN  albuterol/ipratropium for Nebulization 3 milliLiter(s) Nebulizer every 6 hours  aluminum hydroxide/magnesium hydroxide/simethicone Suspension 30 milliLiter(s) Oral every 4 hours PRN  atorvastatin 80 milliGRAM(s) Oral at bedtime  cefTRIAXone   IVPB      cefTRIAXone   IVPB 1000 milliGRAM(s) IV Intermittent every 24 hours  cholecalciferol 1000 Unit(s) Oral daily  clopidogrel Tablet 75 milliGRAM(s) Oral daily  cyanocobalamin 1000 MICROGram(s) Oral daily  furosemide    Tablet 40 milliGRAM(s) Oral daily  heparin   Injectable 5000 Unit(s) SubCutaneous every 8 hours  levothyroxine 125 MICROGram(s) Oral daily  losartan 100 milliGRAM(s) Oral daily  melatonin 3 milliGRAM(s) Oral at bedtime PRN  metoprolol succinate ER 50 milliGRAM(s) Oral daily  mirtazapine 15 milliGRAM(s) Oral at bedtime  ondansetron Injectable 4 milliGRAM(s) IV Push every 8 hours PRN  pantoprazole    Tablet 40 milliGRAM(s) Oral before breakfast  polyethylene glycol 3350 17 Gram(s) Oral daily      PHYSICAL EXAM:  General: NAD, Alert but confused  ENT: MMM, no oral thrush  Neck: Supple, No JVD  Lungs: Crackles at bases, bilateral air entry, good inspiratory effort  Cardio: RRR, S1/S2, No murmurs  Abdomen: Soft, Nontender, Nondistended; Bowel sounds present  Extremities: No cyanosis, No edema    LABS:                        8.2    9.80  )-----------( 218      ( 18 Oct 2022 06:00 )             26.3     10-18    143  |  103  |  40  ----------------------------<  119  3.9   |  35  |  1.50    Ca    8.8      18 Oct 2022 06:00  Mg     1.7     10-18      COVID-19 PCR: [facility] (10-13-22 @ 12:20)

## 2022-10-18 NOTE — PROGRESS NOTE ADULT - ASSESSMENT
Assessment:  Candy Story is a 89 year old woman with past medical history of Coronary artery disease (s/p CABG), Hypertension and Type II Diabetes Mellitus presented from living facility with shortness of breath and leg swelling, found to have acute on chronic diastolic heart failure exacerbation,       Troponins negative x 4.     Lower extremity venous duplex negative for DVT.     Recommendations:    [] Of note, discussed with Dr. Elise and abnormal CT was reviewed by [facility] CT surgery and patient has sternal dehiscence, and no further investigation needed at this time.   Assessment:  Candy Story is a 89 year old woman with past medical history of Coronary artery disease (s/p CABG), Hypertension and Type II Diabetes Mellitus presented from living facility with shortness of breath and leg swelling, found to have acute on chronic diastolic heart failure exacerbation and anemia.     ECG consistent with sinus rhythm, possible old inferior infarct, RBBB and poor R wave progression, similar to prior ECG. Troponins negative x 2, does not appear to be a primary acute coronary syndrome. Echo report consistent with LVEF 60-65%, dilated RV with reduced RV systolic function, mild valvular regurgitation and mild pulmonary hypertension. CT chest consistent with sternal nonunion. Lower extremity venous duplex negative for DVT.     Recommendations:  [] Acute on chronic diastolic heart failure: Repeat pro BNP. Dose Lasix 60 mg IVP daily, monitor renal function and urine output. Recommend treatment of anemia as this may also contribute to dyspnea. Patient also with ventricular ectopy/VT on telemetry but asymptomatic, discussed with son/HCP at bedside that nuclear stress testing may be beneficial to evaluate for ischemia, but he would prefer conservative management, continue beta blocker and keep electrolytes repleted  [] CAD s/p CABG: Stable, continue antiplatelet, statin and beta blocker. Consider holding ACE-I if Cr rises  [] Of note, discussed with Dr. Elise and abnormal CT was reviewed by [facility] surgery and patient has sternal dehiscence, and no further investigation needed at this time.      We will continue to follow along.    Odalys Ball MD  Cardiology

## 2022-10-18 NOTE — PROGRESS NOTE ADULT - SUBJECTIVE AND OBJECTIVE BOX
ROXY RUSSELL  [id number]    Cardiology asked to re-evaluate patient due to concern for CHF    Chief Complaint: Acute on chronic diastolic heart failure/CAD s/p CABG    Interval History:    Tele:      Current meds:   acetaminophen     Tablet .. 650 milliGRAM(s) Oral every 6 hours PRN  albuterol/ipratropium for Nebulization 3 milliLiter(s) Nebulizer every 6 hours  aluminum hydroxide/magnesium hydroxide/simethicone Suspension 30 milliLiter(s) Oral every 4 hours PRN  atorvastatin 80 milliGRAM(s) Oral at bedtime  cefTRIAXone   IVPB      cefTRIAXone   IVPB 1000 milliGRAM(s) IV Intermittent every 24 hours  cholecalciferol 1000 Unit(s) Oral daily  clopidogrel Tablet 75 milliGRAM(s) Oral daily  cyanocobalamin 1000 MICROGram(s) Oral daily  heparin   Injectable 5000 Unit(s) SubCutaneous every 8 hours  levothyroxine 125 MICROGram(s) Oral daily  losartan 100 milliGRAM(s) Oral daily  melatonin 3 milliGRAM(s) Oral at bedtime PRN  metoprolol succinate ER 50 milliGRAM(s) Oral daily  mirtazapine 15 milliGRAM(s) Oral at bedtime  ondansetron Injectable 4 milliGRAM(s) IV Push every 8 hours PRN  pantoprazole    Tablet 40 milliGRAM(s) Oral before breakfast  polyethylene glycol 3350 17 Gram(s) Oral daily      Objective:     Vital Signs:   T(C): 36.7 (10-18-22 @ 12:30), Max: 38.1 (10-17-22 @ 20:45)  HR: 69 (10-18-22 @ 12:30) (69 - 95)  BP: 123/61 (10-18-22 @ 12:30) (123/61 - 153/82)  RR: 17 (10-18-22 @ 12:30) (17 - 18)  SpO2: 96% (10-18-22 @ 13:45) (92% - 96%)  Wt(kg): --    Physical Exam:   General: Comfortable in NAD  Neck: No JVD  CVS: nl s1s2, no s3  Pulm: CTA b/l  Abd: soft, non-tender  Ext: No c/c/e  Neuro A&O x3  Psych: Normal affect      Labs:   18 Oct 2022 06:00    143    |  103    |  40     ----------------------------<  119    3.9     |  35     |  1.50     Ca    8.8        18 Oct 2022 06:00  Mg     1.7       18 Oct 2022 06:00                            8.2    9.80  )-----------( 218      ( 18 Oct 2022 06:00 )             26.3             TTE (10/14/22):  LVEF 60-65%, mild LVH  Dilated RV with mildly reduced RV systolic function  Biatrial enlargement  Mild TR, Mild AR, Mild-moderate FL  Mild pulmonary hypertension       CT Chest (10/13/22):  Status post median sternotomy with sternal nonunion. The pulmonary artery trunk protrudes between the fragments of the sternum (2:56). Follow-up surgical consultation and/or CTA of the chest is recommended.  Small bibasilar atelectasis. No focal consolidation or pleural effusion.  Large hiatal hernia.  Additional findings as above.      ECG (10/13/22): sinus rhythm, cannot rule out old inferior infarct, PACs, RBBB, poor R wave progression (similar to prior ECG)    No prior cardiac workup in chart         ROXY RUSSELL  [id number]    Cardiology asked to re-evaluate patient due to concern for CHF    Chief Complaint: Acute on chronic diastolic heart failure/CAD s/p CABG/Anemia    Interval History: The patient reports feeling ok today, breathing is stable. Denies chest discomfort.     Tele: sinus rhythm 80s BPM, episode of VT      Current meds:   acetaminophen     Tablet .. 650 milliGRAM(s) Oral every 6 hours PRN  albuterol/ipratropium for Nebulization 3 milliLiter(s) Nebulizer every 6 hours  aluminum hydroxide/magnesium hydroxide/simethicone Suspension 30 milliLiter(s) Oral every 4 hours PRN  atorvastatin 80 milliGRAM(s) Oral at bedtime  cefTRIAXone   IVPB      cefTRIAXone   IVPB 1000 milliGRAM(s) IV Intermittent every 24 hours  cholecalciferol 1000 Unit(s) Oral daily  clopidogrel Tablet 75 milliGRAM(s) Oral daily  cyanocobalamin 1000 MICROGram(s) Oral daily  heparin   Injectable 5000 Unit(s) SubCutaneous every 8 hours  levothyroxine 125 MICROGram(s) Oral daily  losartan 100 milliGRAM(s) Oral daily  melatonin 3 milliGRAM(s) Oral at bedtime PRN  metoprolol succinate ER 50 milliGRAM(s) Oral daily  mirtazapine 15 milliGRAM(s) Oral at bedtime  ondansetron Injectable 4 milliGRAM(s) IV Push every 8 hours PRN  pantoprazole    Tablet 40 milliGRAM(s) Oral before breakfast  polyethylene glycol 3350 17 Gram(s) Oral daily      Objective:     Vital Signs:   T(C): 36.7 (10-18-22 @ 12:30), Max: 38.1 (10-17-22 @ 20:45)  HR: 69 (10-18-22 @ 12:30) (69 - 95)  BP: 123/61 (10-18-22 @ 12:30) (123/61 - 153/82)  RR: 17 (10-18-22 @ 12:30) (17 - 18)  SpO2: 96% (10-18-22 @ 13:45) (92% - 96%)  Wt(kg): --    Physical Exam:   General: elderly woman, no acute distress  Neck: supple  CVS: JVP ~ 9 cm H20, RRR, s1, s2  Pulm: unlabored respirations, crackles  Ext: no lower extremity edema b/l  Neuro: A&O x3  Psych: Normal affect      Labs:   18 Oct 2022 06:00    143    |  103    |  40     ----------------------------<  119    3.9     |  35     |  1.50     Ca    8.8        18 Oct 2022 06:00  Mg     1.7       18 Oct 2022 06:00                            8.2    9.80  )-----------( 218      ( 18 Oct 2022 06:00 )             26.3             TTE (10/14/22):  LVEF 60-65%, mild LVH  Dilated RV with mildly reduced RV systolic function  Biatrial enlargement  Mild TR, Mild AR, Mild-moderate NC  Mild pulmonary hypertension       CT Chest (10/13/22):  Status post median sternotomy with sternal nonunion. The pulmonary artery trunk protrudes between the fragments of the sternum (2:56). Follow-up surgical consultation and/or CTA of the chest is recommended.  Small bibasilar atelectasis. No focal consolidation or pleural effusion.  Large hiatal hernia.  Additional findings as above.      ECG (10/13/22): sinus rhythm, cannot rule out old inferior infarct, PACs, RBBB, poor R wave progression (similar to prior ECG)    No prior cardiac workup in chart

## 2022-10-19 LAB
ANION GAP SERPL CALC-SCNC: 7 MMOL/L — SIGNIFICANT CHANGE UP (ref 5–17)
BUN SERPL-MCNC: 54 MG/DL — HIGH (ref 7–23)
CALCIUM SERPL-MCNC: 8.5 MG/DL — SIGNIFICANT CHANGE UP (ref 8.4–10.5)
CHLORIDE SERPL-SCNC: 101 MMOL/L — SIGNIFICANT CHANGE UP (ref 96–108)
CO2 SERPL-SCNC: 33 MMOL/L — HIGH (ref 22–31)
CREAT SERPL-MCNC: 1.96 MG/DL — HIGH (ref 0.5–1.3)
EGFR: 24 ML/MIN/1.73M2 — LOW
GLUCOSE SERPL-MCNC: 152 MG/DL — HIGH (ref 70–99)
HCT VFR BLD CALC: 26.3 % — LOW (ref 34.5–45)
HGB BLD-MCNC: 8.1 G/DL — LOW (ref 11.5–15.5)
MAGNESIUM SERPL-MCNC: 2.5 MG/DL — SIGNIFICANT CHANGE UP (ref 1.6–2.6)
MCHC RBC-ENTMCNC: 27.6 PG — SIGNIFICANT CHANGE UP (ref 27–34)
MCHC RBC-ENTMCNC: 30.8 GM/DL — LOW (ref 32–36)
MCV RBC AUTO: 89.5 FL — SIGNIFICANT CHANGE UP (ref 80–100)
NRBC # BLD: 0 /100 WBCS — SIGNIFICANT CHANGE UP (ref 0–0)
NT-PROBNP SERPL-SCNC: 3181 PG/ML — HIGH (ref 0–300)
PLATELET # BLD AUTO: 251 K/UL — SIGNIFICANT CHANGE UP (ref 150–400)
POTASSIUM SERPL-MCNC: 4.3 MMOL/L — SIGNIFICANT CHANGE UP (ref 3.5–5.3)
POTASSIUM SERPL-SCNC: 4.3 MMOL/L — SIGNIFICANT CHANGE UP (ref 3.5–5.3)
RBC # BLD: 2.94 M/UL — LOW (ref 3.8–5.2)
RBC # FLD: 16.5 % — HIGH (ref 10.3–14.5)
SODIUM SERPL-SCNC: 141 MMOL/L — SIGNIFICANT CHANGE UP (ref 135–145)
WBC # BLD: 8.47 K/UL — SIGNIFICANT CHANGE UP (ref 3.8–10.5)
WBC # FLD AUTO: 8.47 K/UL — SIGNIFICANT CHANGE UP (ref 3.8–10.5)

## 2022-10-19 PROCEDURE — 99233 SBSQ HOSP IP/OBS HIGH 50: CPT

## 2022-10-19 PROCEDURE — 76775 US EXAM ABDO BACK WALL LIM: CPT | Mod: 26

## 2022-10-19 PROCEDURE — 99232 SBSQ HOSP IP/OBS MODERATE 35: CPT

## 2022-10-19 PROCEDURE — 71045 X-RAY EXAM CHEST 1 VIEW: CPT | Mod: 26

## 2022-10-19 RX ORDER — METOPROLOL TARTRATE 50 MG
75 TABLET ORAL DAILY
Refills: 0 | Status: DISCONTINUED | OUTPATIENT
Start: 2022-10-20 | End: 2022-10-22

## 2022-10-19 RX ADMIN — CLOPIDOGREL BISULFATE 75 MILLIGRAM(S): 75 TABLET, FILM COATED ORAL at 11:58

## 2022-10-19 RX ADMIN — Medication 125 MICROGRAM(S): at 05:04

## 2022-10-19 RX ADMIN — Medication 650 MILLIGRAM(S): at 04:38

## 2022-10-19 RX ADMIN — LOSARTAN POTASSIUM 100 MILLIGRAM(S): 100 TABLET, FILM COATED ORAL at 05:04

## 2022-10-19 RX ADMIN — Medication 3 MILLILITER(S): at 04:57

## 2022-10-19 RX ADMIN — HEPARIN SODIUM 5000 UNIT(S): 5000 INJECTION INTRAVENOUS; SUBCUTANEOUS at 21:30

## 2022-10-19 RX ADMIN — ATORVASTATIN CALCIUM 80 MILLIGRAM(S): 80 TABLET, FILM COATED ORAL at 21:30

## 2022-10-19 RX ADMIN — Medication 3 MILLILITER(S): at 08:52

## 2022-10-19 RX ADMIN — Medication 325 MILLIGRAM(S): at 11:58

## 2022-10-19 RX ADMIN — PANTOPRAZOLE SODIUM 40 MILLIGRAM(S): 20 TABLET, DELAYED RELEASE ORAL at 05:03

## 2022-10-19 RX ADMIN — Medication 50 MILLIGRAM(S): at 05:04

## 2022-10-19 RX ADMIN — HEPARIN SODIUM 5000 UNIT(S): 5000 INJECTION INTRAVENOUS; SUBCUTANEOUS at 05:03

## 2022-10-19 RX ADMIN — Medication 325 MILLIGRAM(S): at 17:30

## 2022-10-19 RX ADMIN — CEFTRIAXONE 100 MILLIGRAM(S): 500 INJECTION, POWDER, FOR SOLUTION INTRAMUSCULAR; INTRAVENOUS at 07:02

## 2022-10-19 RX ADMIN — PREGABALIN 1000 MICROGRAM(S): 225 CAPSULE ORAL at 11:58

## 2022-10-19 RX ADMIN — Medication 3 MILLILITER(S): at 22:18

## 2022-10-19 RX ADMIN — POLYETHYLENE GLYCOL 3350 17 GRAM(S): 17 POWDER, FOR SOLUTION ORAL at 11:58

## 2022-10-19 RX ADMIN — Medication 500 MILLIGRAM(S): at 11:57

## 2022-10-19 RX ADMIN — Medication 1000 UNIT(S): at 11:57

## 2022-10-19 RX ADMIN — Medication 60 MILLIGRAM(S): at 05:03

## 2022-10-19 RX ADMIN — HEPARIN SODIUM 5000 UNIT(S): 5000 INJECTION INTRAVENOUS; SUBCUTANEOUS at 13:31

## 2022-10-19 RX ADMIN — Medication 3 MILLILITER(S): at 15:49

## 2022-10-19 RX ADMIN — Medication 650 MILLIGRAM(S): at 04:08

## 2022-10-19 RX ADMIN — MIRTAZAPINE 15 MILLIGRAM(S): 45 TABLET, ORALLY DISINTEGRATING ORAL at 21:30

## 2022-10-19 NOTE — PROGRESS NOTE ADULT - ASSESSMENT
Assessment:  Candy Story is a 89 year old woman with past medical history of Coronary artery disease (s/p CABG), Hypertension and Type II Diabetes Mellitus presented from living facility with shortness of breath and leg swelling, found to have acute on chronic diastolic heart failure exacerbation and anemia.     ECG consistent with sinus rhythm, possible old inferior infarct, RBBB and poor R wave progression, similar to prior ECG. Troponins negative x 2, does not appear to be a primary acute coronary syndrome. Echo report consistent with LVEF 60-65%, dilated RV with reduced RV systolic function, mild valvular regurgitation and mild pulmonary hypertension. CT chest consistent with sternal nonunion. Lower extremity venous duplex negative for DVT.     Recommendations:  [] Acute on chronic diastolic heart failure: Pro BNP repeated and improved, Cr rising after additional IV lasix and likely patient is euvolemic at this time, would transition to Lasix 40 mg PO daily.   Recommend treatment of anemia as this may also contribute to dyspnea. Patient also with ventricular ectopy/VT on telemetry but asymptomatic, discussed with son/HCP at bedside that nuclear stress testing may be beneficial to evaluate for ischemia, but he would prefer conservative management, increase metoprolol succinate to 75 mg daily, continue to keep electrolytes repleted  [] CAD s/p CABG: Stable, continue antiplatelet, statin and beta blocker. If Cr continues to rise then hold ARB  [] Of note, discussed with Dr. Elise and abnormal CT was reviewed by [facility] CT surgery and patient has sternal dehiscence, and no further investigation needed at this time.      Will sign out to cardiologist to follow along tomorrow.     Odalys Ball MD  Cardiology

## 2022-10-19 NOTE — PROGRESS NOTE ADULT - SUBJECTIVE AND OBJECTIVE BOX
ROXY RUSSELL  [id number]        Chief Complaint: Acute on chronic diastolic heart failure/CAD s/p CABG/Anemia    Interval History: The patient reports feeling ok today, breathing is stable.     Tele: sinus rhythm 80s BPM, episodes of NSVT      Current meds:   acetaminophen     Tablet .. 650 milliGRAM(s) Oral every 6 hours PRN  albuterol/ipratropium for Nebulization 3 milliLiter(s) Nebulizer every 6 hours  aluminum hydroxide/magnesium hydroxide/simethicone Suspension 30 milliLiter(s) Oral every 4 hours PRN  ascorbic acid 500 milliGRAM(s) Oral daily  atorvastatin 80 milliGRAM(s) Oral at bedtime  cholecalciferol 1000 Unit(s) Oral daily  clopidogrel Tablet 75 milliGRAM(s) Oral daily  cyanocobalamin 1000 MICROGram(s) Oral daily  ferrous sulfate Oral Tab/Cap - Peds 325 milliGRAM(s) Oral two times a day with meals  furosemide   Injectable 60 milliGRAM(s) IV Push daily  heparin   Injectable 5000 Unit(s) SubCutaneous every 8 hours  levothyroxine 125 MICROGram(s) Oral daily  losartan 100 milliGRAM(s) Oral daily  melatonin 3 milliGRAM(s) Oral at bedtime PRN  metoprolol succinate ER 50 milliGRAM(s) Oral daily  mirtazapine 15 milliGRAM(s) Oral at bedtime  ondansetron Injectable 4 milliGRAM(s) IV Push every 8 hours PRN  pantoprazole    Tablet 40 milliGRAM(s) Oral before breakfast  polyethylene glycol 3350 17 Gram(s) Oral daily      Objective:     Vital Signs:   T(C): 36.8 (10-19-22 @ 04:55), Max: 36.8 (10-19-22 @ 04:55)  HR: 75 (10-19-22 @ 04:57) (69 - 76)  BP: 145/65 (10-19-22 @ 04:55) (123/61 - 145/65)  RR: 20 (10-19-22 @ 04:55) (17 - 20)  SpO2: 97% (10-19-22 @ 04:57) (92% - 97%)  Wt(kg): --    Physical Exam:   General: elderly woman, no acute distress  Neck: supple  CVS: JVP ~ 9 cm H20, RRR, s1, s2  Pulm: unlabored respirations, decreased breath sounds  Ext: no lower extremity edema b/l  Neuro: A&O x3  Psych: Normal affect      Labs:   19 Oct 2022 07:00    141    |  101    |  54     ----------------------------<  152    4.3     |  33     |  1.96     Ca    8.5        19 Oct 2022 07:00  Mg     2.5       19 Oct 2022 07:00                            8.1    8.47  )-----------( 251      ( 19 Oct 2022 07:00 )             26.3                 TTE (10/14/22):  LVEF 60-65%, mild LVH  Dilated RV with mildly reduced RV systolic function  Biatrial enlargement  Mild TR, Mild AR, Mild-moderate MD  Mild pulmonary hypertension       CT Chest (10/13/22):  Status post median sternotomy with sternal nonunion. The pulmonary artery trunk protrudes between the fragments of the sternum (2:56). Follow-up surgical consultation and/or CTA of the chest is recommended.  Small bibasilar atelectasis. No focal consolidation or pleural effusion.  Large hiatal hernia.  Additional findings as above.      ECG (10/13/22): sinus rhythm, cannot rule out old inferior infarct, PACs, RBBB, poor R wave progression (similar to prior ECG)    No prior cardiac workup in chart

## 2022-10-19 NOTE — CHART NOTE - NSCHARTNOTEFT_GEN_A_CORE
Nutrition Follow Up Note  Hospital Course (Per Electronic Medical Record):   Source: Medical Record [X] Patient [X]  Nursing Staff [X]     Diet: DASH/TLC     Patient tolerating  diet po intake appears to vary from 50-75% . no GI issues. Patient with dx of moderate protein calorie malnutrition     Current Weight: (10/19) 122.5/55.6kg                         (10/18) 122.5/55.6kg                         (10/16) 125.2/56.8kg    Pertinent Medications: MEDICATIONS  (STANDING):  albuterol/ipratropium for Nebulization 3 milliLiter(s) Nebulizer every 6 hours  ascorbic acid 500 milliGRAM(s) Oral daily  atorvastatin 80 milliGRAM(s) Oral at bedtime  cholecalciferol 1000 Unit(s) Oral daily  clopidogrel Tablet 75 milliGRAM(s) Oral daily  cyanocobalamin 1000 MICROGram(s) Oral daily  ferrous sulfate Oral Tab/Cap - Peds 325 milliGRAM(s) Oral two times a day with meals  furosemide   Injectable 60 milliGRAM(s) IV Push daily  heparin   Injectable 5000 Unit(s) SubCutaneous every 8 hours  levothyroxine 125 MICROGram(s) Oral daily  losartan 100 milliGRAM(s) Oral daily  metoprolol succinate ER 50 milliGRAM(s) Oral daily  mirtazapine 15 milliGRAM(s) Oral at bedtime  pantoprazole    Tablet 40 milliGRAM(s) Oral before breakfast  polyethylene glycol 3350 17 Gram(s) Oral daily    MEDICATIONS  (PRN):  acetaminophen     Tablet .. 650 milliGRAM(s) Oral every 6 hours PRN Temp greater or equal to 38C (100.4F), Mild Pain (1 - 3)  aluminum hydroxide/magnesium hydroxide/simethicone Suspension 30 milliLiter(s) Oral every 4 hours PRN Dyspepsia  melatonin 3 milliGRAM(s) Oral at bedtime PRN Insomnia  ondansetron Injectable 4 milliGRAM(s) IV Push every 8 hours PRN Nausea and/or Vomiting      Pertinent Labs:  10-19 Na141 mmol/L Glu 152 mg/dL<H> K+ 4.3 mmol/L Cr  1.96 mg/dL<H> BUN 54 mg/dL<H> 10-14 Alb 2.7 g/dL<L>        Skin: intact    Edema: none    Last BM: (10/15)     Estimated Needs:   [X] No Change since Previous Assessment      Previous Nutrition Diagnosis: Moderate Malnutrition     Nutrition Diagnosis is [X] Ongoing       New Nutrition Diagnosis: [X] Not Applicable    Interventions:   1. continue current diet       Monitoring & Evaluation: will monitor:  [X] Weights   [X] PO Intake   [X] Follow Up (Per Protocol)  [X] Tolerance to Diet Prescription       RD to follow as per Nutrition protocol  Yesenia Lucas RDN Nutrition Follow Up Note  Hospital Course (Per Electronic Medical Record):   Source: Medical Record [X] Patient [X]  Nursing Staff [X]     Diet: DASH/TLC     Patient tolerating  diet po intake appears to vary from 50-75% . no GI issues. Patient with dx of moderate protein calorie malnutrition, patient not receptive tp po nutrition supplements , hx of DM noted , no POCT noted , home meds reviewed no diabetic management  noted    Current Weight: (10/19) 122.5/55.6kg                         (10/18) 122.5/55.6kg                         (10/16) 125.2/56.8kg    Pertinent Medications: MEDICATIONS  (STANDING):  albuterol/ipratropium for Nebulization 3 milliLiter(s) Nebulizer every 6 hours  ascorbic acid 500 milliGRAM(s) Oral daily  atorvastatin 80 milliGRAM(s) Oral at bedtime  cholecalciferol 1000 Unit(s) Oral daily  clopidogrel Tablet 75 milliGRAM(s) Oral daily  cyanocobalamin 1000 MICROGram(s) Oral daily  ferrous sulfate Oral Tab/Cap - Peds 325 milliGRAM(s) Oral two times a day with meals  furosemide   Injectable 60 milliGRAM(s) IV Push daily  heparin   Injectable 5000 Unit(s) SubCutaneous every 8 hours  levothyroxine 125 MICROGram(s) Oral daily  losartan 100 milliGRAM(s) Oral daily  metoprolol succinate ER 50 milliGRAM(s) Oral daily  mirtazapine 15 milliGRAM(s) Oral at bedtime  pantoprazole    Tablet 40 milliGRAM(s) Oral before breakfast  polyethylene glycol 3350 17 Gram(s) Oral daily    MEDICATIONS  (PRN):  acetaminophen     Tablet .. 650 milliGRAM(s) Oral every 6 hours PRN Temp greater or equal to 38C (100.4F), Mild Pain (1 - 3)  aluminum hydroxide/magnesium hydroxide/simethicone Suspension 30 milliLiter(s) Oral every 4 hours PRN Dyspepsia  melatonin 3 milliGRAM(s) Oral at bedtime PRN Insomnia  ondansetron Injectable 4 milliGRAM(s) IV Push every 8 hours PRN Nausea and/or Vomiting      Pertinent Labs:  10-19 Na141 mmol/L Glu 152 mg/dL<H> K+ 4.3 mmol/L Cr  1.96 mg/dL<H> BUN 54 mg/dL<H> 10-14 Alb 2.7 g/dL<L>        Skin: intact    Edema: none    Last BM: (10/15)     Estimated Needs:   [X] No Change since Previous Assessment      Previous Nutrition Diagnosis: Moderate Malnutrition     Nutrition Diagnosis is [X] Ongoing       New Nutrition Diagnosis: [X] Not Applicable    Interventions:   1. continue current diet       Monitoring & Evaluation: will monitor:  [X] Weights   [X] PO Intake   [X] Follow Up (Per Protocol)  [X] Tolerance to Diet Prescription       RD to follow as per Nutrition protocol  Yesenia Lucas RDN
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
## 2022-10-19 NOTE — PROGRESS NOTE ADULT - SUBJECTIVE AND OBJECTIVE BOX
Patient is a 89y old  Female who presents with a chief complaint of SOB (19 Oct 2022 09:45)    Patient seen and examined at bedside.  no acute events overnight, pt reports feeling a little better today    ALLERGIES:  No Known Allergies        Vital Signs Last 24 Hrs  T(F): 98.2 (19 Oct 2022 04:55), Max: 98.2 (19 Oct 2022 04:55)  HR: 75 (19 Oct 2022 04:57) (69 - 76)  BP: 145/65 (19 Oct 2022 04:55) (123/61 - 145/65)  RR: 20 (19 Oct 2022 04:55) (17 - 20)  SpO2: 97% (19 Oct 2022 04:57) (92% - 97%)  I&O's Summary    18 Oct 2022 07:01  -  19 Oct 2022 07:00  --------------------------------------------------------  IN: 120 mL / OUT: 300 mL / NET: -180 mL      MEDICATIONS:  acetaminophen     Tablet .. 650 milliGRAM(s) Oral every 6 hours PRN  albuterol/ipratropium for Nebulization 3 milliLiter(s) Nebulizer every 6 hours  aluminum hydroxide/magnesium hydroxide/simethicone Suspension 30 milliLiter(s) Oral every 4 hours PRN  ascorbic acid 500 milliGRAM(s) Oral daily  atorvastatin 80 milliGRAM(s) Oral at bedtime  cholecalciferol 1000 Unit(s) Oral daily  clopidogrel Tablet 75 milliGRAM(s) Oral daily  cyanocobalamin 1000 MICROGram(s) Oral daily  ferrous sulfate Oral Tab/Cap - Peds 325 milliGRAM(s) Oral two times a day with meals  furosemide   Injectable 60 milliGRAM(s) IV Push daily  heparin   Injectable 5000 Unit(s) SubCutaneous every 8 hours  levothyroxine 125 MICROGram(s) Oral daily  losartan 100 milliGRAM(s) Oral daily  melatonin 3 milliGRAM(s) Oral at bedtime PRN  metoprolol succinate ER 50 milliGRAM(s) Oral daily  mirtazapine 15 milliGRAM(s) Oral at bedtime  ondansetron Injectable 4 milliGRAM(s) IV Push every 8 hours PRN  pantoprazole    Tablet 40 milliGRAM(s) Oral before breakfast  polyethylene glycol 3350 17 Gram(s) Oral daily      PHYSICAL EXAM:  General: NAD, Alert elderly female  ENT: MMM, no oral thrush  Neck: Supple, No JVD  Lungs: Clear to auscultation bilaterally, non labored, bilateral air entry  Cardio: RRR, S1/S2, No murmurs  Abdomen: Soft, Nontender, Nondistended; Bowel sounds present  Extremities: No cyanosis, No edema    LABS:                        8.1    8.47  )-----------( 251      ( 19 Oct 2022 07:00 )             26.3     10-19    141  |  101  |  54  ----------------------------<  152  4.3   |  33  |  1.96    Ca    8.5      19 Oct 2022 07:00  Mg     2.5     10-19                                      COVID-19 PCR: NotDetec (10-13-22 @ 12:20)      RADIOLOGY & ADDITIONAL TESTS:    Care Discussed with Consultants/Other Providers:    Patient is a 89y old  Female who presents with a chief complaint of SOB (19 Oct 2022 09:45)    Patient seen and examined at bedside.  no acute events overnight, pt reports feeling a little better today    ALLERGIES:  No Known Allergies        Vital Signs Last 24 Hrs  T(F): 98.2 (19 Oct 2022 04:55), Max: 98.2 (19 Oct 2022 04:55)  HR: 75 (19 Oct 2022 04:57) (69 - 76)  BP: 145/65 (19 Oct 2022 04:55) (123/61 - 145/65)  RR: 20 (19 Oct 2022 04:55) (17 - 20)  SpO2: 97% (19 Oct 2022 04:57) (92% - 97%)  I&O's Summary    18 Oct 2022 07:01  -  19 Oct 2022 07:00  --------------------------------------------------------  IN: 120 mL / OUT: 300 mL / NET: -180 mL      MEDICATIONS:  acetaminophen     Tablet .. 650 milliGRAM(s) Oral every 6 hours PRN  albuterol/ipratropium for Nebulization 3 milliLiter(s) Nebulizer every 6 hours  aluminum hydroxide/magnesium hydroxide/simethicone Suspension 30 milliLiter(s) Oral every 4 hours PRN  ascorbic acid 500 milliGRAM(s) Oral daily  atorvastatin 80 milliGRAM(s) Oral at bedtime  cholecalciferol 1000 Unit(s) Oral daily  clopidogrel Tablet 75 milliGRAM(s) Oral daily  cyanocobalamin 1000 MICROGram(s) Oral daily  ferrous sulfate Oral Tab/Cap - Peds 325 milliGRAM(s) Oral two times a day with meals  furosemide   Injectable 60 milliGRAM(s) IV Push daily  heparin   Injectable 5000 Unit(s) SubCutaneous every 8 hours  levothyroxine 125 MICROGram(s) Oral daily  losartan 100 milliGRAM(s) Oral daily  melatonin 3 milliGRAM(s) Oral at bedtime PRN  metoprolol succinate ER 50 milliGRAM(s) Oral daily  mirtazapine 15 milliGRAM(s) Oral at bedtime  ondansetron Injectable 4 milliGRAM(s) IV Push every 8 hours PRN  pantoprazole    Tablet 40 milliGRAM(s) Oral before breakfast  polyethylene glycol 3350 17 Gram(s) Oral daily      PHYSICAL EXAM:  General: NAD, Alert elderly female  ENT: MMM, no oral thrush  Neck: Supple, No JVD  Lungs: Clear to auscultation bilaterally, non labored, bilateral air entry  Cardio: RRR, S1/S2, No murmurs  Abdomen: Soft, Nontender, Nondistended; Bowel sounds present  Extremities: No cyanosis, No edema    LABS:                        8.1    8.47  )-----------( 251      ( 19 Oct 2022 07:00 )             26.3     10-19    141  |  101  |  54  ----------------------------<  152  4.3   |  33  |  1.96    Ca    8.5      19 Oct 2022 07:00  Mg     2.5     10-19    COVID-19 PCR: NotDetec (10-13-22 @ 12:20)

## 2022-10-19 NOTE — PROGRESS NOTE ADULT - ASSESSMENT
89 year old F PMH CAD s/p CABG x3 (5/2010), DM type 2, HTN, HLD presenting to the ED from [facility] for SOB and bilateral LE edema admitted for SOB    # Acute Hypoxic Respiratory Failure due to probable Acute/chronic Diastolic CHF  TTE showed preserved EF, grade 2 DD, mild LVH, mod enlarged RV  CXR on 10/23 showed cardiomegaly w/o gross airspace consolidation or effusion, BNP 8680  maintain supplemental oxygen, wean as tolerated (pt is not on home oxygen)  Pro BNP downtrending, repeat CXR with scant effusions, inc SCr after IV lasix  pt likely euvolemic, transition to lasix 40 po daily, will get renal evaluation  strict I/Os, daily weights  continue duonebs, incentive spirometer  stopped steroid taper because does not have COPD  PT recommends [facility]    # E Coli UTI  # Fever  pt completed course of IV Rocephin for UTI, no Fever  monitor labs    # Anemia with Iron Deficiency  s/p iron x 2 doses  FOBT negative  monitor CBC  transfuse Hgb < 8 or is symptomatic    # CAD s/p CABG  # HTN  continue home plavix, atorvastatin  continue losartan, increase metoprolol xl 75 mg daily as per cardiology  if SCr continues to rise hold ARB  NST may be beneficial to evaluate for ischemia, but pt and family prefer conservative mngmt    # Hypothyroid  continue levothyroxine 125 mcg daily    # CKD stage 3  renal indices at or near baseline  avoid nephrotoxins  monitor     # DVT PPX  heparin    # Disposition  anticipate dc [facility] 24-48 hours pending cardiology and renal clearance      updated son Khris [phone] 89 year old F PMH CAD s/p CABG x3 (5/2010), DM type 2, HTN, HLD presenting to the ED from [facility] for SOB and bilateral LE edema admitted for SOB    # Acute Hypoxic Respiratory Failure due to probable Acute/chronic Diastolic CHF  TTE showed preserved EF, grade 2 DD, mild LVH, mod enlarged RV  CXR on 10/23 showed cardiomegaly w/o gross airspace consolidation or effusion, BNP 8680  maintain supplemental oxygen, wean as tolerated (pt is not on home oxygen)  Pro BNP downtrending, repeat CXR with scant effusions, inc SCr after IV lasix  pt likely euvolemic, transition to lasix 40 po daily, will get renal evaluation  strict I/Os, daily weights  continue duonebs, incentive spirometer  stopped steroid taper because does not have COPD  PT recommends [facility]    # E Coli UTI  # Fever  pt completed course of IV Rocephin for UTI, no Fever  monitor labs    # Anemia with Iron Deficiency  s/p iron x 2 doses  FOBT negative  monitor CBC  transfuse Hgb < 8 or is symptomatic    # CAD s/p CABG  # HTN  continue home plavix, atorvastatin  continue losartan, increase metoprolol xl 75 mg daily as per cardiology  if SCr continues to rise hold ARB  NST may be beneficial to evaluate for ischemia, but pt and family prefer conservative mngmt    # Hypothyroid  continue levothyroxine 125 mcg daily    # CKD stage 3  renal indices at or near baseline  avoid nephrotoxins  monitor     # DVT PPX  heparin    # Disposition  anticipate dc [facility] 24-48 hours pending cardiology and renal clearance  updated son Khris [phone]

## 2022-10-20 LAB
ANION GAP SERPL CALC-SCNC: 7 MMOL/L — SIGNIFICANT CHANGE UP (ref 5–17)
BUN SERPL-MCNC: 56 MG/DL — HIGH (ref 7–23)
CALCIUM SERPL-MCNC: 9 MG/DL — SIGNIFICANT CHANGE UP (ref 8.4–10.5)
CHLORIDE SERPL-SCNC: 101 MMOL/L — SIGNIFICANT CHANGE UP (ref 96–108)
CO2 SERPL-SCNC: 31 MMOL/L — SIGNIFICANT CHANGE UP (ref 22–31)
CREAT SERPL-MCNC: 2.04 MG/DL — HIGH (ref 0.5–1.3)
EGFR: 22 ML/MIN/1.73M2 — LOW
GLUCOSE SERPL-MCNC: 122 MG/DL — HIGH (ref 70–99)
HCT VFR BLD CALC: 31.3 % — LOW (ref 34.5–45)
HGB BLD-MCNC: 9.4 G/DL — LOW (ref 11.5–15.5)
MAGNESIUM SERPL-MCNC: 2.4 MG/DL — SIGNIFICANT CHANGE UP (ref 1.6–2.6)
MCHC RBC-ENTMCNC: 27.6 PG — SIGNIFICANT CHANGE UP (ref 27–34)
MCHC RBC-ENTMCNC: 30 GM/DL — LOW (ref 32–36)
MCV RBC AUTO: 91.8 FL — SIGNIFICANT CHANGE UP (ref 80–100)
NRBC # BLD: 0 /100 WBCS — SIGNIFICANT CHANGE UP (ref 0–0)
PLATELET # BLD AUTO: 325 K/UL — SIGNIFICANT CHANGE UP (ref 150–400)
POTASSIUM SERPL-MCNC: 4.7 MMOL/L — SIGNIFICANT CHANGE UP (ref 3.5–5.3)
POTASSIUM SERPL-SCNC: 4.7 MMOL/L — SIGNIFICANT CHANGE UP (ref 3.5–5.3)
RBC # BLD: 3.41 M/UL — LOW (ref 3.8–5.2)
RBC # FLD: 16.5 % — HIGH (ref 10.3–14.5)
SARS-COV-2 RNA SPEC QL NAA+PROBE: SIGNIFICANT CHANGE UP
SODIUM SERPL-SCNC: 139 MMOL/L — SIGNIFICANT CHANGE UP (ref 135–145)
URATE SERPL-MCNC: 8.1 MG/DL — HIGH (ref 2.5–7)
WBC # BLD: 8.88 K/UL — SIGNIFICANT CHANGE UP (ref 3.8–10.5)
WBC # FLD AUTO: 8.88 K/UL — SIGNIFICANT CHANGE UP (ref 3.8–10.5)

## 2022-10-20 PROCEDURE — 99232 SBSQ HOSP IP/OBS MODERATE 35: CPT

## 2022-10-20 RX ADMIN — POLYETHYLENE GLYCOL 3350 17 GRAM(S): 17 POWDER, FOR SOLUTION ORAL at 14:34

## 2022-10-20 RX ADMIN — Medication 325 MILLIGRAM(S): at 10:12

## 2022-10-20 RX ADMIN — Medication 125 MICROGRAM(S): at 05:50

## 2022-10-20 RX ADMIN — PANTOPRAZOLE SODIUM 40 MILLIGRAM(S): 20 TABLET, DELAYED RELEASE ORAL at 05:49

## 2022-10-20 RX ADMIN — PREGABALIN 1000 MICROGRAM(S): 225 CAPSULE ORAL at 11:58

## 2022-10-20 RX ADMIN — CLOPIDOGREL BISULFATE 75 MILLIGRAM(S): 75 TABLET, FILM COATED ORAL at 11:58

## 2022-10-20 RX ADMIN — HEPARIN SODIUM 5000 UNIT(S): 5000 INJECTION INTRAVENOUS; SUBCUTANEOUS at 14:34

## 2022-10-20 RX ADMIN — HEPARIN SODIUM 5000 UNIT(S): 5000 INJECTION INTRAVENOUS; SUBCUTANEOUS at 05:50

## 2022-10-20 RX ADMIN — Medication 1000 UNIT(S): at 11:58

## 2022-10-20 RX ADMIN — Medication 75 MILLIGRAM(S): at 05:50

## 2022-10-20 RX ADMIN — HEPARIN SODIUM 5000 UNIT(S): 5000 INJECTION INTRAVENOUS; SUBCUTANEOUS at 21:43

## 2022-10-20 RX ADMIN — MIRTAZAPINE 15 MILLIGRAM(S): 45 TABLET, ORALLY DISINTEGRATING ORAL at 21:43

## 2022-10-20 RX ADMIN — Medication 325 MILLIGRAM(S): at 17:59

## 2022-10-20 RX ADMIN — Medication 3 MILLILITER(S): at 15:26

## 2022-10-20 RX ADMIN — ATORVASTATIN CALCIUM 80 MILLIGRAM(S): 80 TABLET, FILM COATED ORAL at 21:43

## 2022-10-20 RX ADMIN — Medication 3 MILLILITER(S): at 21:18

## 2022-10-20 NOTE — PROGRESS NOTE ADULT - SUBJECTIVE AND OBJECTIVE BOX
Patient is a 89y old  Female who presents with a chief complaint of SOB (20 Oct 2022 11:36)    Patient seen and examined at bedside.  no acute events overnight    ALLERGIES:  No Known Allergies        Vital Signs Last 24 Hrs  T(F): 98.5 (20 Oct 2022 05:44), Max: 98.5 (20 Oct 2022 05:44)  HR: 84 (20 Oct 2022 05:44) (84 - 93)  BP: 157/83 (20 Oct 2022 05:44) (140/65 - 157/83)  RR: 14 (20 Oct 2022 05:44) (14 - 18)  SpO2: 94% (20 Oct 2022 05:44) (93% - 97%)  I&O's Summary    19 Oct 2022 07:01  -  20 Oct 2022 07:00  --------------------------------------------------------  IN: 240 mL / OUT: 700 mL / NET: -460 mL    20 Oct 2022 07:01  -  20 Oct 2022 12:12  --------------------------------------------------------  IN: 240 mL / OUT: 0 mL / NET: 240 mL      MEDICATIONS:  acetaminophen     Tablet .. 650 milliGRAM(s) Oral every 6 hours PRN  albuterol/ipratropium for Nebulization 3 milliLiter(s) Nebulizer every 6 hours  aluminum hydroxide/magnesium hydroxide/simethicone Suspension 30 milliLiter(s) Oral every 4 hours PRN  atorvastatin 80 milliGRAM(s) Oral at bedtime  cholecalciferol 1000 Unit(s) Oral daily  clopidogrel Tablet 75 milliGRAM(s) Oral daily  cyanocobalamin 1000 MICROGram(s) Oral daily  ferrous sulfate Oral Tab/Cap - Peds 325 milliGRAM(s) Oral two times a day with meals  heparin   Injectable 5000 Unit(s) SubCutaneous every 8 hours  levothyroxine 125 MICROGram(s) Oral daily  melatonin 3 milliGRAM(s) Oral at bedtime PRN  metoprolol succinate ER 75 milliGRAM(s) Oral daily  mirtazapine 15 milliGRAM(s) Oral at bedtime  ondansetron Injectable 4 milliGRAM(s) IV Push every 8 hours PRN  pantoprazole    Tablet 40 milliGRAM(s) Oral before breakfast  polyethylene glycol 3350 17 Gram(s) Oral daily      PHYSICAL EXAM:  General: NAD, Alert elderly female  ENT: MMM, no oral thrush  Neck: Supple, No JVD  Lungs: Clear to auscultation bilaterally, non labored, bilateral air entry  Cardio: RRR, S1/S2, No murmurs  Abdomen: Soft, Nontender, Nondistended; Bowel sounds present  Extremities: No cyanosis, No edema    LABS:                        9.4    8.88  )-----------( 325      ( 20 Oct 2022 07:37 )             31.3     10-20    139  |  101  |  56  ----------------------------<  122  4.7   |  31  |  2.04    Ca    9.0      20 Oct 2022 07:37  Mg     2.4     10-20                                      COVID-19 PCR: NotDetec (10-20-22 @ 06:23)  COVID-19 PCR: NotDetec (10-13-22 @ 12:20)      RADIOLOGY & ADDITIONAL TESTS:    Care Discussed with Consultants/Other Providers:    Patient is a 89y old  Female who presents with a chief complaint of SOB (20 Oct 2022 11:36)    Patient seen and examined at bedside.  no acute events overnight    ALLERGIES:  No Known Allergies        Vital Signs Last 24 Hrs  T(F): 98.5 (20 Oct 2022 05:44), Max: 98.5 (20 Oct 2022 05:44)  HR: 84 (20 Oct 2022 05:44) (84 - 93)  BP: 157/83 (20 Oct 2022 05:44) (140/65 - 157/83)  RR: 14 (20 Oct 2022 05:44) (14 - 18)  SpO2: 94% (20 Oct 2022 05:44) (93% - 97%)  I&O's Summary    19 Oct 2022 07:01  -  20 Oct 2022 07:00  --------------------------------------------------------  IN: 240 mL / OUT: 700 mL / NET: -460 mL    20 Oct 2022 07:01  -  20 Oct 2022 12:12  --------------------------------------------------------  IN: 240 mL / OUT: 0 mL / NET: 240 mL      MEDICATIONS:  acetaminophen     Tablet .. 650 milliGRAM(s) Oral every 6 hours PRN  albuterol/ipratropium for Nebulization 3 milliLiter(s) Nebulizer every 6 hours  aluminum hydroxide/magnesium hydroxide/simethicone Suspension 30 milliLiter(s) Oral every 4 hours PRN  atorvastatin 80 milliGRAM(s) Oral at bedtime  cholecalciferol 1000 Unit(s) Oral daily  clopidogrel Tablet 75 milliGRAM(s) Oral daily  cyanocobalamin 1000 MICROGram(s) Oral daily  ferrous sulfate Oral Tab/Cap - Peds 325 milliGRAM(s) Oral two times a day with meals  heparin   Injectable 5000 Unit(s) SubCutaneous every 8 hours  levothyroxine 125 MICROGram(s) Oral daily  melatonin 3 milliGRAM(s) Oral at bedtime PRN  metoprolol succinate ER 75 milliGRAM(s) Oral daily  mirtazapine 15 milliGRAM(s) Oral at bedtime  ondansetron Injectable 4 milliGRAM(s) IV Push every 8 hours PRN  pantoprazole    Tablet 40 milliGRAM(s) Oral before breakfast  polyethylene glycol 3350 17 Gram(s) Oral daily      PHYSICAL EXAM:  General: NAD, Alert elderly female  ENT: MMM, no oral thrush  Neck: Supple, No JVD  Lungs: Clear to auscultation bilaterally, non labored, bilateral air entry  Cardio: RRR, S1/S2, No murmurs  Abdomen: Soft, Nontender, Nondistended; Bowel sounds present  Extremities: No cyanosis, No edema    LABS:                        9.4    8.88  )-----------( 325      ( 20 Oct 2022 07:37 )             31.3     10-20    139  |  101  |  56  ----------------------------<  122  4.7   |  31  |  2.04    Ca    9.0      20 Oct 2022 07:37  Mg     2.4     10-20    COVID-19 PCR: NotDetec (10-20-22 @ 06:23)  COVID-19 PCR: NotDetec (10-13-22 @ 12:20)    RADIOLOGY & ADDITIONAL TESTS:    Care Discussed with Consultants/Other Providers:

## 2022-10-20 NOTE — PROGRESS NOTE ADULT - ASSESSMENT
89 year old F PMH CAD s/p CABG x3 (5/2010), DM type 2, HTN, HLD presenting to the ED from [facility] for SOB and bilateral LE edema admitted for SOB    # Acute Hypoxic Respiratory Failure due to probable Acute/chronic Diastolic CHF  TTE showed preserved EF, grade 2 DD, mild LVH, mod enlarged RV  CXR on 10/23 showed cardiomegaly w/o gross airspace consolidation or effusion, BNP 8680  maintain supplemental oxygen, wean as tolerated (pt is not on home oxygen)  Pro BNP downtrending, repeat CXR with scant effusions, inc SCr after IV lasix  pt likely euvolemic, transition to lasix 40 po daily, renal evaluation appreciated  stopped ARB, hold lasix 24-48 hours  strict I/Os, daily weights  continue duonebs, incentive spirometer  stopped steroid taper because does not have COPD  PT recommends KAITY    # MARGARET/CKD 3  renal appreciated  hemodynamic/cardio renal MARGARET (Cr 1.02 - 7/19/22)  avoid ACE/ARB (stopped ARB)  no overt volume overload at this time  hold lasix 24-48 hours  follow BMP    # E Coli UTI  # Fever  pt completed course of IV Rocephin for UTI, no Fever  monitor labs    # Anemia with Iron Deficiency  s/p iron x 2 doses  FOBT negative  monitor CBC  transfuse Hgb < 8 or is symptomatic    # CAD s/p CABG  # HTN  continue home plavix, atorvastatin  stopped losartan, increase metoprolol xl 75 mg daily as per cardiology  NST may be beneficial to evaluate for ischemia, but pt and family prefer conservative mngmt    # Hypothyroid  continue levothyroxine 125 mcg daily    # DVT PPX  heparin    # Disposition  anticipate dc KAITY 24-48 hours pending cardiology and renal clearance  updated son Khris [phone] today at bedside 89 year old F PMH CAD s/p CABG x3 (5/2010), DM type 2, HTN, HLD presenting to the ED from [facility] for SOB and bilateral LE edema admitted for SOB    # Acute Hypoxic Respiratory Failure due to probable Acute on chronic Diastolic CHF  TTE showed preserved EF, grade 2 DD, mild LVH, mod enlarged RV  CXR on 10/23 showed cardiomegaly w/o gross airspace consolidation or effusion, BNP 8680  maintain supplemental oxygen, wean as tolerated (pt is not on home oxygen)  Pro BNP downtrending, repeat CXR with scant effusions, inc SCr after IV lasix  pt likely euvolemic, transition to lasix 40 po daily, renal evaluation appreciated  stopped ARB, hold lasix 24-48 hours  strict I/Os, daily weights  continue duonebs, incentive spirometer  stopped steroid taper because does not have COPD  PT recommends KAITY    # MARGARET on CKD 3  renal appreciated  hemodynamic/cardio renal MARGARET (Cr 1.02 - 7/19/22)  avoid ACE/ARB (stopped ARB)  no overt volume overload at this time  hold lasix 24-48 hours  follow BMP    # E Coli UTI  # Fever  pt completed course of IV Rocephin for UTI, no Fever  monitor labs    # Anemia with Iron Deficiency  s/p iron x 2 doses  FOBT negative  monitor CBC  transfuse Hgb < 8 or is symptomatic    # CAD s/p CABG  # HTN  continue home plavix, atorvastatin  stopped losartan, increase metoprolol xl 75 mg daily as per cardiology  NST may be beneficial to evaluate for ischemia, but pt and family prefer conservative mngmt    # Hypothyroid  continue levothyroxine 125 mcg daily    # DVT PPX  heparin    # Disposition  anticipate dc KAITY 24-48 hours pending cardiology and renal clearance  updated son Khris [phone] today at bedside

## 2022-10-20 NOTE — PROGRESS NOTE ADULT - ASSESSMENT
89 year old woman admitted for acute exacerbation of diastolic CHF and anemia.  Known CAD s/p remote CABG . CKD  Cardiac enzymes have been negative.  Echo with preserved LV function, decreased RV function     Plan   - consider standing diuretic dosing when renal function stable  - continue metoprolol and statin   - consider ASA if no contraindication  - monitor labs  - advance activity     discussed with patient and with Medicine team
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
## 2022-10-20 NOTE — PROGRESS NOTE ADULT - SUBJECTIVE AND OBJECTIVE BOX
No distress, on NC O2    Vital Signs Last 24 Hrs  T(C): 36.4 (10-20-22 @ 14:15), Max: 36.9 (10-20-22 @ 05:44)  T(F): 97.6 (10-20-22 @ 14:15), Max: 98.5 (10-20-22 @ 05:44)  HR: 84 (10-20-22 @ 15:26) (84 - 87)  BP: 144/75 (10-20-22 @ 14:15) (144/75 - 157/83)  RR: 16 (10-20-22 @ 14:15) (14 - 16)  SpO2: 95% (10-20-22 @ 15:26) (93% - 97%)    I&O's Detail    19 Oct 2022 07:01  -  20 Oct 2022 07:00  --------------------------------------------------------  IN:    Oral Fluid: 240 mL  Total IN: 240 mL    OUT:    Voided (mL): 700 mL  Total OUT: 700 mL    Total NET: -460 mL    20 Oct 2022 07:01  -  20 Oct 2022 20:42  --------------------------------------------------------  IN:    Oral Fluid: 360 mL  Total IN: 360 mL    OUT:    Voided (mL): 240 mL  Total OUT: 240 mL    Total NET: 120 mL    s1s2  b/l air entry  soft, ND  no edema  alert                        9.4    8.88  )-----------( 325      ( 20 Oct 2022 07:37 )             31.3     20 Oct 2022 07:37    139    |  101    |  56     ----------------------------<  122    4.7     |  31     |  2.04     Ca    9.0        20 Oct 2022 07:37  Mg     2.4       20 Oct 2022 07:37    acetaminophen     Tablet .. 650 milliGRAM(s) Oral every 6 hours PRN  albuterol/ipratropium for Nebulization 3 milliLiter(s) Nebulizer every 6 hours  aluminum hydroxide/magnesium hydroxide/simethicone Suspension 30 milliLiter(s) Oral every 4 hours PRN  atorvastatin 80 milliGRAM(s) Oral at bedtime  cholecalciferol 1000 Unit(s) Oral daily  clopidogrel Tablet 75 milliGRAM(s) Oral daily  cyanocobalamin 1000 MICROGram(s) Oral daily  ferrous sulfate Oral Tab/Cap - Peds 325 milliGRAM(s) Oral two times a day with meals  heparin   Injectable 5000 Unit(s) SubCutaneous every 8 hours  levothyroxine 125 MICROGram(s) Oral daily  melatonin 3 milliGRAM(s) Oral at bedtime PRN  metoprolol succinate ER 75 milliGRAM(s) Oral daily  mirtazapine 15 milliGRAM(s) Oral at bedtime  ondansetron Injectable 4 milliGRAM(s) IV Push every 8 hours PRN  pantoprazole    Tablet 40 milliGRAM(s) Oral before breakfast  polyethylene glycol 3350 17 Gram(s) Oral daily    A/P:    Hemodynamic/cardio-renal MARGARET/CKD 3 (Cr 1.02 - 7/19/22)  Avoid nephrotoxins  Would avoid ACE/ARB in future  No overt volume overload at this time  SOB much improved since adm on 10/13/22 per the pt  Would hold Lasix   F/u BMP  D/w son at bedside    [phone]

## 2022-10-21 ENCOUNTER — TRANSCRIPTION ENCOUNTER (OUTPATIENT)
Age: 87
End: 2022-10-21

## 2022-10-21 LAB
ANION GAP SERPL CALC-SCNC: 3 MMOL/L — LOW (ref 5–17)
BUN SERPL-MCNC: 54 MG/DL — HIGH (ref 7–23)
CALCIUM SERPL-MCNC: 8.7 MG/DL — SIGNIFICANT CHANGE UP (ref 8.4–10.5)
CHLORIDE SERPL-SCNC: 106 MMOL/L — SIGNIFICANT CHANGE UP (ref 96–108)
CO2 SERPL-SCNC: 34 MMOL/L — HIGH (ref 22–31)
CREAT SERPL-MCNC: 1.78 MG/DL — HIGH (ref 0.5–1.3)
EGFR: 26 ML/MIN/1.73M2 — LOW
GLUCOSE SERPL-MCNC: 119 MG/DL — HIGH (ref 70–99)
HCT VFR BLD CALC: 29.6 % — LOW (ref 34.5–45)
HGB BLD-MCNC: 8.8 G/DL — LOW (ref 11.5–15.5)
MCHC RBC-ENTMCNC: 27.2 PG — SIGNIFICANT CHANGE UP (ref 27–34)
MCHC RBC-ENTMCNC: 29.7 GM/DL — LOW (ref 32–36)
MCV RBC AUTO: 91.6 FL — SIGNIFICANT CHANGE UP (ref 80–100)
NRBC # BLD: 0 /100 WBCS — SIGNIFICANT CHANGE UP (ref 0–0)
PLATELET # BLD AUTO: 316 K/UL — SIGNIFICANT CHANGE UP (ref 150–400)
POTASSIUM SERPL-MCNC: 4.6 MMOL/L — SIGNIFICANT CHANGE UP (ref 3.5–5.3)
POTASSIUM SERPL-SCNC: 4.6 MMOL/L — SIGNIFICANT CHANGE UP (ref 3.5–5.3)
RBC # BLD: 3.23 M/UL — LOW (ref 3.8–5.2)
RBC # FLD: 16.4 % — HIGH (ref 10.3–14.5)
SODIUM SERPL-SCNC: 143 MMOL/L — SIGNIFICANT CHANGE UP (ref 135–145)
WBC # BLD: 8.79 K/UL — SIGNIFICANT CHANGE UP (ref 3.8–10.5)
WBC # FLD AUTO: 8.79 K/UL — SIGNIFICANT CHANGE UP (ref 3.8–10.5)

## 2022-10-21 PROCEDURE — 99239 HOSP IP/OBS DSCHRG MGMT >30: CPT

## 2022-10-21 PROCEDURE — 99232 SBSQ HOSP IP/OBS MODERATE 35: CPT

## 2022-10-21 RX ADMIN — Medication 325 MILLIGRAM(S): at 09:54

## 2022-10-21 RX ADMIN — POLYETHYLENE GLYCOL 3350 17 GRAM(S): 17 POWDER, FOR SOLUTION ORAL at 13:43

## 2022-10-21 RX ADMIN — Medication 325 MILLIGRAM(S): at 18:21

## 2022-10-21 RX ADMIN — ATORVASTATIN CALCIUM 80 MILLIGRAM(S): 80 TABLET, FILM COATED ORAL at 21:34

## 2022-10-21 RX ADMIN — HEPARIN SODIUM 5000 UNIT(S): 5000 INJECTION INTRAVENOUS; SUBCUTANEOUS at 05:46

## 2022-10-21 RX ADMIN — Medication 3 MILLILITER(S): at 09:00

## 2022-10-21 RX ADMIN — HEPARIN SODIUM 5000 UNIT(S): 5000 INJECTION INTRAVENOUS; SUBCUTANEOUS at 21:34

## 2022-10-21 RX ADMIN — Medication 75 MILLIGRAM(S): at 05:47

## 2022-10-21 RX ADMIN — MIRTAZAPINE 15 MILLIGRAM(S): 45 TABLET, ORALLY DISINTEGRATING ORAL at 21:34

## 2022-10-21 RX ADMIN — PREGABALIN 1000 MICROGRAM(S): 225 CAPSULE ORAL at 13:42

## 2022-10-21 RX ADMIN — Medication 1000 UNIT(S): at 13:42

## 2022-10-21 RX ADMIN — Medication 3 MILLILITER(S): at 20:41

## 2022-10-21 RX ADMIN — HEPARIN SODIUM 5000 UNIT(S): 5000 INJECTION INTRAVENOUS; SUBCUTANEOUS at 15:15

## 2022-10-21 RX ADMIN — PANTOPRAZOLE SODIUM 40 MILLIGRAM(S): 20 TABLET, DELAYED RELEASE ORAL at 05:48

## 2022-10-21 RX ADMIN — CLOPIDOGREL BISULFATE 75 MILLIGRAM(S): 75 TABLET, FILM COATED ORAL at 13:42

## 2022-10-21 RX ADMIN — Medication 125 MICROGRAM(S): at 05:47

## 2022-10-21 RX ADMIN — Medication 3 MILLILITER(S): at 16:23

## 2022-10-21 NOTE — DISCHARGE NOTE PROVIDER - NSDCMRMEDTOKEN_GEN_ALL_CORE_FT
atorvastatin 80 mg oral tablet: 1 tab(s) orally once a day  clopidogrel 75 mg oral tablet: 1 tab(s) orally once a day   levothyroxine 125 mcg (0.125 mg) oral tablet: 1 tab(s) orally once a day  losartan 100 mg oral tablet: 1 tab(s) orally once a day  metoprolol tartrate 50 mg oral tablet: 1 tab(s) orally 2 times a day  mirtazapine 15 mg oral tablet: 1 tab(s) orally once a day (at bedtime)  pantoprazole 40 mg oral delayed release tablet: 1 tab(s) orally once a day  Vitamin B12 1000 mcg oral tablet: 1 tab(s) orally once a day  Vitamin D3 25 mcg (1000 intl units) oral tablet: 1 tab(s) orally once a day   atorvastatin 80 mg oral tablet: 1 tab(s) orally once a day (at bedtime)  cholecalciferol oral tablet: 1000 unit(s) orally once a day  clopidogrel 75 mg oral tablet: 1 tab(s) orally once a day  cyanocobalamin 1000 mcg oral tablet: 1 tab(s) orally once a day  ipratropium-albuterol 0.5 mg-2.5 mg/3 mL inhalation solution: 3 milliliter(s) inhaled every 6 hours  levothyroxine 125 mcg (0.125 mg) oral tablet: 1 tab(s) orally once a day  metoprolol succinate 25 mg oral tablet, extended release: 3 tab(s) orally once a day  mirtazapine 15 mg oral tablet: 1 tab(s) orally once a day (at bedtime)  pantoprazole 40 mg oral delayed release tablet: 1 tab(s) orally once a day (before a meal)  polyethylene glycol 3350 oral powder for reconstitution: 17 gram(s) orally once a day

## 2022-10-21 NOTE — DISCHARGE NOTE PROVIDER - CARE PROVIDER_API CALL
Jen Plascencia  INTERNAL MEDICINE  [address] [facility], [address]  [place] [address]  Phone: [phone]  Fax: [phone]  Follow Up Time:

## 2022-10-21 NOTE — DISCHARGE NOTE PROVIDER - NSDCCPCAREPLAN_GEN_ALL_CORE_FT
PRINCIPAL DISCHARGE DIAGNOSIS  Diagnosis: CHF exacerbation  Assessment and Plan of Treatment: You were admitted for Cough and SOB   You were diagnosed with Diastolic Congestive Heart Failure  and UTI  You were treated with Iv lasix change to oral eventually and antibiotics   You were prescribed the following new medications: Lasix 20mg po daily with follow up renal profile in 4 days   You will need to follow up with your primary care physician.      SECONDARY DISCHARGE DIAGNOSES  Diagnosis: COPD exacerbation  Assessment and Plan of Treatment:
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
## 2022-10-21 NOTE — PROGRESS NOTE ADULT - SUBJECTIVE AND OBJECTIVE BOX
Patient is a 89y old  Female who presents with a chief complaint of SOB (20 Oct 2022 20:41)      Patient seen and examined at bedside.    ALLERGIES:  No Known Allergies    MEDICATIONS  (STANDING):  albuterol/ipratropium for Nebulization 3 milliLiter(s) Nebulizer every 6 hours  atorvastatin 80 milliGRAM(s) Oral at bedtime  cholecalciferol 1000 Unit(s) Oral daily  clopidogrel Tablet 75 milliGRAM(s) Oral daily  cyanocobalamin 1000 MICROGram(s) Oral daily  ferrous sulfate Oral Tab/Cap - Peds 325 milliGRAM(s) Oral two times a day with meals  heparin   Injectable 5000 Unit(s) SubCutaneous every 8 hours  levothyroxine 125 MICROGram(s) Oral daily  metoprolol succinate ER 75 milliGRAM(s) Oral daily  mirtazapine 15 milliGRAM(s) Oral at bedtime  pantoprazole    Tablet 40 milliGRAM(s) Oral before breakfast  polyethylene glycol 3350 17 Gram(s) Oral daily    MEDICATIONS  (PRN):  acetaminophen     Tablet .. 650 milliGRAM(s) Oral every 6 hours PRN Temp greater or equal to 38C (100.4F), Mild Pain (1 - 3)  aluminum hydroxide/magnesium hydroxide/simethicone Suspension 30 milliLiter(s) Oral every 4 hours PRN Dyspepsia  melatonin 3 milliGRAM(s) Oral at bedtime PRN Insomnia  ondansetron Injectable 4 milliGRAM(s) IV Push every 8 hours PRN Nausea and/or Vomiting    Vital Signs Last 24 Hrs  T(F): 98.5 (21 Oct 2022 05:41), Max: 98.5 (21 Oct 2022 05:41)  HR: 73 (21 Oct 2022 05:41) (70 - 86)  BP: 152/64 (21 Oct 2022 05:41) (113/61 - 152/64)  RR: 16 (21 Oct 2022 05:41) (16 - 16)  SpO2: 98% (21 Oct 2022 05:41) (93% - 100%)  I&O's Summary    20 Oct 2022 07:01  -  21 Oct 2022 07:00  --------------------------------------------------------  IN: 360 mL / OUT: 1040 mL / NET: -680 mL      PHYSICAL EXAM:  General: NAD, A/O x 3  ENT: MMM  Neck: Supple, No JVD  Lungs: Clear to auscultation bilaterally, Non labored breathing   Cardio: RRR, S1/S2, No murmurs  Abdomen: Soft, Nontender, Nondistended; Bowel sounds present  Extremities: No calf tenderness, No pitting edema    LABS:                        8.8    8.79  )-----------( 316      ( 21 Oct 2022 06:50 )             29.6     10-20    139  |  101  |  56  ----------------------------<  122  4.7   |  31  |  2.04    Ca    9.0      20 Oct 2022 07:37  Mg     2.4     10-20                                      COVID-19 PCR: NotDetec (10-20-22 @ 06:23)  COVID-19 PCR: NotDetec (10-13-22 @ 12:20)    RADIOLOGY & ADDITIONAL TESTS:    Care Discussed with Consultants/Other Providers:    Patient is a 89y old  Female who presents with a chief complaint of SOB- Diastolic heart failure       Patient seen and examined at bedside.  Pt without complaints this am .  No overnight events .  Denies pain     ALLERGIES:  No Known Allergies    MEDICATIONS  (STANDING):  albuterol/ipratropium for Nebulization 3 milliLiter(s) Nebulizer every 6 hours  atorvastatin 80 milliGRAM(s) Oral at bedtime  cholecalciferol 1000 Unit(s) Oral daily  clopidogrel Tablet 75 milliGRAM(s) Oral daily  cyanocobalamin 1000 MICROGram(s) Oral daily  ferrous sulfate Oral Tab/Cap - Peds 325 milliGRAM(s) Oral two times a day with meals  heparin   Injectable 5000 Unit(s) SubCutaneous every 8 hours  levothyroxine 125 MICROGram(s) Oral daily  metoprolol succinate ER 75 milliGRAM(s) Oral daily  mirtazapine 15 milliGRAM(s) Oral at bedtime  pantoprazole    Tablet 40 milliGRAM(s) Oral before breakfast  polyethylene glycol 3350 17 Gram(s) Oral daily    MEDICATIONS  (PRN):  acetaminophen     Tablet .. 650 milliGRAM(s) Oral every 6 hours PRN Temp greater or equal to 38C (100.4F), Mild Pain (1 - 3)  aluminum hydroxide/magnesium hydroxide/simethicone Suspension 30 milliLiter(s) Oral every 4 hours PRN Dyspepsia  melatonin 3 milliGRAM(s) Oral at bedtime PRN Insomnia  ondansetron Injectable 4 milliGRAM(s) IV Push every 8 hours PRN Nausea and/or Vomiting    Vital Signs Last 24 Hrs  T(F): 98.5 (21 Oct 2022 05:41), Max: 98.5 (21 Oct 2022 05:41)  HR: 73 (21 Oct 2022 05:41) (70 - 86)  BP: 152/64 (21 Oct 2022 05:41) (113/61 - 152/64)  RR: 16 (21 Oct 2022 05:41) (16 - 16)  SpO2: 98% (21 Oct 2022 05:41) (93% - 100%)  I&O's Summary    20 Oct 2022 07:01  -  21 Oct 2022 07:00  --------------------------------------------------------  IN: 360 mL / OUT: 1040 mL / NET: -680 mL      PHYSICAL EXAM:  General: 89 y/o female in  NAD, A/O x 3  ENT: MMM  Neck: Supple, No JVD  Lungs: Clear to auscultation bilaterally, Non labored breathing   Cardio: RRR, S1/S2, No murmurs  Abdomen: Soft, Nontender, Nondistended; Bowel sounds present  Extremities: No calf tenderness, No pitting edema    LABS:                        8.8    8.79  )-----------( 316      ( 21 Oct 2022 06:50 )             29.6     10-20    139  |  101  |  56  ----------------------------<  122  4.7   |  31  |  2.04    Ca    9.0      20 Oct 2022 07:37  Mg     2.4     10-20                                      COVID-19 PCR: NotDetec (10-20-22 @ 06:23)  COVID-19 PCR: NotDetec (10-13-22 @ 12:20)    RADIOLOGY & ADDITIONAL TESTS:    Care Discussed with Consultants/Other Providers:

## 2022-10-21 NOTE — DISCHARGE NOTE PROVIDER - DETAILS OF MALNUTRITION DIAGNOSIS/DIAGNOSES
This patient has been assessed with a concern for Malnutrition and was treated during this hospitalization for the following Nutrition diagnosis/diagnoses:     -  10/14/2022: Moderate protein-calorie malnutrition

## 2022-10-21 NOTE — DISCHARGE NOTE PROVIDER - HOSPITAL COURSE
Hospital Course  89 year old F PMH CAD s/p CABG x3 (5/2010), DM type 2, HTN, HLD presenting to the ED from [facility] for SOB and bilateral LE edema. Patient is good historian and states that for the last few days has been having cough, that has gotten worse but is nonproductive and SOB worse this morning  Patient currently on 2L NC but not on home O2. Patient also states that her lower extremity swelling has been worse but denies pain. Pt diagnosed and treated for Diastolic heart failure and acute urinary tract infection.  Pt had Cardio consult , was started on IV laxis to diureis.  Pt had echo results below with EF of 60-65% and mild pulmonary HTN.  Pt also had renal evaluation for MARGARET.  Pt had diuretics held for 48 hours .  Pt now to be discharge to [facility] for rehab prior to going back to [facility] assisted living facility           Summary:   1. Biatrial enlargement   2. Left ventricular ejection fraction, by visual estimation, is 60 to   65%.   3. Normal global left ventricular systolic function.   4. Normal left ventricular internal cavity size.   5. Spectral Doppler shows pseudonormal pattern of left ventricular   myocardial filling (Grade II diastolic dysfunction).   6. There is mild concentric left ventricular hypertrophy.   7. Mildly reduced RV systolic function.   8. Moderately enlarged right ventricle.   9. Mild mitral annular calcification.  10. Trace mitral valve regurgitation.  11. Mild tricuspid regurgitation.  12. Mild aortic regurgitation.  13. Mild to moderate pulmonic valve regurgitation.  14. Estimated pulmonary artery systolic pressure is 40.4 mmHg assuming a   right atrial pressure of 15 mmHg, which is consistent with mild pulmonary   hypertension.    [id number] Oneil Lay , Electronically signed on   10/14/2022 at 2:42:13 PM        You were admitted for Cough and SOB   You were diagnosed with Diastolic Congestive Heart Failure  and   You were treated with Iv lasix change to oral eventually and antibiotics   You were prescribed the following new medications:    You will need to follow up with your primary care physician.    Source of Infection:  uti   Antibiotic / Last Day:  Completed     Palliative Care / Advanced Care Planning  Code Status:  DNR   Patient/Family agreeable to Hospice/Palliative (N)?  Summary of Goals of Care Conversation:    Discharging Provider:  Janelle Balbuena   Contact Info: Cell [phone] - Please call with any questions or concerns.    Outpatient Provider: Dr Jen CUELLAR     Hospital Course  89 year old F PMH CAD s/p CABG x3 (5/2010), DM type 2, HTN, HLD presenting to the ED from [facility] for SOB and bilateral LE edema. Patient is good historian and states that for the last few days has been having cough, that has gotten worse but is nonproductive and SOB worse this morning  Patient currently on 2L NC but not on home O2. Patient also states that her lower extremity swelling has been worse but denies pain. Pt diagnosed and treated for Diastolic heart failure and acute urinary tract infection.  Pt had Cardio consult , was started on IV laxis to diureis.  Pt had echo results below with EF of 60-65% and mild pulmonary HTN.  Pt also had renal evaluation for MARGARET.  Pt had diuretics held for 48 hours .  Pt now to be discharge to [facility] for rehab on lasix 20mg daily with follow up BMP in 4 days.           Summary:   1. Biatrial enlargement   2. Left ventricular ejection fraction, by visual estimation, is 60 to   65%.   3. Normal global left ventricular systolic function.   4. Normal left ventricular internal cavity size.   5. Spectral Doppler shows pseudonormal pattern of left ventricular   myocardial filling (Grade II diastolic dysfunction).   6. There is mild concentric left ventricular hypertrophy.   7. Mildly reduced RV systolic function.   8. Moderately enlarged right ventricle.   9. Mild mitral annular calcification.  10. Trace mitral valve regurgitation.  11. Mild tricuspid regurgitation.  12. Mild aortic regurgitation.  13. Mild to moderate pulmonic valve regurgitation.  14. Estimated pulmonary artery systolic pressure is 40.4 mmHg assuming a   right atrial pressure of 15 mmHg, which is consistent with mild pulmonary   hypertension.    [id number] Oneil Lay , Electronically signed on   10/14/2022 at 2:42:13 PM        You were admitted for Cough and SOB   You were diagnosed with Diastolic Congestive Heart Failure  and UTI  You were treated with Iv lasix change to oral eventually and antibiotics   You were prescribed the following new medications: Lasix 20mg po daily with follow up renal profile in 4 days     You will need to follow up with your primary care physician.    Source of Infection:  uti   Antibiotic / Last Day:  Completed     Palliative Care / Advanced Care Planning  Code Status:  DNR   Patient/Family agreeable to Hospice/Palliative (N)?  Summary of Goals of Care Conversation:    Discharging Provider:  Janelle Balbuena   Contact Info: Cell [phone] - Please call with any questions or concerns.    Outpatient Provider: Dr Jen Plascencia

## 2022-10-21 NOTE — PROGRESS NOTE ADULT - ASSESSMENT
89 year old woman admitted for acute exacerbation of diastolic CHF and anemia.  Known CAD s/p remote CABG . CKD  Cardiac enzymes have been negative.  Echo with preserved LV function, decreased RV function   MARGARET. Anemia     Plan   - consider standing diuretic dosing when renal function stable  - continue metoprolol and statin and clopidogrel   - monitor labs  - advance activity     discussed with patient and with Medicine team

## 2022-10-21 NOTE — PROGRESS NOTE ADULT - SUBJECTIVE AND OBJECTIVE BOX
No distress, on NC O2    Vital Signs Last 24 Hrs  T(C): 36.6 (10-21-22 @ 12:38), Max: 36.9 (10-21-22 @ 05:41)  T(F): 97.8 (10-21-22 @ 12:38), Max: 98.5 (10-21-22 @ 05:41)  HR: 78 (10-21-22 @ 12:38) (70 - 78)  BP: 116/56 (10-21-22 @ 12:38) (113/61 - 152/64)  RR: 16 (10-21-22 @ 12:38) (16 - 16)  SpO2: 91% (10-21-22 @ 12:38) (91% - 100%)    I&O's Detail    20 Oct 2022 07:01  -  21 Oct 2022 07:00  --------------------------------------------------------  OUT:    Blood Loss (mL): 0 mL    Voided (mL): 1040 mL  Total OUT: 1040 mL    21 Oct 2022 07:01  -  21 Oct 2022 19:02  --------------------------------------------------------  OUT:    Voided (mL): 600 mL  Total OUT: 600 mL    s1s2  b/l air entry  soft, ND  no edema                        8.8    8.79  )-----------( 316      ( 21 Oct 2022 06:50 )             29.6     21 Oct 2022 06:50    143    |  106    |  54     ----------------------------<  119    4.6     |  34     |  1.78     Ca    8.7        21 Oct 2022 06:50  Mg     2.4       20 Oct 2022 07:37    acetaminophen     Tablet .. 650 milliGRAM(s) Oral every 6 hours PRN  albuterol/ipratropium for Nebulization 3 milliLiter(s) Nebulizer every 6 hours  aluminum hydroxide/magnesium hydroxide/simethicone Suspension 30 milliLiter(s) Oral every 4 hours PRN  atorvastatin 80 milliGRAM(s) Oral at bedtime  cholecalciferol 1000 Unit(s) Oral daily  clopidogrel Tablet 75 milliGRAM(s) Oral daily  cyanocobalamin 1000 MICROGram(s) Oral daily  ferrous sulfate Oral Tab/Cap - Peds 325 milliGRAM(s) Oral two times a day with meals  heparin   Injectable 5000 Unit(s) SubCutaneous every 8 hours  levothyroxine 125 MICROGram(s) Oral daily  melatonin 3 milliGRAM(s) Oral at bedtime PRN  metoprolol succinate ER 75 milliGRAM(s) Oral daily  mirtazapine 15 milliGRAM(s) Oral at bedtime  ondansetron Injectable 4 milliGRAM(s) IV Push every 8 hours PRN  pantoprazole    Tablet 40 milliGRAM(s) Oral before breakfast  polyethylene glycol 3350 17 Gram(s) Oral daily    A/P:    Hemodynamic/cardio-renal MARGARET/CKD 3 (Cr 1.02 - 7/19/22)  Avoid nephrotoxins  Would avoid ACE/ARB in future  No overt volume overload at this time  SOB much improved since adm on 10/13/22   Cr is better today  No objection to restarting diuretics   F/u [facility]    [phone]

## 2022-10-21 NOTE — PROGRESS NOTE ADULT - ASSESSMENT
89 year old F PMH CAD s/p CABG x3 (5/2010), DM type 2, HTN, HLD presenting to the ED from [facility] for SOB and bilateral LE edema admitted for SOB    # Acute Hypoxic Respiratory Failure due to probable Acute/chronic Diastolic CHF  TTE showed preserved EF, grade 2 DD, mild LVH, mod enlarged RV  CXR on 10/23 showed cardiomegaly w/o gross airspace consolidation or effusion, BNP 8680  maintain supplemental oxygen, wean as tolerated (pt is not on home oxygen)  Pro BNP downtrending, repeat CXR with scant effusions, inc SCr after IV lasix  pt likely euvolemic, transition to lasix 40 po daily, renal evaluation appreciated  stopped ARB, hold lasix 24-48 hours  strict I/Os, daily weights  continue duonebs, incentive spirometer  stopped steroid taper because does not have COPD  PT recommends [facility]    # MARGARET/CKD 3  renal appreciated  hemodynamic/cardio renal MARGARET (Cr 1.02 - 7/19/22)  avoid ACE/ARB (stopped ARB)  no overt volume overload at this time  hold lasix 24-48 hours  follow BMP    # E Coli UTI  # Fever  pt completed course of IV Rocephin for UTI, no Fever  monitor labs    # Anemia with Iron Deficiency  s/p iron x 2 doses  FOBT negative  monitor CBC  transfuse Hgb < 8 or is symptomatic    # CAD s/p CABG  # HTN  continue home plavix, atorvastatin  stopped losartan, increase metoprolol xl 75 mg daily as per cardiology  NST may be beneficial to evaluate for ischemia, but pt and family prefer conservative mngmt    # Hypothyroid  continue levothyroxine 125 mcg daily    # DVT PPX  heparin    # Disposition  anticipate dc [facility] 24-48 hours pending cardiology and renal clearance  updated son Khris [phone] today at bedside 89 year old F PMH CAD s/p CABG x3 (5/2010), DM type 2, HTN, HLD presenting to the ED from [facility] for SOB and bilateral LE edema admitted for SOB    # Acute Hypoxic Respiratory Failure due to probable Acute/chronic Diastolic CHF  TTE showed preserved EF, grade 2 DD, mild LVH, mod enlarged RV  CXR on 10/23 showed cardiomegaly w/o gross airspace consolidation or effusion, BNP 8680  maintain supplemental oxygen, wean as tolerated (pt is not on home oxygen)  Pro BNP downtrending, repeat CXR with scant effusions, inc SCr after IV lasix  pt likely euvolemic, transition to lasix 40 po daily, renal evaluation appreciated  stopped ARB, hold lasix 24-48 hours  strict I/Os, daily weights  continue duonebs, incentive spirometer  stopped steroid taper because does not have COPD  PT recommends [facility]- Pt accept at [facility]     # MARGARET/CKD 3  renal following - recs appreciated   hemodynamic/cardio renal MARGARET (Cr 1.02 - 7/19/22)  avoid ACE/ARB (stopped ARB)  no overt volume overload at this time  hold lasix 24-48 hours  follow BMP    # E Coli UTI  # Fever  pt completed course of IV Rocephin for UTI, no Fever  monitor labs    # Anemia with Iron Deficiency  s/p iron x 2 doses  FOBT negative  monitor CBC  transfuse Hgb < 8 or is symptomatic    # CAD s/p CABG  # HTN  continue home plavix, atorvastatin  stopped losartan, continue  metoprolol xl 75 mg daily as per cardiology-   NST may be beneficial to evaluate for ischemia, but pt and family prefer conservative mngmt    # Hypothyroid  continue levothyroxine 125 mcg daily    # DVT PPX  heparin    # Disposition  anticipate dc [facility] 24-48 hours pending cardiology and renal clearance  10/21 updated son Khris [phone]  89 year old F PMH CAD s/p CABG x3 (5/2010), DM type 2, HTN, HLD presenting to the ED from [facility] for SOB and bilateral LE edema admitted for SOB    # Acute Hypoxic Respiratory Failure due to probable Acute on chronic Diastolic CHF  TTE showed preserved EF, grade 2 DD, mild LVH, mod enlarged RV  CXR on 10/23 showed cardiomegaly w/o gross airspace consolidation or effusion, BNP 8680  maintain supplemental oxygen, wean as tolerated (pt is not on home oxygen)  Pro BNP downtrending, repeat CXR with scant effusions, inc SCr after IV lasix  pt likely euvolemic, transition to lasix 40 po daily, renal evaluation appreciated  stopped ARB, hold lasix 24-48 hours  strict I/Os, daily weights  continue duonebs, incentive spirometer  stopped steroid taper because does not have COPD  PT recommends [facility]- Pt accept at [facility]     # MARGARET on CKD 3  renal following - recs appreciated   hemodynamic/cardio renal MARGARET (Cr 1.02 - 7/19/22)  avoid ACE/ARB (stopped ARB)  no overt volume overload at this time  hold lasix 24-48 hours  follow BMP    # E Coli UTI  # Fever  pt completed course of IV Rocephin for UTI, no Fever  monitor labs    # Anemia with Iron Deficiency  s/p iron x 2 doses  FOBT negative  monitor CBC  transfuse Hgb < 8 or is symptomatic    # CAD s/p CABG  # HTN  continue home plavix, atorvastatin  stopped losartan, continue  metoprolol xl 75 mg daily as per cardiology-   NST may be beneficial to evaluate for ischemia, but pt and family prefer conservative mngmt    # Hypothyroid  continue levothyroxine 125 mcg daily    # DVT PPX  heparin    # Disposition  anticipate dc [facility] 24-48 hours pending cardiology and renal clearance  10/21 updated son Khris [phone]

## 2022-10-22 ENCOUNTER — TRANSCRIPTION ENCOUNTER (OUTPATIENT)
Age: 87
End: 2022-10-22

## 2022-10-22 VITALS
TEMPERATURE: 99 F | OXYGEN SATURATION: 99 % | HEART RATE: 73 BPM | RESPIRATION RATE: 18 BRPM | DIASTOLIC BLOOD PRESSURE: 52 MMHG | SYSTOLIC BLOOD PRESSURE: 135 MMHG

## 2022-10-22 LAB
ANION GAP SERPL CALC-SCNC: 7 MMOL/L — SIGNIFICANT CHANGE UP (ref 5–17)
BUN SERPL-MCNC: 48 MG/DL — HIGH (ref 7–23)
CALCIUM SERPL-MCNC: 9.2 MG/DL — SIGNIFICANT CHANGE UP (ref 8.4–10.5)
CHLORIDE SERPL-SCNC: 104 MMOL/L — SIGNIFICANT CHANGE UP (ref 96–108)
CO2 SERPL-SCNC: 28 MMOL/L — SIGNIFICANT CHANGE UP (ref 22–31)
CREAT SERPL-MCNC: 1.41 MG/DL — HIGH (ref 0.5–1.3)
EGFR: 35 ML/MIN/1.73M2 — LOW
GLUCOSE SERPL-MCNC: 97 MG/DL — SIGNIFICANT CHANGE UP (ref 70–99)
HCT VFR BLD CALC: 33.7 % — LOW (ref 34.5–45)
HGB BLD-MCNC: 9.9 G/DL — LOW (ref 11.5–15.5)
MCHC RBC-ENTMCNC: 27.3 PG — SIGNIFICANT CHANGE UP (ref 27–34)
MCHC RBC-ENTMCNC: 29.4 GM/DL — LOW (ref 32–36)
MCV RBC AUTO: 93.1 FL — SIGNIFICANT CHANGE UP (ref 80–100)
NRBC # BLD: 0 /100 WBCS — SIGNIFICANT CHANGE UP (ref 0–0)
PLATELET # BLD AUTO: 198 K/UL — SIGNIFICANT CHANGE UP (ref 150–400)
POTASSIUM SERPL-MCNC: 5.1 MMOL/L — SIGNIFICANT CHANGE UP (ref 3.5–5.3)
POTASSIUM SERPL-SCNC: 5.1 MMOL/L — SIGNIFICANT CHANGE UP (ref 3.5–5.3)
RBC # BLD: 3.62 M/UL — LOW (ref 3.8–5.2)
RBC # FLD: 16.5 % — HIGH (ref 10.3–14.5)
SODIUM SERPL-SCNC: 139 MMOL/L — SIGNIFICANT CHANGE UP (ref 135–145)
WBC # BLD: 9.73 K/UL — SIGNIFICANT CHANGE UP (ref 3.8–10.5)
WBC # FLD AUTO: 9.73 K/UL — SIGNIFICANT CHANGE UP (ref 3.8–10.5)

## 2022-10-22 PROCEDURE — 84480 ASSAY TRIIODOTHYRONINE (T3): CPT

## 2022-10-22 PROCEDURE — 84443 ASSAY THYROID STIM HORMONE: CPT

## 2022-10-22 PROCEDURE — 71045 X-RAY EXAM CHEST 1 VIEW: CPT

## 2022-10-22 PROCEDURE — 82803 BLOOD GASES ANY COMBINATION: CPT

## 2022-10-22 PROCEDURE — 85379 FIBRIN DEGRADATION QUANT: CPT

## 2022-10-22 PROCEDURE — 84484 ASSAY OF TROPONIN QUANT: CPT

## 2022-10-22 PROCEDURE — 85025 COMPLETE CBC W/AUTO DIFF WBC: CPT

## 2022-10-22 PROCEDURE — 94760 N-INVAS EAR/PLS OXIMETRY 1: CPT

## 2022-10-22 PROCEDURE — 96365 THER/PROPH/DIAG IV INF INIT: CPT

## 2022-10-22 PROCEDURE — 82272 OCCULT BLD FECES 1-3 TESTS: CPT

## 2022-10-22 PROCEDURE — 99232 SBSQ HOSP IP/OBS MODERATE 35: CPT | Mod: FS

## 2022-10-22 PROCEDURE — 71250 CT THORAX DX C-: CPT

## 2022-10-22 PROCEDURE — 97116 GAIT TRAINING THERAPY: CPT

## 2022-10-22 PROCEDURE — 84436 ASSAY OF TOTAL THYROXINE: CPT

## 2022-10-22 PROCEDURE — 83540 ASSAY OF IRON: CPT

## 2022-10-22 PROCEDURE — 96375 TX/PRO/DX INJ NEW DRUG ADDON: CPT

## 2022-10-22 PROCEDURE — 36415 COLL VENOUS BLD VENIPUNCTURE: CPT

## 2022-10-22 PROCEDURE — 83735 ASSAY OF MAGNESIUM: CPT

## 2022-10-22 PROCEDURE — 80053 COMPREHEN METABOLIC PANEL: CPT

## 2022-10-22 PROCEDURE — 86900 BLOOD TYPING SEROLOGIC ABO: CPT

## 2022-10-22 PROCEDURE — 93306 TTE W/DOPPLER COMPLETE: CPT

## 2022-10-22 PROCEDURE — 76775 US EXAM ABDO BACK WALL LIM: CPT

## 2022-10-22 PROCEDURE — 83880 ASSAY OF NATRIURETIC PEPTIDE: CPT

## 2022-10-22 PROCEDURE — 93970 EXTREMITY STUDY: CPT

## 2022-10-22 PROCEDURE — 36600 WITHDRAWAL OF ARTERIAL BLOOD: CPT

## 2022-10-22 PROCEDURE — 82607 VITAMIN B-12: CPT

## 2022-10-22 PROCEDURE — 84550 ASSAY OF BLOOD/URIC ACID: CPT

## 2022-10-22 PROCEDURE — 87635 SARS-COV-2 COVID-19 AMP PRB: CPT

## 2022-10-22 PROCEDURE — 86901 BLOOD TYPING SEROLOGIC RH(D): CPT

## 2022-10-22 PROCEDURE — 86850 RBC ANTIBODY SCREEN: CPT

## 2022-10-22 PROCEDURE — 80048 BASIC METABOLIC PNL TOTAL CA: CPT

## 2022-10-22 PROCEDURE — 83550 IRON BINDING TEST: CPT

## 2022-10-22 PROCEDURE — 82728 ASSAY OF FERRITIN: CPT

## 2022-10-22 PROCEDURE — 82746 ASSAY OF FOLIC ACID SERUM: CPT

## 2022-10-22 PROCEDURE — 82962 GLUCOSE BLOOD TEST: CPT

## 2022-10-22 PROCEDURE — 84466 ASSAY OF TRANSFERRIN: CPT

## 2022-10-22 PROCEDURE — 94640 AIRWAY INHALATION TREATMENT: CPT

## 2022-10-22 PROCEDURE — 93005 ELECTROCARDIOGRAM TRACING: CPT

## 2022-10-22 PROCEDURE — 99285 EMERGENCY DEPT VISIT HI MDM: CPT

## 2022-10-22 PROCEDURE — 97162 PT EVAL MOD COMPLEX 30 MIN: CPT

## 2022-10-22 PROCEDURE — 85027 COMPLETE CBC AUTOMATED: CPT

## 2022-10-22 RX ORDER — LEVOTHYROXINE SODIUM 125 MCG
1 TABLET ORAL
Qty: 0 | Refills: 0 | DISCHARGE
Start: 2022-10-22

## 2022-10-22 RX ORDER — IPRATROPIUM/ALBUTEROL SULFATE 18-103MCG
3 AEROSOL WITH ADAPTER (GRAM) INHALATION
Qty: 0 | Refills: 0 | DISCHARGE
Start: 2022-10-22

## 2022-10-22 RX ORDER — PREGABALIN 225 MG/1
1 CAPSULE ORAL
Qty: 0 | Refills: 0 | DISCHARGE

## 2022-10-22 RX ORDER — PANTOPRAZOLE SODIUM 20 MG/1
1 TABLET, DELAYED RELEASE ORAL
Qty: 0 | Refills: 0 | DISCHARGE

## 2022-10-22 RX ORDER — FUROSEMIDE 40 MG
1 TABLET ORAL
Qty: 30 | Refills: 0
Start: 2022-10-22 | End: 2022-11-20

## 2022-10-22 RX ORDER — METOPROLOL TARTRATE 50 MG
1 TABLET ORAL
Qty: 0 | Refills: 0 | DISCHARGE

## 2022-10-22 RX ORDER — METOPROLOL TARTRATE 50 MG
3 TABLET ORAL
Qty: 0 | Refills: 0 | DISCHARGE
Start: 2022-10-22

## 2022-10-22 RX ORDER — PANTOPRAZOLE SODIUM 20 MG/1
1 TABLET, DELAYED RELEASE ORAL
Qty: 0 | Refills: 0 | DISCHARGE
Start: 2022-10-22

## 2022-10-22 RX ORDER — PREGABALIN 225 MG/1
1 CAPSULE ORAL
Qty: 0 | Refills: 0 | DISCHARGE
Start: 2022-10-22

## 2022-10-22 RX ORDER — MIRTAZAPINE 45 MG/1
1 TABLET, ORALLY DISINTEGRATING ORAL
Qty: 0 | Refills: 0 | DISCHARGE

## 2022-10-22 RX ORDER — CHOLECALCIFEROL (VITAMIN D3) 125 MCG
1000 CAPSULE ORAL
Qty: 0 | Refills: 0 | DISCHARGE
Start: 2022-10-22

## 2022-10-22 RX ORDER — CHOLECALCIFEROL (VITAMIN D3) 125 MCG
1 CAPSULE ORAL
Qty: 0 | Refills: 0 | DISCHARGE

## 2022-10-22 RX ORDER — ATORVASTATIN CALCIUM 80 MG/1
1 TABLET, FILM COATED ORAL
Qty: 0 | Refills: 0 | DISCHARGE
Start: 2022-10-22

## 2022-10-22 RX ORDER — LEVOTHYROXINE SODIUM 125 MCG
1 TABLET ORAL
Qty: 0 | Refills: 0 | DISCHARGE

## 2022-10-22 RX ORDER — CLOPIDOGREL BISULFATE 75 MG/1
1 TABLET, FILM COATED ORAL
Qty: 0 | Refills: 0 | DISCHARGE
Start: 2022-10-22

## 2022-10-22 RX ORDER — ATORVASTATIN CALCIUM 80 MG/1
1 TABLET, FILM COATED ORAL
Qty: 0 | Refills: 0 | DISCHARGE

## 2022-10-22 RX ORDER — MIRTAZAPINE 45 MG/1
1 TABLET, ORALLY DISINTEGRATING ORAL
Qty: 0 | Refills: 0 | DISCHARGE
Start: 2022-10-22

## 2022-10-22 RX ORDER — LOSARTAN POTASSIUM 100 MG/1
1 TABLET, FILM COATED ORAL
Qty: 0 | Refills: 0 | DISCHARGE

## 2022-10-22 RX ORDER — POLYETHYLENE GLYCOL 3350 17 G/17G
17 POWDER, FOR SOLUTION ORAL
Qty: 0 | Refills: 0 | DISCHARGE
Start: 2022-10-22

## 2022-10-22 RX ADMIN — HEPARIN SODIUM 5000 UNIT(S): 5000 INJECTION INTRAVENOUS; SUBCUTANEOUS at 06:02

## 2022-10-22 RX ADMIN — CLOPIDOGREL BISULFATE 75 MILLIGRAM(S): 75 TABLET, FILM COATED ORAL at 12:24

## 2022-10-22 RX ADMIN — Medication 3 MILLILITER(S): at 09:54

## 2022-10-22 RX ADMIN — POLYETHYLENE GLYCOL 3350 17 GRAM(S): 17 POWDER, FOR SOLUTION ORAL at 12:24

## 2022-10-22 RX ADMIN — Medication 1000 UNIT(S): at 12:24

## 2022-10-22 RX ADMIN — Medication 125 MICROGRAM(S): at 06:02

## 2022-10-22 RX ADMIN — PANTOPRAZOLE SODIUM 40 MILLIGRAM(S): 20 TABLET, DELAYED RELEASE ORAL at 06:02

## 2022-10-22 RX ADMIN — Medication 325 MILLIGRAM(S): at 10:27

## 2022-10-22 RX ADMIN — PREGABALIN 1000 MICROGRAM(S): 225 CAPSULE ORAL at 12:24

## 2022-10-22 RX ADMIN — Medication 75 MILLIGRAM(S): at 06:01

## 2022-10-22 NOTE — PROGRESS NOTE ADULT - ASSESSMENT
Hemodynamic/cardio-renal MARGARET/CKD 3 (Cr 1.02 - 7/19/22)  Improved renal indices. To continue current meds. Low potassium diet.   Lokelma PRN. Will follow electrolytes and renal function trend.

## 2022-10-22 NOTE — PROGRESS NOTE ADULT - SUBJECTIVE AND OBJECTIVE BOX
Patient is a 89y old  Female who presents with a chief complaint of SOB (22 Oct 2022 07:09)    Patient seen in follow up for MARGARET.        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    Immunization, BCG    left Rotator Cuff tear 1996    DM (diabetes mellitus)    HTN (hypertension)    HLD (hyperlipidemia)      MEDICATIONS  (STANDING):  albuterol/ipratropium for Nebulization 3 milliLiter(s) Nebulizer every 6 hours  atorvastatin 80 milliGRAM(s) Oral at bedtime  cholecalciferol 1000 Unit(s) Oral daily  clopidogrel Tablet 75 milliGRAM(s) Oral daily  cyanocobalamin 1000 MICROGram(s) Oral daily  ferrous sulfate Oral Tab/Cap - Peds 325 milliGRAM(s) Oral two times a day with meals  heparin   Injectable 5000 Unit(s) SubCutaneous every 8 hours  levothyroxine 125 MICROGram(s) Oral daily  metoprolol succinate ER 75 milliGRAM(s) Oral daily  mirtazapine 15 milliGRAM(s) Oral at bedtime  pantoprazole    Tablet 40 milliGRAM(s) Oral before breakfast  polyethylene glycol 3350 17 Gram(s) Oral daily    MEDICATIONS  (PRN):  acetaminophen     Tablet .. 650 milliGRAM(s) Oral every 6 hours PRN Temp greater or equal to 38C (100.4F), Mild Pain (1 - 3)  aluminum hydroxide/magnesium hydroxide/simethicone Suspension 30 milliLiter(s) Oral every 4 hours PRN Dyspepsia  melatonin 3 milliGRAM(s) Oral at bedtime PRN Insomnia  ondansetron Injectable 4 milliGRAM(s) IV Push every 8 hours PRN Nausea and/or Vomiting    T(C): 36.4 (10-22-22 @ 06:37), Max: 36.9 (10-21-22 @ 05:41)  HR: 68 (10-22-22 @ 10:00) (68 - 78)  BP: 166/67 (10-22-22 @ 07:10) (116/56 - 189/65)  RR: 17 (10-22-22 @ 06:37) (16 - 17)  SpO2: 98% (10-22-22 @ 10:00) (91% - 98%)  Wt(kg): --  I&O's Detail    21 Oct 2022 07:01  -  22 Oct 2022 07:00  --------------------------------------------------------  IN:  Total IN: 0 mL    OUT:    Voided (mL): 600 mL  Total OUT: 600 mL    Total NET: -600 mL          PHYSICAL EXAM:  General: No distress  Respiratory: b/l air entry  Cardiovascular: S1 S2  Gastrointestinal: soft  Extremities: no edema                              9.9    9.73  )-----------( 198      ( 22 Oct 2022 06:30 )             33.7     10-22    139  |  104  |  48<H>  ----------------------------<  97  5.1   |  28  |  1.41<H>    Ca    9.2      22 Oct 2022 06:30                Sodium, Serum: 139 (10-22 @ 06:30)  Sodium, Serum: 143 (10-21 @ 06:50)  Sodium, Serum: 139 (10-20 @ 07:37)  Sodium, Serum: 141 (10-19 @ 07:00)    Creatinine, Serum: 1.41 (10-22 @ 06:30)  Creatinine, Serum: 1.78 (10-21 @ 06:50)  Creatinine, Serum: 2.04 (10-20 @ 07:37)  Creatinine, Serum: 1.96 (10-19 @ 07:00)    Potassium, Serum: 5.1 (10-22 @ 06:30)  Potassium, Serum: 4.6 (10-21 @ 06:50)  Potassium, Serum: 4.7 (10-20 @ 07:37)  Potassium, Serum: 4.3 (10-19 @ 07:00)    Hemoglobin: 9.9 (10-22 @ 06:30)  Hemoglobin: 8.8 (10-21 @ 06:50)  Hemoglobin: 9.4 (10-20 @ 07:37)  Hemoglobin: 8.1 (10-19 @ 07:00)

## 2022-10-22 NOTE — PROGRESS NOTE ADULT - NUTRITIONAL ASSESSMENT
This patient has been assessed with a concern for Malnutrition and has been determined to have a diagnosis/diagnoses of Moderate protein-calorie malnutrition.    This patient is being managed with:   Diet DASH/TLC-  Sodium & Cholesterol Restricted  Entered: Oct 13 2022  3:32PM    

## 2022-10-22 NOTE — PROGRESS NOTE ADULT - NS ATTEND OPT1 GEN_ALL_CORE
I attest my time as attending is greater than 50% of the total combined time spent on qualifying patient care activities by the PA/NP and attending.
I independently performed the documented:

## 2022-10-22 NOTE — DISCHARGE NOTE NURSING/CASE MANAGEMENT/SOCIAL WORK - NSDCVIVACCINE_GEN_ALL_CORE_FT
Tdap; 31-Aug-2017 09:27; Cheri Og); [employer]; [id number]; IntraMuscular; Deltoid Left.; 0.5 milliLiter(s); VIS (VIS Published: 09-May-2013, VIS Presented: 31-Aug-2017);

## 2022-10-22 NOTE — PROGRESS NOTE ADULT - ASSESSMENT
89 year old F PMH CAD s/p CABG x3 (5/2010), DM type 2, HTN, HLD presenting to the ED from [facility] for SOB and bilateral LE edema admitted for SOB    # Acute Hypoxic Respiratory Failure due to probable Acute on chronic Diastolic CHF  TTE showed preserved EF, grade 2 DD, mild LVH, mod enlarged RV  CXR on 10/23 showed cardiomegaly w/o gross airspace consolidation or effusion, BNP 8680  maintain supplemental oxygen, wean as tolerated (pt is not on home oxygen)  Pro BNP downtrending, repeat CXR with scant effusions, inc SCr after IV lasix  pt likely euvolemic, transition to lasix 40 po daily, renal evaluation appreciated  stopped ARB, hold lasix 24-48 hours  strict I/Os, daily weights  continue duonebs, incentive spirometer  stopped steroid taper because does not have COPD  PT recommends [facility]- Pt accept at [facility]     # MARGARET on CKD 3  renal following - recs appreciated   hemodynamic/cardio renal MARGARET (Cr 1.02 - 7/19/22)  avoid ACE/ARB (stopped ARB)  no overt volume overload at this time  hold lasix 24-48 hours  follow BMP    # E Coli UTI  # Fever  pt completed course of IV Rocephin for UTI, no Fever  monitor labs    # Anemia with Iron Deficiency  s/p iron x 2 doses  FOBT negative  monitor CBC  transfuse Hgb < 8 or is symptomatic    # CAD s/p CABG  # HTN  continue home plavix, atorvastatin  stopped losartan, continue  metoprolol xl 75 mg daily as per cardiology-   NST may be beneficial to evaluate for ischemia, but pt and family prefer conservative mngmt    # Hypothyroid  continue levothyroxine 125 mcg daily    # DVT PPX  heparin    # Disposition  anticipate dc [facility] 24-48 hours pending cardiology and renal clearance  10/21 updated son Khris [phone]  89 year old F PMH CAD s/p CABG x3 (5/2010), DM type 2, HTN, HLD presenting to the ED from [facility] for SOB and bilateral LE edema admitted for SOB- Diastolic Heart Failure     # Acute Hypoxic Respiratory Failure due to probable Acute on chronic Diastolic CHF  TTE showed preserved EF, grade 2 DD, mild LVH, mod enlarged RV  CXR on 10/23 showed cardiomegaly w/o gross airspace consolidation or effusion, BNP 8680  maintain supplemental oxygen, wean as tolerated (pt is not on home oxygen)  Pro BNP downtrending, repeat CXR with scant effusions, inc SCr after IV lasix  pt likely euvolemic,  resume Lasix at 20mg daily   no ACE/  ARB, - as per renal   continue duonebs, incentive spirometer  PT recommends [facility] Pt accept at [facility]     # MARGARET on CKD 3  renal following - recs appreciated   hemodynamic/cardio renal MARGARET (Cr 1.02 - 7/19/22)  avoid ACE/ARB (stopped ARB)  no overt volume overload at this time  ok to resume diuretics  follow BMP at facility     # E Coli UTI- resolved   # Fever- resolved  pt completed course of IV Rocephin for UTI, no Fever    # Anemia with Iron Deficiency  s/p iron x 2 doses  FOBT negative  monitor CBC  transfuse Hgb < 8 or is symptomatic    # CAD s/p CABG  # HTN  continue home plavix, atorvastatin  stopped losartan, continue  metoprolol xl 75 mg daily as per cardiology-   NST may be beneficial to evaluate for ischemia, but pt and family prefer conservative mngmt    # Hypothyroid  continue levothyroxine 125 mcg daily    # DVT PPX  heparin    # Disposition  discharge to [facility] - resume low dose diuretic - 20mg daily  follow BMP at daily  and weights   10/22 updated son Khris [phone]

## 2022-10-22 NOTE — PROGRESS NOTE ADULT - PROVIDER SPECIALTY LIST ADULT
Cardiology
Hospitalist
Hospitalist
Nephrology
Cardiology
Hospitalist
Nephrology
Hospitalist
Nephrology
Hospitalist
Hospitalist

## 2022-10-22 NOTE — PROGRESS NOTE ADULT - SUBJECTIVE AND OBJECTIVE BOX
Patient is a 89y old  Female who presents with a chief complaint of SOB (21 Oct 2022 19:01)      Patient seen and examined at bedside.    ALLERGIES:  No Known Allergies    MEDICATIONS  (STANDING):  albuterol/ipratropium for Nebulization 3 milliLiter(s) Nebulizer every 6 hours  atorvastatin 80 milliGRAM(s) Oral at bedtime  cholecalciferol 1000 Unit(s) Oral daily  clopidogrel Tablet 75 milliGRAM(s) Oral daily  cyanocobalamin 1000 MICROGram(s) Oral daily  ferrous sulfate Oral Tab/Cap - Peds 325 milliGRAM(s) Oral two times a day with meals  heparin   Injectable 5000 Unit(s) SubCutaneous every 8 hours  levothyroxine 125 MICROGram(s) Oral daily  metoprolol succinate ER 75 milliGRAM(s) Oral daily  mirtazapine 15 milliGRAM(s) Oral at bedtime  pantoprazole    Tablet 40 milliGRAM(s) Oral before breakfast  polyethylene glycol 3350 17 Gram(s) Oral daily    MEDICATIONS  (PRN):  acetaminophen     Tablet .. 650 milliGRAM(s) Oral every 6 hours PRN Temp greater or equal to 38C (100.4F), Mild Pain (1 - 3)  aluminum hydroxide/magnesium hydroxide/simethicone Suspension 30 milliLiter(s) Oral every 4 hours PRN Dyspepsia  melatonin 3 milliGRAM(s) Oral at bedtime PRN Insomnia  ondansetron Injectable 4 milliGRAM(s) IV Push every 8 hours PRN Nausea and/or Vomiting    Vital Signs Last 24 Hrs  T(F): 97.6 (22 Oct 2022 06:37), Max: 98.1 (21 Oct 2022 21:01)  HR: 78 (22 Oct 2022 06:37) (75 - 78)  BP: 189/65 (22 Oct 2022 06:37) (116/56 - 189/65)  RR: 17 (22 Oct 2022 06:37) (16 - 17)  SpO2: 97% (22 Oct 2022 06:37) (91% - 97%)  I&O's Summary    21 Oct 2022 07:01  -  22 Oct 2022 07:00  --------------------------------------------------------  IN: 0 mL / OUT: 600 mL / NET: -600 mL      PHYSICAL EXAM:  General: NAD, A/O x 3  ENT: MMM  Neck: Supple, No JVD  Lungs: Clear to auscultation bilaterally, Non labored breathing   Cardio: RRR, S1/S2, No murmurs  Abdomen: Soft, Nontender, Nondistended; Bowel sounds present  Extremities: No calf tenderness, No pitting edema    LABS:                        8.8    8.79  )-----------( 316      ( 21 Oct 2022 06:50 )             29.6     10-21    143  |  106  |  54  ----------------------------<  119  4.6   |  34  |  1.78    Ca    8.7      21 Oct 2022 06:50  Mg     2.4     10-20                                      COVID-19 PCR: NotDetec (10-20-22 @ 06:23)  COVID-19 PCR: NotDetec (10-13-22 @ 12:20)    RADIOLOGY & ADDITIONAL TESTS:    Care Discussed with Consultants/Other Providers:    Patient is a 89y old  Female who presents with a chief complaint of SOB (21 Oct 2022 19:01)      Patient seen and examined at bedside.  Pt without complaints, no events overnight.      ALLERGIES:  No Known Allergies    MEDICATIONS  (STANDING):  albuterol/ipratropium for Nebulization 3 milliLiter(s) Nebulizer every 6 hours  atorvastatin 80 milliGRAM(s) Oral at bedtime  cholecalciferol 1000 Unit(s) Oral daily  clopidogrel Tablet 75 milliGRAM(s) Oral daily  cyanocobalamin 1000 MICROGram(s) Oral daily  ferrous sulfate Oral Tab/Cap - Peds 325 milliGRAM(s) Oral two times a day with meals  heparin   Injectable 5000 Unit(s) SubCutaneous every 8 hours  levothyroxine 125 MICROGram(s) Oral daily  metoprolol succinate ER 75 milliGRAM(s) Oral daily  mirtazapine 15 milliGRAM(s) Oral at bedtime  pantoprazole    Tablet 40 milliGRAM(s) Oral before breakfast  polyethylene glycol 3350 17 Gram(s) Oral daily    MEDICATIONS  (PRN):  acetaminophen     Tablet .. 650 milliGRAM(s) Oral every 6 hours PRN Temp greater or equal to 38C (100.4F), Mild Pain (1 - 3)  aluminum hydroxide/magnesium hydroxide/simethicone Suspension 30 milliLiter(s) Oral every 4 hours PRN Dyspepsia  melatonin 3 milliGRAM(s) Oral at bedtime PRN Insomnia  ondansetron Injectable 4 milliGRAM(s) IV Push every 8 hours PRN Nausea and/or Vomiting    Vital Signs Last 24 Hrs  T(F): 97.6 (22 Oct 2022 06:37), Max: 98.1 (21 Oct 2022 21:01)  HR: 78 (22 Oct 2022 06:37) (75 - 78)  BP: 189/65 (22 Oct 2022 06:37) (116/56 - 189/65)  RR: 17 (22 Oct 2022 06:37) (16 - 17)  SpO2: 97% (22 Oct 2022 06:37) (91% - 97%)  I&O's Summary    21 Oct 2022 07:01  -  22 Oct 2022 07:00  --------------------------------------------------------  IN: 0 mL / OUT: 600 mL / NET: -600 mL      PHYSICAL EXAM:  General:89 y/o female in  NAD, A/O x 2-3   ENT: MMM  Neck: Supple, No JVD  Lungs: Clear to auscultation bilaterally, Non labored breathing   Cardio: RRR, S1/S2, No murmurs  Abdomen: Soft, Nontender, Nondistended; Bowel sounds present  Extremities: No calf tenderness, No pitting edema    LABS:                        8.8    8.79  )-----------( 316      ( 21 Oct 2022 06:50 )             29.6     10-21    143  |  106  |  54  ----------------------------<  119  4.6   |  34  |  1.78    Ca    8.7      21 Oct 2022 06:50  Mg     2.4     10-20                                      COVID-19 PCR: NotDetec (10-20-22 @ 06:23)  COVID-19 PCR: NotDetec (10-13-22 @ 12:20)    RADIOLOGY & ADDITIONAL TESTS:    Care Discussed with Consultants/Other Providers:    Patient is a 89y old  Female who presents with a chief complaint of SOB (21 Oct 2022 19:01)        Patient seen and examined at bedside.  Pt without complaints, no events overnight.      ALLERGIES:  No Known Allergies    MEDICATIONS  (STANDING):  albuterol/ipratropium for Nebulization 3 milliLiter(s) Nebulizer every 6 hours  atorvastatin 80 milliGRAM(s) Oral at bedtime  cholecalciferol 1000 Unit(s) Oral daily  clopidogrel Tablet 75 milliGRAM(s) Oral daily  cyanocobalamin 1000 MICROGram(s) Oral daily  ferrous sulfate Oral Tab/Cap - Peds 325 milliGRAM(s) Oral two times a day with meals  heparin   Injectable 5000 Unit(s) SubCutaneous every 8 hours  levothyroxine 125 MICROGram(s) Oral daily  metoprolol succinate ER 75 milliGRAM(s) Oral daily  mirtazapine 15 milliGRAM(s) Oral at bedtime  pantoprazole    Tablet 40 milliGRAM(s) Oral before breakfast  polyethylene glycol 3350 17 Gram(s) Oral daily    MEDICATIONS  (PRN):  acetaminophen     Tablet .. 650 milliGRAM(s) Oral every 6 hours PRN Temp greater or equal to 38C (100.4F), Mild Pain (1 - 3)  aluminum hydroxide/magnesium hydroxide/simethicone Suspension 30 milliLiter(s) Oral every 4 hours PRN Dyspepsia  melatonin 3 milliGRAM(s) Oral at bedtime PRN Insomnia  ondansetron Injectable 4 milliGRAM(s) IV Push every 8 hours PRN Nausea and/or Vomiting    Vital Signs Last 24 Hrs  T(F): 97.6 (22 Oct 2022 06:37), Max: 98.1 (21 Oct 2022 21:01)  HR: 78 (22 Oct 2022 06:37) (75 - 78)  BP: 189/65 (22 Oct 2022 06:37) (116/56 - 189/65)  RR: 17 (22 Oct 2022 06:37) (16 - 17)  SpO2: 97% (22 Oct 2022 06:37) (91% - 97%)  I&O's Summary    21 Oct 2022 07:01  -  22 Oct 2022 07:00  --------------------------------------------------------  IN: 0 mL / OUT: 600 mL / NET: -600 mL      PHYSICAL EXAM:  General:89 y/o female in  NAD, A/O x 2-3   ENT: MMM  Neck: Supple, No JVD  Lungs: Clear to auscultation bilaterally, Non labored breathing   Cardio: RRR, S1/S2, No murmurs  Abdomen: Soft, Nontender, Nondistended; Bowel sounds present  Extremities: No calf tenderness, No pitting edema    LABS:                        8.8    8.79  )-----------( 316      ( 21 Oct 2022 06:50 )             29.6     10-21    143  |  106  |  54  ----------------------------<  119  4.6   |  34  |  1.78    Ca    8.7      21 Oct 2022 06:50  Mg     2.4     10-20                                      COVID-19 PCR: NotDetec (10-20-22 @ 06:23)  COVID-19 PCR: NotDetec (10-13-22 @ 12:20)    RADIOLOGY & ADDITIONAL TESTS:    Care Discussed with Consultants/Other Providers:

## 2022-10-22 NOTE — DISCHARGE NOTE NURSING/CASE MANAGEMENT/SOCIAL WORK - PATIENT PORTAL LINK FT
You can access the FollowMyHealth Patient Portal offered by [facility] by registering at the following website: [email or web address]. By joining [facility]’s FollowMyHealth portal, you will also be able to view your health information using other applications (apps) compatible with our system.

## 2022-11-02 NOTE — ED CDU PROVIDER INITIAL DAY NOTE - OBJECTIVE STATEMENT
Caller would like to discuss Patient is asking for order to get a wheelchair. Patient will go through [facility]. Writer advised caller of callback within 24-72 hours.    Patient Name: Dayton Hart  Caller Name: Dayton  Name of Facility:   [facility] Response: N/A  Callback Number: [phone]  Best Availability: anytime  Can A Detailed Message Be left? yes  Fax Number:   Additional Info:   Did you confirm the message with the caller?: yes    Thank you,  Megan Soares   89F PMH HTN, HLD, DM2, hypothyroidism who is BIBA for stroke-like sx. Pt was eating dinner at her facility when at 5:35pm she began to have drooling at the mouth, facial droop, slurred speech and was c/o arm pain. Episode was witnessed by staff. Per EMS, they noted some L-sided arm weakness on arrival. Pt states sx are better upon arrival to ED. CODE STROKE called upon arrival in ED.  In ED pts symptoms resolved. workup grossly unremarkable for acute pathology. neurology recommended loading dose of Plavix, continue pts daily ASA, CDU for MRI and neuro checks.

## 2022-12-06 NOTE — ED ADULT NURSE NOTE - ISAR SCORE
For information on Fall & Injury Prevention, visit: [email or web address] OR  [email or web address] OR  https://www.cdc.gov/steadi/patient.html 1
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
## 2024-01-08 ENCOUNTER — EMERGENCY (EMERGENCY)
Facility: HOSPITAL | Age: 89
LOS: 1 days | Discharge: HOME | End: 2024-01-08
Attending: EMERGENCY MEDICINE | Admitting: EMERGENCY MEDICINE
Payer: MEDICARE

## 2024-01-08 VITALS
OXYGEN SATURATION: 95 % | DIASTOLIC BLOOD PRESSURE: 76 MMHG | TEMPERATURE: 98 F | HEART RATE: 89 BPM | SYSTOLIC BLOOD PRESSURE: 142 MMHG | RESPIRATION RATE: 22 BRPM

## 2024-01-08 PROCEDURE — 99285 EMERGENCY DEPT VISIT HI MDM: CPT

## 2024-01-08 NOTE — ED ADULT NURSE NOTE - CAS EDN DISCHARGE INTERVENTIONS
Medication(s) Requested:   ALPRAZolam ER (ALPRAZOLAM XR) 2 MG extended release tablet 30 tablet 0 7/29/2019     Sig - Route: Take 1 tablet by mouth daily. Do not start before July 29, 2019. - Oral    Sent to pharmacy as: ALPRAZolam ER 2 MG Oral Tablet Extended Release 24 Hour    Class: Eprescribe    E-Prescribing Status: Receipt confirmed by pharmacy (7/24/2019 12:51 PM CDT)      ALPRAZolam (XANAX) 1 MG tablet 30 tablet 0 7/29/2019     Sig: Do not start before July 29, 2019. Take 1 tablet qhs and up to 0.5 mg as needed once per day    Sent to pharmacy as: ALPRAZolam 1 MG Oral Tablet    Class: Eprescribe    E-Prescribing Status: Receipt confirmed by pharmacy (7/24/2019 12:51 PM CDT)          Last office visit: 8/5/2019  Advised follow up: 1-2 months  Appointment: 9/5/2019  Last refill: 7/29/2019  Is the patient due for refill of this medication(s): Yes 8/28/19  PDMP review: Criteria not met because PDMP ALERTS. Forwarded to Physician/VITALIY for further review and decision on medication(s) requested. Is it ok to refill Rx with fill date 8/28/19?  Ruy
Provider e-prescribed prescription to the pharmacy.
IV discontinued, cath removed intact

## 2024-01-08 NOTE — ED ADULT TRIAGE NOTE - CHIEF COMPLAINT QUOTE
Patient BIBEMS from [facility] for cough and SOB x24 hours. Wheezing in triage. Hx COPD. Patient BIBEMS from [facility] for cough and SOB x24 hours. Wheezing in triage. Hx COPD.

## 2024-01-08 NOTE — ED ADULT NURSE NOTE - OBJECTIVE STATEMENT
Patient arrives A&Ox4, BIBEMS from [facility]. Per EMS, new onset of "SOB and wheezing" x24 hours. Also endorsing dry cough. Hx of COPD. Patient mildly tachypneic however no acute distress noted. Cardiac monitor applied. Side rails up, call light in reach, safety maintained and MD evaluation in progress. Patient arrives A&Ox4, BIBEMS from [facility]. Per EMS, new onset of "SOB and wheezing" x24 hours. Also endorsing dry cough. Hx of COPD. Patient mildly tachypneic however no acute distress noted. Cardiac monitor applied. Side rails up, call light in reach, safety maintained and MD evaluation in progress.

## 2024-01-08 NOTE — ED ADULT NURSE NOTE - CHIEF COMPLAINT QUOTE
Patient BIBEMS from [facility] for cough and SOB x24 hours. Wheezing in triage. Hx COPD. Patient BIBEMS from [facility] for cough and SOB x24 hours. Wheezing in triage. Hx COPD.

## 2024-01-08 NOTE — ED ADULT NURSE NOTE - NSFALLRISKINTERV_ED_ALL_ED
Assistance OOB with selected safe patient handling equipment if applicable/Assistance with ambulation/Communicate fall risk and risk factors to all staff, patient, and family/Monitor gait and stability/Provide patient with walking aids/Provide visual cue: yellow wristband, yellow gown, etc/Reinforce activity limits and safety measures with patient and family/Call bell, personal items and telephone in reach/Instruct patient to call for assistance before getting out of bed/chair/stretcher/Non-slip footwear applied when patient is off stretcher/[place] to call system/Physically safe environment - no spills, clutter or unnecessary equipment/Purposeful Proactive Rounding/Room/bathroom lighting operational, light cord in reach Assistance OOB with selected safe patient handling equipment if applicable/Assistance with ambulation/Communicate fall risk and risk factors to all staff, patient, and family/Monitor gait and stability/Provide patient with walking aids/Provide visual cue: yellow wristband, yellow gown, etc/Reinforce activity limits and safety measures with patient and family/Call bell, personal items and telephone in reach/Instruct patient to call for assistance before getting out of bed/chair/stretcher/Non-slip footwear applied when patient is off stretcher/Counselor to call system/Physically safe environment - no spills, clutter or unnecessary equipment/Purposeful Proactive Rounding/Room/bathroom lighting operational, light cord in reach

## 2024-01-08 NOTE — ED ADULT NURSE NOTE - MODE OF DISCHARGE
Peng Advancement Flap Text: The defect edges were debeveled with a #15 scalpel blade.  Given the location of the defect, shape of the defect and the proximity to free margins a Peng advancement flap was deemed most appropriate.  Using a sterile surgical marker, an appropriate advancement flap was drawn incorporating the defect and placing the expected incisions within the relaxed skin tension lines where possible. The area thus outlined was incised deep to adipose tissue with a #15 scalpel blade.  The skin margins were undermined to an appropriate distance in all directions utilizing iris scissors. Stretcher

## 2024-01-09 VITALS
TEMPERATURE: 98 F | OXYGEN SATURATION: 93 % | RESPIRATION RATE: 20 BRPM | DIASTOLIC BLOOD PRESSURE: 54 MMHG | HEART RATE: 73 BPM | SYSTOLIC BLOOD PRESSURE: 156 MMHG

## 2024-01-09 LAB
ALBUMIN SERPL ELPH-MCNC: 3.1 G/DL — LOW (ref 3.3–5)
ALBUMIN SERPL ELPH-MCNC: 3.1 G/DL — LOW (ref 3.3–5)
ALP SERPL-CCNC: 127 U/L — HIGH (ref 40–120)
ALP SERPL-CCNC: 127 U/L — HIGH (ref 40–120)
ALT FLD-CCNC: 17 U/L — SIGNIFICANT CHANGE UP (ref 10–45)
ALT FLD-CCNC: 17 U/L — SIGNIFICANT CHANGE UP (ref 10–45)
ANION GAP SERPL CALC-SCNC: 11 MMOL/L — SIGNIFICANT CHANGE UP (ref 5–17)
ANION GAP SERPL CALC-SCNC: 11 MMOL/L — SIGNIFICANT CHANGE UP (ref 5–17)
AST SERPL-CCNC: 26 U/L — SIGNIFICANT CHANGE UP (ref 10–40)
AST SERPL-CCNC: 26 U/L — SIGNIFICANT CHANGE UP (ref 10–40)
BASOPHILS # BLD AUTO: 0.04 K/UL — SIGNIFICANT CHANGE UP (ref 0–0.2)
BASOPHILS # BLD AUTO: 0.04 K/UL — SIGNIFICANT CHANGE UP (ref 0–0.2)
BASOPHILS NFR BLD AUTO: 0.7 % — SIGNIFICANT CHANGE UP (ref 0–2)
BASOPHILS NFR BLD AUTO: 0.7 % — SIGNIFICANT CHANGE UP (ref 0–2)
BILIRUB SERPL-MCNC: 0.7 MG/DL — SIGNIFICANT CHANGE UP (ref 0.2–1.2)
BILIRUB SERPL-MCNC: 0.7 MG/DL — SIGNIFICANT CHANGE UP (ref 0.2–1.2)
BUN SERPL-MCNC: 46 MG/DL — HIGH (ref 7–23)
BUN SERPL-MCNC: 46 MG/DL — HIGH (ref 7–23)
CALCIUM SERPL-MCNC: 9.3 MG/DL — SIGNIFICANT CHANGE UP (ref 8.4–10.5)
CALCIUM SERPL-MCNC: 9.3 MG/DL — SIGNIFICANT CHANGE UP (ref 8.4–10.5)
CHLORIDE SERPL-SCNC: 104 MMOL/L — SIGNIFICANT CHANGE UP (ref 96–108)
CHLORIDE SERPL-SCNC: 104 MMOL/L — SIGNIFICANT CHANGE UP (ref 96–108)
CO2 SERPL-SCNC: 26 MMOL/L — SIGNIFICANT CHANGE UP (ref 22–31)
CO2 SERPL-SCNC: 26 MMOL/L — SIGNIFICANT CHANGE UP (ref 22–31)
CREAT SERPL-MCNC: 1.56 MG/DL — HIGH (ref 0.5–1.3)
CREAT SERPL-MCNC: 1.56 MG/DL — HIGH (ref 0.5–1.3)
EGFR: 31 ML/MIN/1.73M2 — LOW
EGFR: 31 ML/MIN/1.73M2 — LOW
EOSINOPHIL # BLD AUTO: 0.16 K/UL — SIGNIFICANT CHANGE UP (ref 0–0.5)
EOSINOPHIL # BLD AUTO: 0.16 K/UL — SIGNIFICANT CHANGE UP (ref 0–0.5)
EOSINOPHIL NFR BLD AUTO: 2.7 % — SIGNIFICANT CHANGE UP (ref 0–6)
EOSINOPHIL NFR BLD AUTO: 2.7 % — SIGNIFICANT CHANGE UP (ref 0–6)
FLUAV AG NPH QL: SIGNIFICANT CHANGE UP
FLUAV AG NPH QL: SIGNIFICANT CHANGE UP
FLUBV AG NPH QL: SIGNIFICANT CHANGE UP
FLUBV AG NPH QL: SIGNIFICANT CHANGE UP
GLUCOSE SERPL-MCNC: 104 MG/DL — HIGH (ref 70–99)
GLUCOSE SERPL-MCNC: 104 MG/DL — HIGH (ref 70–99)
HCT VFR BLD CALC: 31.7 % — LOW (ref 34.5–45)
HCT VFR BLD CALC: 31.7 % — LOW (ref 34.5–45)
HGB BLD-MCNC: 10 G/DL — LOW (ref 11.5–15.5)
HGB BLD-MCNC: 10 G/DL — LOW (ref 11.5–15.5)
IMM GRANULOCYTES NFR BLD AUTO: 0.7 % — SIGNIFICANT CHANGE UP (ref 0–0.9)
IMM GRANULOCYTES NFR BLD AUTO: 0.7 % — SIGNIFICANT CHANGE UP (ref 0–0.9)
LYMPHOCYTES # BLD AUTO: 1.17 K/UL — SIGNIFICANT CHANGE UP (ref 1–3.3)
LYMPHOCYTES # BLD AUTO: 1.17 K/UL — SIGNIFICANT CHANGE UP (ref 1–3.3)
LYMPHOCYTES # BLD AUTO: 19.4 % — SIGNIFICANT CHANGE UP (ref 13–44)
LYMPHOCYTES # BLD AUTO: 19.4 % — SIGNIFICANT CHANGE UP (ref 13–44)
MCHC RBC-ENTMCNC: 28.9 PG — SIGNIFICANT CHANGE UP (ref 27–34)
MCHC RBC-ENTMCNC: 28.9 PG — SIGNIFICANT CHANGE UP (ref 27–34)
MCHC RBC-ENTMCNC: 31.5 GM/DL — LOW (ref 32–36)
MCHC RBC-ENTMCNC: 31.5 GM/DL — LOW (ref 32–36)
MCV RBC AUTO: 91.6 FL — SIGNIFICANT CHANGE UP (ref 80–100)
MCV RBC AUTO: 91.6 FL — SIGNIFICANT CHANGE UP (ref 80–100)
MONOCYTES # BLD AUTO: 0.76 K/UL — SIGNIFICANT CHANGE UP (ref 0–0.9)
MONOCYTES # BLD AUTO: 0.76 K/UL — SIGNIFICANT CHANGE UP (ref 0–0.9)
MONOCYTES NFR BLD AUTO: 12.6 % — SIGNIFICANT CHANGE UP (ref 2–14)
MONOCYTES NFR BLD AUTO: 12.6 % — SIGNIFICANT CHANGE UP (ref 2–14)
NEUTROPHILS # BLD AUTO: 3.86 K/UL — SIGNIFICANT CHANGE UP (ref 1.8–7.4)
NEUTROPHILS # BLD AUTO: 3.86 K/UL — SIGNIFICANT CHANGE UP (ref 1.8–7.4)
NEUTROPHILS NFR BLD AUTO: 63.9 % — SIGNIFICANT CHANGE UP (ref 43–77)
NEUTROPHILS NFR BLD AUTO: 63.9 % — SIGNIFICANT CHANGE UP (ref 43–77)
NRBC # BLD: 0 /100 WBCS — SIGNIFICANT CHANGE UP (ref 0–0)
NRBC # BLD: 0 /100 WBCS — SIGNIFICANT CHANGE UP (ref 0–0)
NT-PROBNP SERPL-SCNC: 341 PG/ML — HIGH (ref 0–300)
NT-PROBNP SERPL-SCNC: 341 PG/ML — HIGH (ref 0–300)
PLATELET # BLD AUTO: 168 K/UL — SIGNIFICANT CHANGE UP (ref 150–400)
PLATELET # BLD AUTO: 168 K/UL — SIGNIFICANT CHANGE UP (ref 150–400)
POTASSIUM SERPL-MCNC: 4.2 MMOL/L — SIGNIFICANT CHANGE UP (ref 3.5–5.3)
POTASSIUM SERPL-MCNC: 4.2 MMOL/L — SIGNIFICANT CHANGE UP (ref 3.5–5.3)
POTASSIUM SERPL-SCNC: 4.2 MMOL/L — SIGNIFICANT CHANGE UP (ref 3.5–5.3)
POTASSIUM SERPL-SCNC: 4.2 MMOL/L — SIGNIFICANT CHANGE UP (ref 3.5–5.3)
PROT SERPL-MCNC: 7.3 G/DL — SIGNIFICANT CHANGE UP (ref 6–8.3)
PROT SERPL-MCNC: 7.3 G/DL — SIGNIFICANT CHANGE UP (ref 6–8.3)
RBC # BLD: 3.46 M/UL — LOW (ref 3.8–5.2)
RBC # BLD: 3.46 M/UL — LOW (ref 3.8–5.2)
RBC # FLD: 15.7 % — HIGH (ref 10.3–14.5)
RBC # FLD: 15.7 % — HIGH (ref 10.3–14.5)
RSV RNA NPH QL NAA+NON-PROBE: SIGNIFICANT CHANGE UP
RSV RNA NPH QL NAA+NON-PROBE: SIGNIFICANT CHANGE UP
SARS-COV-2 RNA SPEC QL NAA+PROBE: SIGNIFICANT CHANGE UP
SARS-COV-2 RNA SPEC QL NAA+PROBE: SIGNIFICANT CHANGE UP
SODIUM SERPL-SCNC: 141 MMOL/L — SIGNIFICANT CHANGE UP (ref 135–145)
SODIUM SERPL-SCNC: 141 MMOL/L — SIGNIFICANT CHANGE UP (ref 135–145)
TROPONIN I, HIGH SENSITIVITY RESULT: <4 NG/L — SIGNIFICANT CHANGE UP
TROPONIN I, HIGH SENSITIVITY RESULT: <4 NG/L — SIGNIFICANT CHANGE UP
WBC # BLD: 6.03 K/UL — SIGNIFICANT CHANGE UP (ref 3.8–10.5)
WBC # BLD: 6.03 K/UL — SIGNIFICANT CHANGE UP (ref 3.8–10.5)
WBC # FLD AUTO: 6.03 K/UL — SIGNIFICANT CHANGE UP (ref 3.8–10.5)
WBC # FLD AUTO: 6.03 K/UL — SIGNIFICANT CHANGE UP (ref 3.8–10.5)

## 2024-01-09 PROCEDURE — 71045 X-RAY EXAM CHEST 1 VIEW: CPT | Mod: 26

## 2024-01-09 PROCEDURE — 36415 COLL VENOUS BLD VENIPUNCTURE: CPT

## 2024-01-09 PROCEDURE — 71045 X-RAY EXAM CHEST 1 VIEW: CPT

## 2024-01-09 PROCEDURE — 93005 ELECTROCARDIOGRAM TRACING: CPT

## 2024-01-09 PROCEDURE — 99285 EMERGENCY DEPT VISIT HI MDM: CPT | Mod: 25

## 2024-01-09 PROCEDURE — 93010 ELECTROCARDIOGRAM REPORT: CPT

## 2024-01-09 PROCEDURE — 80053 COMPREHEN METABOLIC PANEL: CPT

## 2024-01-09 PROCEDURE — 85025 COMPLETE CBC W/AUTO DIFF WBC: CPT

## 2024-01-09 PROCEDURE — 84484 ASSAY OF TROPONIN QUANT: CPT

## 2024-01-09 PROCEDURE — 83880 ASSAY OF NATRIURETIC PEPTIDE: CPT

## 2024-01-09 PROCEDURE — 87637 SARSCOV2&INF A&B&RSV AMP PRB: CPT

## 2024-01-09 NOTE — ED PROVIDER NOTE - NSFOLLOWUPINSTRUCTIONS_ED_ALL_ED_FT
Shortness of breath    Shortness of breath (dyspnea) means you have trouble breathing and could indicate a medical problem. Causes include lung disease, heart disease, low amount of red blood cells (anemia), poor physical fitness, being overweight, smoking, etc. Your health care provider today may not be able to find a cause for your shortness of breath after your exam. In this case, it is important to have a follow-up exam with your primary care physician as instructed. If medicines were prescribed, take them as directed for the full length of time directed. Refrain from tobacco products.    SEEK IMMEDIATE MEDICAL CARE IF YOU HAVE ANY OF THE FOLLOWING SYMPTOMS: worsening shortness of breath, chest pain, back pain, abdominal pain, fever, coughing up blood, lightheadedness/dizziness.     He can resume all medications and orders that you were on prior to transfer    Follow-up with your nursing home physician within the week

## 2024-01-09 NOTE — ED PROVIDER NOTE - CLINICAL SUMMARY MEDICAL DECISION MAKING FREE TEXT BOX
89-year-old female past medical history of coronary disease diabetes type 2 hypertension hypercholesterolemia presenting to the emergency [place] today with reported episode of shortness of breath and wheezing.  Currently the patient has no signs of either.  Will do workup to evaluate for infectious process such as pneumonia versus flu versus COVID as well as possible cardiac etiologies such as MI or heart failure.  There is also a possibility although low this is a COPD exacerbation.  Patient's lab vital signs are.  If workup is negative can discharge back to extended-care facility 89-year-old female past medical history of coronary disease diabetes type 2 hypertension hypercholesterolemia presenting to the emergency [place] today with reported episode of shortness of breath and wheezing.  Currently the patient has no signs of either.  Will do workup to evaluate for infectious process such as pneumonia versus flu versus COVID as well as possible cardiac etiologies such as MI or heart failure.  There is also a possibility although low this is a COPD exacerbation.  Patient's lab vital signs are.  If workup is negative can discharge back to extended-care facility

## 2024-01-09 NOTE — ED PROVIDER NOTE - PHYSICAL EXAMINATION
Vitals: I have reviewed the patients vital signs  General: nontoxic appearing  HEENT: Atraumatic, normocephalic, airway patent  Eyes: EOMI, tracking appropriately  Neck: no tracheal deviation  Chest/Lungs: no trauma, symmetric chest rise, speaking in complete sentences,  no resp distress clear to station bilaterally  Heart: skin and extremities well perfused, regular rate and rhythm  Neuro: A+Ox3, appears non focal  MSK: no deformities  Skin: no cyanosis, no jaundice   Psych:  Normal mood and affect

## 2024-01-09 NOTE — ED PROVIDER NOTE - PATIENT PORTAL LINK FT
You can access the FollowMyHealth Patient Portal offered by [facility] by registering at the following website: [email or web address]. By joining [employer]’s FollowMyHealth portal, you will also be able to view your health information using other applications (apps) compatible with our system. You can access the FollowMyHealth Patient Portal offered by [facility] by registering at the following website: [email or web address]. By joining [employer]’s FollowMyHealth portal, you will also be able to view your health information using other applications (apps) compatible with our system.

## 2024-01-09 NOTE — ED PROVIDER NOTE - PROGRESS NOTE DETAILS
Patient remained asymptomatic here in the emergency department.  There were no findings on the laboratory studies or imaging that would explain with this transient episode of shortness of breath was.  She is stable for return to her rehab facility

## 2024-01-09 NOTE — ED PROVIDER NOTE - OBJECTIVE STATEMENT
89-year-old female past medical history of COPD in the atherosclerotic heart disease hypertension heart failure presenting to the emergency department today from [facility] for 1 day of reported shortness of breath and wheezing.  The patient herself said that she felt short of breath earlier but has none at this point in time.  States she is ready to go back.  No fevers or cough reported.  Denies any lower extremity swelling.  Documentation from the facility does not show anything that says the patient is not alert and oriented.  I attempted to contact the emergency multiple times however kept getting a voicemail.  So collateral was unable to be obtained. 89-year-old female past medical history of COPD in the atherosclerotic heart disease hypertension heart failure presenting to the emergency department today from [facility] for 1 day of reported shortness of breath and wheezing.  The patient herself said that she felt short of breath earlier but has none at this point in time.  States she is ready to go back.  No fevers or cough reported.  Denies any lower extremity swelling.  Documentation from the facility does not show anything that says the patient is not alert and oriented.  I attempted to contact the emergency multiple times however kept getting a voicemail.  So collateral was unable to be obtained.

## 2024-04-12 NOTE — ED PROVIDER NOTE - SKIN [+], MLM
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]    Procedure(s) (LRB):  TURBT (TRANSURETHRAL RESECTION OF BLADDER TUMOR) (N/A)  DILATION, URETHRA (N/A)  BIOPSY, BLADDER (N/A)  FULGURATION, BLADDER (N/A)  INSERTION (N/A)    Anesthesia: General    Operative Findings:   Random bladder biopsy of right lateral wall, posterior wall and left lateral wall sent as 3 separate specimens  Biopsy of posterior wall flat patch, sent as separate specimen  TURBT of anterior wall tumor, approximately 5 cm  Fulguration of all biopsy and resection sites  Urethral dilation from 18 Fr to 28 Fr with [place] sounds    Estimated Blood Loss: * No values recorded between 4/12/2024  7:16 AM and 4/12/2024  8:03 AM *         Specimens:   Specimen (24h ago, onward)       Start     Ordered    Pending  Specimen to Pathology, Surgery Urology  Once        Comments: Pre-op Diagnosis: 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 Right lateral wall2. Posterior wall3. Left lateral wall     References:    Click here for ordering Quick Tip   Question Answer Comment   Procedure Type: Urology    Which provider would you like to cc? MAXWELL CORDON    Release to patient Immediate        Pending                      Discharge Note    OUTCOME: Patient tolerated treatment/procedure well without complication and is now ready for discharge.    DISPOSITION: Home or Self Care    FINAL DIAGNOSIS:  Bladder cancer    FOLLOWUP: In clinic in 3 days for Jernigan removal    DISCHARGE INSTRUCTIONS:     Discharge Procedure Orders   Notify your health care provider if you experience any of the following:  temperature >100.4     Notify your health care provider if you experience any of the following:  persistent nausea and vomiting or diarrhea     Notify your health care provider if you experience any of the following:  severe uncontrolled pain     Notify your health care provider if you experience any of the following:  redness, tenderness, or signs of infection (pain, swelling, redness, odor or green/yellow discharge around incision site)     Notify your health care provider if you experience any of the following:  worsening rash     Notify your health care provider if you experience any of the following:  persistent dizziness, light-headedness, or visual disturbances        LACERATION

## 2024-05-06 ENCOUNTER — EMERGENCY (EMERGENCY)
Facility: HOSPITAL | Age: 89
LOS: 1 days | Discharge: HOME | End: 2024-05-06
Attending: EMERGENCY MEDICINE | Admitting: STUDENT IN AN ORGANIZED HEALTH CARE EDUCATION/TRAINING PROGRAM
Payer: MEDICARE

## 2024-05-06 VITALS
HEART RATE: 72 BPM | SYSTOLIC BLOOD PRESSURE: 164 MMHG | RESPIRATION RATE: 15 BRPM | DIASTOLIC BLOOD PRESSURE: 65 MMHG | OXYGEN SATURATION: 100 %

## 2024-05-06 VITALS
RESPIRATION RATE: 18 BRPM | HEART RATE: 100 BPM | WEIGHT: 126.99 LBS | DIASTOLIC BLOOD PRESSURE: 78 MMHG | OXYGEN SATURATION: 99 % | TEMPERATURE: 98 F | HEIGHT: 64 IN | SYSTOLIC BLOOD PRESSURE: 160 MMHG

## 2024-05-06 LAB
ALBUMIN SERPL ELPH-MCNC: 3 G/DL — LOW (ref 3.3–5)
ALP SERPL-CCNC: 163 U/L — HIGH (ref 40–120)
ALT FLD-CCNC: 23 U/L — SIGNIFICANT CHANGE UP (ref 10–45)
ANION GAP SERPL CALC-SCNC: 11 MMOL/L — SIGNIFICANT CHANGE UP (ref 5–17)
APPEARANCE UR: CLEAR — SIGNIFICANT CHANGE UP
AST SERPL-CCNC: 30 U/L — SIGNIFICANT CHANGE UP (ref 10–40)
BACTERIA # UR AUTO: ABNORMAL /HPF
BASOPHILS # BLD AUTO: 0.02 K/UL — SIGNIFICANT CHANGE UP (ref 0–0.2)
BASOPHILS NFR BLD AUTO: 0.3 % — SIGNIFICANT CHANGE UP (ref 0–2)
BILIRUB SERPL-MCNC: 1.1 MG/DL — SIGNIFICANT CHANGE UP (ref 0.2–1.2)
BILIRUB UR-MCNC: NEGATIVE — SIGNIFICANT CHANGE UP
BUN SERPL-MCNC: 40 MG/DL — HIGH (ref 7–23)
CALCIUM SERPL-MCNC: 9.4 MG/DL — SIGNIFICANT CHANGE UP (ref 8.4–10.5)
CHLORIDE SERPL-SCNC: 106 MMOL/L — SIGNIFICANT CHANGE UP (ref 96–108)
CO2 SERPL-SCNC: 24 MMOL/L — SIGNIFICANT CHANGE UP (ref 22–31)
COLOR SPEC: YELLOW — SIGNIFICANT CHANGE UP
CREAT SERPL-MCNC: 1.08 MG/DL — SIGNIFICANT CHANGE UP (ref 0.5–1.3)
DIFF PNL FLD: NEGATIVE — SIGNIFICANT CHANGE UP
EGFR: 48 ML/MIN/1.73M2 — LOW
EOSINOPHIL # BLD AUTO: 0.21 K/UL — SIGNIFICANT CHANGE UP (ref 0–0.5)
EOSINOPHIL NFR BLD AUTO: 3.1 % — SIGNIFICANT CHANGE UP (ref 0–6)
EPI CELLS # UR: 1 — SIGNIFICANT CHANGE UP
GLUCOSE BLDC GLUCOMTR-MCNC: 104 MG/DL — HIGH (ref 70–99)
GLUCOSE SERPL-MCNC: 102 MG/DL — HIGH (ref 70–99)
GLUCOSE UR QL: NEGATIVE MG/DL — SIGNIFICANT CHANGE UP
HCT VFR BLD CALC: 35.1 % — SIGNIFICANT CHANGE UP (ref 34.5–45)
HGB BLD-MCNC: 11.5 G/DL — SIGNIFICANT CHANGE UP (ref 11.5–15.5)
IMM GRANULOCYTES NFR BLD AUTO: 0.4 % — SIGNIFICANT CHANGE UP (ref 0–0.9)
KETONES UR-MCNC: NEGATIVE MG/DL — SIGNIFICANT CHANGE UP
LEUKOCYTE ESTERASE UR-ACNC: ABNORMAL
LYMPHOCYTES # BLD AUTO: 1.05 K/UL — SIGNIFICANT CHANGE UP (ref 1–3.3)
LYMPHOCYTES # BLD AUTO: 15.6 % — SIGNIFICANT CHANGE UP (ref 13–44)
MCHC RBC-ENTMCNC: 29.3 PG — SIGNIFICANT CHANGE UP (ref 27–34)
MCHC RBC-ENTMCNC: 32.8 GM/DL — SIGNIFICANT CHANGE UP (ref 32–36)
MCV RBC AUTO: 89.3 FL — SIGNIFICANT CHANGE UP (ref 80–100)
MONOCYTES # BLD AUTO: 0.57 K/UL — SIGNIFICANT CHANGE UP (ref 0–0.9)
MONOCYTES NFR BLD AUTO: 8.5 % — SIGNIFICANT CHANGE UP (ref 2–14)
NEUTROPHILS # BLD AUTO: 4.84 K/UL — SIGNIFICANT CHANGE UP (ref 1.8–7.4)
NEUTROPHILS NFR BLD AUTO: 72.1 % — SIGNIFICANT CHANGE UP (ref 43–77)
NITRITE UR-MCNC: POSITIVE
NRBC # BLD: 0 /100 WBCS — SIGNIFICANT CHANGE UP (ref 0–0)
PH UR: 6 — SIGNIFICANT CHANGE UP (ref 5–8)
PLATELET # BLD AUTO: 211 K/UL — SIGNIFICANT CHANGE UP (ref 150–400)
POTASSIUM SERPL-MCNC: 5.6 MMOL/L — HIGH (ref 3.5–5.3)
POTASSIUM SERPL-SCNC: 5.6 MMOL/L — HIGH (ref 3.5–5.3)
PROT SERPL-MCNC: 7.8 G/DL — SIGNIFICANT CHANGE UP (ref 6–8.3)
PROT UR-MCNC: NEGATIVE MG/DL — SIGNIFICANT CHANGE UP
RBC # BLD: 3.93 M/UL — SIGNIFICANT CHANGE UP (ref 3.8–5.2)
RBC # FLD: 14.3 % — SIGNIFICANT CHANGE UP (ref 10.3–14.5)
RBC CASTS # UR COMP ASSIST: 2 /HPF — SIGNIFICANT CHANGE UP (ref 0–4)
SODIUM SERPL-SCNC: 141 MMOL/L — SIGNIFICANT CHANGE UP (ref 135–145)
SP GR SPEC: 1.01 — SIGNIFICANT CHANGE UP (ref 1–1.03)
UROBILINOGEN FLD QL: 0.2 MG/DL — SIGNIFICANT CHANGE UP (ref 0.2–1)
WBC # BLD: 6.72 K/UL — SIGNIFICANT CHANGE UP (ref 3.8–10.5)
WBC # FLD AUTO: 6.72 K/UL — SIGNIFICANT CHANGE UP (ref 3.8–10.5)
WBC UR QL: 6 /HPF — HIGH (ref 0–5)

## 2024-05-06 PROCEDURE — 93005 ELECTROCARDIOGRAM TRACING: CPT

## 2024-05-06 PROCEDURE — 99284 EMERGENCY DEPT VISIT MOD MDM: CPT

## 2024-05-06 PROCEDURE — 82962 GLUCOSE BLOOD TEST: CPT

## 2024-05-06 PROCEDURE — 71045 X-RAY EXAM CHEST 1 VIEW: CPT

## 2024-05-06 PROCEDURE — 70450 CT HEAD/BRAIN W/O DYE: CPT | Mod: MC

## 2024-05-06 PROCEDURE — 81001 URINALYSIS AUTO W/SCOPE: CPT

## 2024-05-06 PROCEDURE — 80053 COMPREHEN METABOLIC PANEL: CPT

## 2024-05-06 PROCEDURE — 99285 EMERGENCY DEPT VISIT HI MDM: CPT | Mod: 25

## 2024-05-06 PROCEDURE — 85025 COMPLETE CBC W/AUTO DIFF WBC: CPT

## 2024-05-06 PROCEDURE — 71045 X-RAY EXAM CHEST 1 VIEW: CPT | Mod: 26

## 2024-05-06 PROCEDURE — 70450 CT HEAD/BRAIN W/O DYE: CPT | Mod: 26,MC

## 2024-05-06 PROCEDURE — 36415 COLL VENOUS BLD VENIPUNCTURE: CPT

## 2024-05-06 PROCEDURE — 93010 ELECTROCARDIOGRAM REPORT: CPT

## 2024-05-06 RX ORDER — SODIUM CHLORIDE 9 MG/ML
500 INJECTION INTRAMUSCULAR; INTRAVENOUS; SUBCUTANEOUS ONCE
Refills: 0 | Status: COMPLETED | OUTPATIENT
Start: 2024-05-06 | End: 2024-05-06

## 2024-05-06 RX ADMIN — SODIUM CHLORIDE 1000 MILLILITER(S): 9 INJECTION INTRAMUSCULAR; INTRAVENOUS; SUBCUTANEOUS at 13:02

## 2024-05-06 NOTE — ED ADULT TRIAGE NOTE - CHIEF COMPLAINT QUOTE
Pt BIBA from the [facility] altered mental status since Friday with wheezing/low oxygen level

## 2024-05-06 NOTE — ED PROVIDER NOTE - OBJECTIVE STATEMENT
89-year-old female with history of hypertension hyperlipidemia and diabetes hypothyroid hypercholesterolemia depression presents with altered mental status and low oxygen level today.  Patient was brought in by ambulance from [facility].  Patient has decreased hearing but has no complaints.  No short of breath, no chest pain, no nausea no vomiting.  Patient is relatively poor historian.  No other complaints

## 2024-05-06 NOTE — ED PROVIDER NOTE - PATIENT PORTAL LINK FT
You can access the FollowMyHealth Patient Portal offered by [facility] by registering at the following website: [email or web address]. By joining [email or web address]’s FollowMyHealth portal, you will also be able to view your health information using other applications (apps) compatible with our system.

## 2024-05-06 NOTE — ED ADULT NURSE NOTE - NSFALLHARMRISKINTERV_ED_ALL_ED

## 2024-05-06 NOTE — ED ADULT NURSE NOTE - CHIEF COMPLAINT QUOTE
Pt BIBA from the [facility] altered mental status since Friday with wheezing/low oxygen level

## 2024-05-06 NOTE — ED ADULT NURSE NOTE - OBJECTIVE STATEMENT
pt arrives drowsy, FS noted-after glass of apple juice the pt is more awake and alert- VS noted, pt 100% pulse ox with good wave on forehead-fingers are cool and with nailpolish- pt states she is hungry

## 2024-05-06 NOTE — GOALS OF CARE CONVERSATION - ADVANCED CARE PLANNING - CONVERSATION DETAILS
Pt. here from [facility] assisted living with wheezing and AMS. She has hx. DM and HTN. Pt. is awake and alert. Oriented to self, and knows she's in [place], agreed she lives at the [facility]. Was not sure about what year it is. I called her son Khris to review GOC. Pt. has MOLST prior signed by Khris 10/17/22, DNR/DNI.  He confirms she is sometimes confused, and also that this MOLST is still in effect, no GOC changes.

## 2024-07-22 ENCOUNTER — INPATIENT (INPATIENT)
Facility: HOSPITAL | Age: 89
LOS: 10 days | Discharge: HOSPICE | DRG: 948 | End: 2024-08-02
Attending: INTERNAL MEDICINE | Admitting: INTERNAL MEDICINE
Payer: MEDICARE

## 2024-07-22 VITALS
TEMPERATURE: 98 F | RESPIRATION RATE: 20 BRPM | OXYGEN SATURATION: 96 % | HEART RATE: 108 BPM | WEIGHT: 119.93 LBS | HEIGHT: 61 IN | SYSTOLIC BLOOD PRESSURE: 105 MMHG | DIASTOLIC BLOOD PRESSURE: 54 MMHG

## 2024-07-22 DIAGNOSIS — R41.82 ALTERED MENTAL STATUS, UNSPECIFIED: ICD-10-CM

## 2024-07-22 LAB
ALBUMIN SERPL ELPH-MCNC: 1.6 G/DL — LOW (ref 3.3–5)
ALBUMIN SERPL ELPH-MCNC: 3.1 G/DL — LOW (ref 3.3–5)
ALP SERPL-CCNC: 171 U/L — HIGH (ref 40–120)
ALP SERPL-CCNC: 93 U/L — SIGNIFICANT CHANGE UP (ref 40–120)
ALT FLD-CCNC: 15 U/L — SIGNIFICANT CHANGE UP (ref 10–45)
ALT FLD-CCNC: 23 U/L — SIGNIFICANT CHANGE UP (ref 10–45)
ANION GAP SERPL CALC-SCNC: 13 MMOL/L — SIGNIFICANT CHANGE UP (ref 5–17)
ANION GAP SERPL CALC-SCNC: 9 MMOL/L — SIGNIFICANT CHANGE UP (ref 5–17)
AST SERPL-CCNC: 22 U/L — SIGNIFICANT CHANGE UP (ref 10–40)
AST SERPL-CCNC: 39 U/L — SIGNIFICANT CHANGE UP (ref 10–40)
BASE EXCESS BLDA CALC-SCNC: 2.8 MMOL/L — SIGNIFICANT CHANGE UP (ref -2–3)
BASOPHILS # BLD AUTO: 0.02 K/UL — SIGNIFICANT CHANGE UP (ref 0–0.2)
BASOPHILS NFR BLD AUTO: 0.3 % — SIGNIFICANT CHANGE UP (ref 0–2)
BILIRUB SERPL-MCNC: 0.5 MG/DL — SIGNIFICANT CHANGE UP (ref 0.2–1.2)
BILIRUB SERPL-MCNC: 0.9 MG/DL — SIGNIFICANT CHANGE UP (ref 0.2–1.2)
BUN SERPL-MCNC: 66 MG/DL — HIGH (ref 7–23)
BUN SERPL-MCNC: 90 MG/DL — HIGH (ref 7–23)
CALCIUM SERPL-MCNC: 6 MG/DL — CRITICAL LOW (ref 8.4–10.5)
CALCIUM SERPL-MCNC: 9.7 MG/DL — SIGNIFICANT CHANGE UP (ref 8.4–10.5)
CHLORIDE SERPL-SCNC: 120 MMOL/L — HIGH (ref 96–108)
CHLORIDE SERPL-SCNC: 133 MMOL/L — HIGH (ref 96–108)
CO2 BLDA-SCNC: 29 MMOL/L — HIGH (ref 19–24)
CO2 SERPL-SCNC: 22 MMOL/L — SIGNIFICANT CHANGE UP (ref 22–31)
CO2 SERPL-SCNC: 28 MMOL/L — SIGNIFICANT CHANGE UP (ref 22–31)
CREAT SERPL-MCNC: 2.62 MG/DL — HIGH (ref 0.5–1.3)
CREAT SERPL-MCNC: 4.19 MG/DL — HIGH (ref 0.5–1.3)
EGFR: 16 ML/MIN/1.73M2 — LOW
EGFR: 9 ML/MIN/1.73M2 — LOW
EOSINOPHIL # BLD AUTO: 0.11 K/UL — SIGNIFICANT CHANGE UP (ref 0–0.5)
EOSINOPHIL NFR BLD AUTO: 1.7 % — SIGNIFICANT CHANGE UP (ref 0–6)
GAS PNL BLDA: SIGNIFICANT CHANGE UP
GLUCOSE SERPL-MCNC: 130 MG/DL — HIGH (ref 70–99)
GLUCOSE SERPL-MCNC: 85 MG/DL — SIGNIFICANT CHANGE UP (ref 70–99)
HCO3 BLDA-SCNC: 28 MMOL/L — SIGNIFICANT CHANGE UP (ref 21–28)
HCT VFR BLD CALC: 24.6 % — LOW (ref 34.5–45)
HGB BLD-MCNC: 7.2 G/DL — LOW (ref 11.5–15.5)
HOROWITZ INDEX BLDA+IHG-RTO: 100 — SIGNIFICANT CHANGE UP
IMM GRANULOCYTES NFR BLD AUTO: 0.6 % — SIGNIFICANT CHANGE UP (ref 0–0.9)
LACTATE SERPL-SCNC: 2 MMOL/L — SIGNIFICANT CHANGE UP (ref 0.7–2)
LYMPHOCYTES # BLD AUTO: 0.92 K/UL — LOW (ref 1–3.3)
LYMPHOCYTES # BLD AUTO: 14.1 % — SIGNIFICANT CHANGE UP (ref 13–44)
MAGNESIUM SERPL-MCNC: 2.6 MG/DL — SIGNIFICANT CHANGE UP (ref 1.6–2.6)
MCHC RBC-ENTMCNC: 29 PG — SIGNIFICANT CHANGE UP (ref 27–34)
MCHC RBC-ENTMCNC: 29.3 GM/DL — LOW (ref 32–36)
MCV RBC AUTO: 99.2 FL — SIGNIFICANT CHANGE UP (ref 80–100)
MONOCYTES # BLD AUTO: 0.46 K/UL — SIGNIFICANT CHANGE UP (ref 0–0.9)
MONOCYTES NFR BLD AUTO: 7.1 % — SIGNIFICANT CHANGE UP (ref 2–14)
NEUTROPHILS # BLD AUTO: 4.97 K/UL — SIGNIFICANT CHANGE UP (ref 1.8–7.4)
NEUTROPHILS NFR BLD AUTO: 76.2 % — SIGNIFICANT CHANGE UP (ref 43–77)
NRBC # BLD: 0 /100 WBCS — SIGNIFICANT CHANGE UP (ref 0–0)
OB PNL STL: NEGATIVE — SIGNIFICANT CHANGE UP
PCO2 BLDA: 46 MMHG — HIGH (ref 32–35)
PH BLDA: 7.39 — SIGNIFICANT CHANGE UP (ref 7.35–7.45)
PLATELET # BLD AUTO: 143 K/UL — LOW (ref 150–400)
PO2 BLDA: 187 MMHG — HIGH (ref 83–108)
POTASSIUM SERPL-MCNC: 3.1 MMOL/L — LOW (ref 3.5–5.3)
POTASSIUM SERPL-MCNC: 4.5 MMOL/L — SIGNIFICANT CHANGE UP (ref 3.5–5.3)
POTASSIUM SERPL-SCNC: 3.1 MMOL/L — LOW (ref 3.5–5.3)
POTASSIUM SERPL-SCNC: 4.5 MMOL/L — SIGNIFICANT CHANGE UP (ref 3.5–5.3)
PROT SERPL-MCNC: 4.3 G/DL — LOW (ref 6–8.3)
PROT SERPL-MCNC: 7.7 G/DL — SIGNIFICANT CHANGE UP (ref 6–8.3)
RBC # BLD: 2.48 M/UL — LOW (ref 3.8–5.2)
RBC # FLD: 16.1 % — HIGH (ref 10.3–14.5)
SAO2 % BLDA: 100 % — HIGH (ref 94–98)
SODIUM SERPL-SCNC: 161 MMOL/L — CRITICAL HIGH (ref 135–145)
SODIUM SERPL-SCNC: 164 MMOL/L — CRITICAL HIGH (ref 135–145)
TROPONIN I, HIGH SENSITIVITY RESULT: 16.9 NG/L — SIGNIFICANT CHANGE UP
WBC # BLD: 6.52 K/UL — SIGNIFICANT CHANGE UP (ref 3.8–10.5)
WBC # FLD AUTO: 6.52 K/UL — SIGNIFICANT CHANGE UP (ref 3.8–10.5)

## 2024-07-22 PROCEDURE — 71250 CT THORAX DX C-: CPT | Mod: 26,MC

## 2024-07-22 PROCEDURE — 99291 CRITICAL CARE FIRST HOUR: CPT

## 2024-07-22 PROCEDURE — 70450 CT HEAD/BRAIN W/O DYE: CPT | Mod: 26,MC

## 2024-07-22 PROCEDURE — 74176 CT ABD & PELVIS W/O CONTRAST: CPT | Mod: 26,MC

## 2024-07-22 PROCEDURE — 93010 ELECTROCARDIOGRAM REPORT: CPT

## 2024-07-22 RX ORDER — PIPERACILLIN SODIUM, TAZOBACTAM SODIUM 3; .375 G/15ML; G/15ML
3.38 INJECTION, POWDER, LYOPHILIZED, FOR SOLUTION INTRAVENOUS EVERY 12 HOURS
Refills: 0 | Status: DISCONTINUED | OUTPATIENT
Start: 2024-07-23 | End: 2024-07-27

## 2024-07-22 RX ORDER — PIPERACILLIN SODIUM, TAZOBACTAM SODIUM 3; .375 G/15ML; G/15ML
3.38 INJECTION, POWDER, LYOPHILIZED, FOR SOLUTION INTRAVENOUS ONCE
Refills: 0 | Status: COMPLETED | OUTPATIENT
Start: 2024-07-22 | End: 2024-07-22

## 2024-07-22 RX ORDER — VANCOMYCIN HYDROCHLORIDE 5 G/100ML
1000 INJECTION, POWDER, LYOPHILIZED, FOR SOLUTION INTRAVENOUS ONCE
Refills: 0 | Status: COMPLETED | OUTPATIENT
Start: 2024-07-22 | End: 2024-07-22

## 2024-07-22 RX ORDER — BACTERIOSTATIC SODIUM CHLORIDE 0.9 %
1650 VIAL (ML) INJECTION ONCE
Refills: 0 | Status: COMPLETED | OUTPATIENT
Start: 2024-07-22 | End: 2024-07-22

## 2024-07-22 RX ORDER — LEVOTHYROXINE SODIUM 175 MCG
84 TABLET ORAL AT BEDTIME
Refills: 0 | Status: DISCONTINUED | OUTPATIENT
Start: 2024-07-22 | End: 2024-07-27

## 2024-07-22 RX ORDER — PIPERACILLIN SODIUM, TAZOBACTAM SODIUM 3; .375 G/15ML; G/15ML
3.38 INJECTION, POWDER, LYOPHILIZED, FOR SOLUTION INTRAVENOUS ONCE
Refills: 0 | Status: COMPLETED | OUTPATIENT
Start: 2024-07-23 | End: 2024-07-23

## 2024-07-22 RX ADMIN — Medication 100 MILLIGRAM(S): at 21:10

## 2024-07-22 RX ADMIN — Medication 1650 MILLILITER(S): at 22:37

## 2024-07-22 NOTE — ED PROVIDER NOTE - PHYSICAL EXAMINATION
Vitals: I have reviewed the patients vital signs  General: no distress, pt awake but not interactive, appears confused.   HEENT: Atraumatic, normocephalic, airway patent  Oral mucosa notably dry.   Neck: no tracheal deviation  Chest/Lungs: no trauma, symmetric chest rise, not speaking,  mildly tachypneic.   Heart: skin and extremities well perfused, tachycardia, irregular.  Neuro: Awake but disoriented - unable to answer questions  MSK: contracted (laying on left side curled up in fetal position - difficult to change position)  Skin: no cyanosis, no jaundice ; no sign of trauma.
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
## 2024-07-22 NOTE — ED ADULT NURSE NOTE - CHIEF COMPLAINT QUOTE
pt BIBA from the [facility] for weakness and hypoxia. pt supplemented with 2L NC by EMS.

## 2024-07-22 NOTE — ED ADULT NURSE NOTE - NSFALLHARMRISKINTERV_ED_ALL_ED

## 2024-07-22 NOTE — ED PROVIDER NOTE - CLINICAL SUMMARY MEDICAL DECISION MAKING FREE TEXT BOX
89-year-old female presents to the emergency department for altered mental status.  Patient has history of hypertension hyperlipidemia, diabetes, hypothyroid, hypercholesterolemia and depression and is full code.  Patient has altered mental status and a low oxygen level noted today.  Patient brought in by ambulance.  Patient has decreased hearing at baseline according to emergency contact.  He states that she has been having a progressive decline over a week.  Typically when patient gets like this, there is an infection brewing.  Patient commonly experiences urinary tract infections and aspiration pneumonia.  Patient is usually awake alert and oriented at baseline.  Patient is now unable to communicate her needs.  Confused.  Chronically ill-appearing.  Exam as stated. D/w pt emergency contact. Admission is likely. Will obtain CT brain and Chest. UA. Labs. 89-year-old female presents to the emergency department for altered mental status.  Patient has history of hypertension hyperlipidemia, diabetes, hypothyroid, hypercholesterolemia and depression and is full code.  Patient has altered mental status and a low oxygen level noted today.  Patient brought in by ambulance.  Patient has decreased hearing at baseline according to emergency contact.  He states that she has been having a progressive decline over a week.  Typically when patient gets like this, there is an infection brewing.  Patient commonly experiences urinary tract infections and aspiration pneumonia.  Patient is usually awake alert and oriented at baseline.  Patient is now unable to communicate her needs.  Confused.  Chronically ill-appearing.  Exam as stated. D/w pt emergency contact. Admission is likely. Will obtain CT brain and Chest. UA. Labs.  ICU consulted due to metabolic derangements.

## 2024-07-22 NOTE — ED PROVIDER NOTE - CARE PLAN
Principal Discharge DX:	AMS (altered mental status)  Secondary Diagnosis:	Dehydration  Secondary Diagnosis:	Dehydration with hypernatremia  Secondary Diagnosis:	MARGARET (acute kidney injury)   1

## 2024-07-22 NOTE — ED ADULT TRIAGE NOTE - CHIEF COMPLAINT QUOTE
pt BIBA from the [facility] for weakness and hypoxia. pt supplemented with 2L NC by EMS.

## 2024-07-22 NOTE — ED ADULT NURSE NOTE - OBJECTIVE STATEMENT
Patient BIBAs c/o lethargy and hypoxia arrived to ED on 2L NC, Patient A&Ox1 lethargic, patient placed on 6L NC, safety measures maintained, call bell within reach, nursing care continued

## 2024-07-22 NOTE — H&P ADULT - ASSESSMENT
89F PMHx HTN, HLD, T2DM, Hypothyroidism presents from Facility with AMS, decreased PO intake x1wk.   Assessment:  1. Sepsis  2. MARGARET on CKD  3. Severe Hypernatremia  4. Anemia  5. Thrombocytopenia  6. r/o UTI    Plan:  NEURO:   -CT Head negative for acute intracranial pathology.   -Monitor mental status closely, avoid neurosuppresants.   -Serial neurologic assessments.     CV:   -Maintain goal MAP >65. Low threshold to institute IV vasopressor therapy to maintain goal MAP. Monitor end points of organ perfusion; Lactate wnl.   -Keep K~4 and Mg>2 for optimal arrhythmia suppression.  -Official TTE in AM.     PULM:   -Satting well on NRB, will utilize supplemental O2 PRN to maintain goal SpO2 >88%. Will attempt to wean off supplemental O2 as able.   -Incentive spirometry, Chest PT/Pulmonary toilet, HOB >30'.   -CT Chest with L atelectasis.       GI:   -NPO.     RENAL:   -Renal function currently with MARGARET, likely some component of pre-renal from hypovolemia iso dehydration.    -trend lytes/Scr daily with BMP  -I's and O's, goal UOP 0.5 cc/kg/hr  -renal dose meds and avoid nephrotoxins   -Severely elevated Na level likely iso severe dehydration. Gentle mIVF with . Goal lower Na level no greater than 6-8mM Q24H. Serial BMP Q4-6H.   -CT A/P pending.     ENDO:   -Aggressive glycemic control to limit FS glucose to <180mg/dl.   -Synthroid for history of hypothyroidism, keep euthyroid. Check TFTs.     ID:   -Afebrile. BloodCx sent - pending. UA/UCx pending. S/p x1 Rocephin in ER. Will place on empiric Zosyn course and give x1 dose of Vancomycin pending Cx results.   -ABX use and/or discontinuation based on discussion with ID in conjunction with clinical features, culture data, and judicious procalcitonin monitoring.    HEME:   -Baseline Hgb appears high 8-10s. No signs/sx of bleeding. Repeat CBC now. Transfuse for Hgb <8. Serial CBC, keep active t/s. HOLD ASA/Plavix.   -VTE ppx with SCDs.     GOC: Spoke with patient's son, Norris, confirmed DNR/DNI status. New MOLST filled out and placed in chart/  DISPO: Case and plan discussed with eICU attending, Dr. Perla. Admit to MICU. 89F PMHx HTN, HLD, T2DM, Hypothyroidism presents from Facility with AMS, decreased PO intake x1wk.   Assessment:  1. ?Sepsis  2. MARGARET on CKD  3. Severe Hypernatremia  4. Anemia  5. Thrombocytopenia  6. Metabolic Encephalopathy  7. r/o UTI    Plan:  NEURO:   -CT Head negative for acute intracranial pathology.   -Monitor mental status closely, avoid neurosuppresants.   -Serial neurologic assessments.     CV:   -Maintain goal MAP >65. Low threshold to institute IV vasopressor therapy to maintain goal MAP. Monitor end points of organ perfusion; Lactate wnl.   -Keep K~4 and Mg>2 for optimal arrhythmia suppression.  -Official TTE in AM.     PULM:   -Satting well on NRB, will utilize supplemental O2 PRN to maintain goal SpO2 >88%. Will attempt to wean off supplemental O2 as able.   -Incentive spirometry, Chest PT/Pulmonary toilet, HOB >30'.   -CT Chest with L atelectasis.       GI:   -NPO.     RENAL:   -Renal function currently with MARGARET, likely some component of pre-renal from hypovolemia iso dehydration.    -trend lytes/Scr daily with BMP  -I's and O's, goal UOP 0.5 cc/kg/hr  -renal dose meds and avoid nephrotoxins   -Severely elevated Na level likely iso severe dehydration. Gentle mIVF with . Goal lower Na level no greater than 6-8mM Q24H. Serial BMP Q4-6H.   -CT A/P pending.     ENDO:   -Aggressive glycemic control to limit FS glucose to <180mg/dl.   -Synthroid for history of hypothyroidism, keep euthyroid. Check TFTs.     ID:   -Afebrile. BloodCx sent - pending. UA/UCx pending. S/p x1 Rocephin in ER. Will place on empiric Zosyn course and give x1 dose of Vancomycin pending Cx results.   -ABX use and/or discontinuation based on discussion with ID in conjunction with clinical features, culture data, and judicious procalcitonin monitoring.    HEME:   -Baseline Hgb appears high 8-10s. No signs/sx of bleeding. Repeat CBC now. Transfuse for Hgb <8. Serial CBC, keep active t/s. HOLD ASA/Plavix.   -VTE ppx with SCDs.     GOC: Spoke with patient's son, Norris, confirmed DNR/DNI status. New MOLST filled out and placed in chart/  DISPO: Case and plan discussed with eICU attending, Dr. Perla. Admit to MICU. 89F PMHx HTN, HLD, T2DM, Hypothyroidism presents from Facility with AMS, decreased PO intake x1wk.   Assessment:  1. Sepsis  2. MARGARET   3. Severe Hypernatremia  4. Anemia  5. Thrombocytopenia  6. Metabolic Encephalopathy  7. r/o UTI  8. Colitis     Plan:  NEURO:   -CT Head negative for acute intracranial pathology.   -Monitor mental status closely, avoid neurosuppresants.   -Serial neurologic assessments.     CV:   -Maintain goal MAP >65. Low threshold to institute IV vasopressor therapy to maintain goal MAP. Monitor end points of organ perfusion; Lactate wnl.   -Keep K~4 and Mg>2 for optimal arrhythmia suppression.  -Lipitor QD.   -Official TTE in AM.     PULM:   -Satting well on NRB, will utilize supplemental O2 PRN to maintain goal SpO2 >88%. Will attempt to wean off supplemental O2 as able.   -Incentive spirometry, Chest PT/Pulmonary toilet, HOB >30'.   -CT Chest with L atelectasis.       GI:   -NPO.   -CT A/P with possible stercoral colitis.     RENAL:   -Renal function currently with MARGARET, likely some component of pre-renal from hypovolemia iso dehydration.    -trend lytes/Scr daily with BMP  -I's and O's, goal UOP 0.5 cc/kg/hr  -renal dose meds and avoid nephrotoxins   -Severely elevated Na level likely iso severe dehydration. Gentle mIVF with D5 75cc/h. Goal lower Na level no greater than 6-8mM Q24H to prevent overcorrection. Serial BMP Q4-6H.     ENDO:   -Aggressive glycemic control to limit FS glucose to <180mg/dl.   -Synthroid for history of hypothyroidism, keep euthyroid. Check TFTs.     ID:   -Afebrile. BloodCx sent - pending. UA/UCx pending. S/p x1 Rocephin in ER. Will place on empiric Zosyn course and give x1 dose of Vancomycin pending Cx results.   -ABX use and/or discontinuation based on discussion with ID in conjunction with clinical features, culture data, and judicious procalcitonin monitoring.    HEME:   -Baseline Hgb appears high 8-10s. No signs/sx of bleeding. Repeat CBC now. Transfuse for Hgb <8. Serial CBC, keep active t/s.    -VTE ppx with SQH.     GOC: Spoke with patient's son, Norris, confirmed DNR/DNI status. New MOLST filled out and placed in chart/  DISPO: Case and plan discussed with eICU attending, Dr. Perla. Admit to MICU.

## 2024-07-22 NOTE — ED PROVIDER NOTE - OBJECTIVE STATEMENT
89-year-old female presents to the emergency department for altered mental status.  Patient has history of hypertension hyperlipidemia, diabetes, hypothyroid, hypercholesterolemia and depression and is full code.  Patient has altered mental status and a low oxygen level noted today.  Patient brought in by ambulance.  Patient has decreased hearing at baseline according to emergency contact.  He states that she has been having a progressive decline over a week.  Typically when patient gets like this, there is an infection brewing.  Patient commonly experiences urinary tract infections and aspiration pneumonia.  Patient is usually awake alert and oriented at baseline.  Patient is now unable to communicate her needs.  Confused.  Chronically ill-appearing.

## 2024-07-22 NOTE — CONSULT NOTE ADULT - SUBJECTIVE AND OBJECTIVE BOX
89 yo F with acute encephalopathy. Reportedly, progressive decline over a week noted.  EKG reported with Afib, newly diagnosed.  Respiratory distress, presented with hypoxia.  MARGARET. Hypernatremia. Likely due to hypovolemia.  Acute on chronic anemia (baseline Hgb ~10). Hemodynamically stable, Bi WNL.  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    Recommended:  monitor neuro status  maintain MAP>65, monitor HR, pressor PRN  maintain Osats>92%, monitor resp status, supplemental O2 PRN, check ABG  gentle IV hydration, monitor for signs of overload  monitor e-lytes, UOP; aim for serum Na 151-155 within 24 hrs, avoid overcorrection  would repeat CBC, BMP/Mg/Phos, obtain TSH/fT4  sepsis w/up, agree with empiric Abx  would re-check CBC; trend Hgb, maintain >8  VTE ppx - would hold any anti-thrombotics for now in view of low Hgb, pending repeat CBC  GOC discussions (05/2024 status is DNR/DNI, per EMR; confirmed))

## 2024-07-23 ENCOUNTER — RESULT REVIEW (OUTPATIENT)
Age: 89
End: 2024-07-23

## 2024-07-23 DIAGNOSIS — S72.002A FRACTURE OF UNSPECIFIED PART OF NECK OF LEFT FEMUR, INITIAL ENCOUNTER FOR CLOSED FRACTURE: ICD-10-CM

## 2024-07-23 LAB
ALBUMIN SERPL ELPH-MCNC: 2.5 G/DL — LOW (ref 3.3–5)
ALBUMIN SERPL ELPH-MCNC: 2.7 G/DL — LOW (ref 3.3–5)
ALP SERPL-CCNC: 139 U/L — HIGH (ref 40–120)
ALP SERPL-CCNC: 147 U/L — HIGH (ref 40–120)
ALT FLD-CCNC: 16 U/L — SIGNIFICANT CHANGE UP (ref 10–45)
ALT FLD-CCNC: 21 U/L — SIGNIFICANT CHANGE UP (ref 10–45)
ANION GAP SERPL CALC-SCNC: 10 MMOL/L — SIGNIFICANT CHANGE UP (ref 5–17)
ANION GAP SERPL CALC-SCNC: 12 MMOL/L — SIGNIFICANT CHANGE UP (ref 5–17)
ANION GAP SERPL CALC-SCNC: 6 MMOL/L — SIGNIFICANT CHANGE UP (ref 5–17)
ANION GAP SERPL CALC-SCNC: 8 MMOL/L — SIGNIFICANT CHANGE UP (ref 5–17)
APPEARANCE UR: ABNORMAL
APTT BLD: 31.5 SEC — SIGNIFICANT CHANGE UP (ref 24.5–35.6)
AST SERPL-CCNC: 43 U/L — HIGH (ref 10–40)
AST SERPL-CCNC: 50 U/L — HIGH (ref 10–40)
BACTERIA # UR AUTO: ABNORMAL /HPF
BASE EXCESS BLDA CALC-SCNC: 3.2 MMOL/L — HIGH (ref -2–3)
BASOPHILS # BLD AUTO: 0.03 K/UL — SIGNIFICANT CHANGE UP (ref 0–0.2)
BASOPHILS # BLD AUTO: 0.04 K/UL — SIGNIFICANT CHANGE UP (ref 0–0.2)
BASOPHILS NFR BLD AUTO: 0.3 % — SIGNIFICANT CHANGE UP (ref 0–2)
BASOPHILS NFR BLD AUTO: 0.3 % — SIGNIFICANT CHANGE UP (ref 0–2)
BILIRUB SERPL-MCNC: 0.6 MG/DL — SIGNIFICANT CHANGE UP (ref 0.2–1.2)
BILIRUB SERPL-MCNC: 0.7 MG/DL — SIGNIFICANT CHANGE UP (ref 0.2–1.2)
BILIRUB UR-MCNC: NEGATIVE — SIGNIFICANT CHANGE UP
BUN SERPL-MCNC: 89 MG/DL — HIGH (ref 7–23)
BUN SERPL-MCNC: 90 MG/DL — HIGH (ref 7–23)
BUN SERPL-MCNC: 91 MG/DL — HIGH (ref 7–23)
BUN SERPL-MCNC: 92 MG/DL — HIGH (ref 7–23)
CALCIUM SERPL-MCNC: 8.6 MG/DL — SIGNIFICANT CHANGE UP (ref 8.4–10.5)
CALCIUM SERPL-MCNC: 8.6 MG/DL — SIGNIFICANT CHANGE UP (ref 8.4–10.5)
CALCIUM SERPL-MCNC: 8.8 MG/DL — SIGNIFICANT CHANGE UP (ref 8.4–10.5)
CALCIUM SERPL-MCNC: 9.2 MG/DL — SIGNIFICANT CHANGE UP (ref 8.4–10.5)
CHLORIDE SERPL-SCNC: 120 MMOL/L — HIGH (ref 96–108)
CHLORIDE SERPL-SCNC: 121 MMOL/L — HIGH (ref 96–108)
CHLORIDE SERPL-SCNC: 122 MMOL/L — HIGH (ref 96–108)
CHLORIDE SERPL-SCNC: 122 MMOL/L — HIGH (ref 96–108)
CO2 BLDA-SCNC: 28 MMOL/L — HIGH (ref 19–24)
CO2 SERPL-SCNC: 29 MMOL/L — SIGNIFICANT CHANGE UP (ref 22–31)
CO2 SERPL-SCNC: 30 MMOL/L — SIGNIFICANT CHANGE UP (ref 22–31)
COLOR SPEC: YELLOW — SIGNIFICANT CHANGE UP
CREAT SERPL-MCNC: 3.97 MG/DL — HIGH (ref 0.5–1.3)
CREAT SERPL-MCNC: 4.03 MG/DL — HIGH (ref 0.5–1.3)
CREAT SERPL-MCNC: 4.16 MG/DL — HIGH (ref 0.5–1.3)
CREAT SERPL-MCNC: 4.24 MG/DL — HIGH (ref 0.5–1.3)
DIFF PNL FLD: ABNORMAL
EGFR: 10 ML/MIN/1.73M2 — LOW
EGFR: 10 ML/MIN/1.73M2 — LOW
EGFR: 9 ML/MIN/1.73M2 — LOW
EGFR: 9 ML/MIN/1.73M2 — LOW
EOSINOPHIL # BLD AUTO: 0.13 K/UL — SIGNIFICANT CHANGE UP (ref 0–0.5)
EOSINOPHIL # BLD AUTO: 0.25 K/UL — SIGNIFICANT CHANGE UP (ref 0–0.5)
EOSINOPHIL NFR BLD AUTO: 1.3 % — SIGNIFICANT CHANGE UP (ref 0–6)
EOSINOPHIL NFR BLD AUTO: 2.1 % — SIGNIFICANT CHANGE UP (ref 0–6)
GAS PNL BLDA: SIGNIFICANT CHANGE UP
GLUCOSE SERPL-MCNC: 111 MG/DL — HIGH (ref 70–99)
GLUCOSE SERPL-MCNC: 142 MG/DL — HIGH (ref 70–99)
GLUCOSE SERPL-MCNC: 162 MG/DL — HIGH (ref 70–99)
GLUCOSE SERPL-MCNC: 168 MG/DL — HIGH (ref 70–99)
GLUCOSE UR QL: NEGATIVE MG/DL — SIGNIFICANT CHANGE UP
HCO3 BLDA-SCNC: 27 MMOL/L — SIGNIFICANT CHANGE UP (ref 21–28)
HCT VFR BLD CALC: 33.9 % — LOW (ref 34.5–45)
HCT VFR BLD CALC: 34.2 % — LOW (ref 34.5–45)
HCT VFR BLD CALC: 35 % — SIGNIFICANT CHANGE UP (ref 34.5–45)
HCT VFR BLD CALC: 38.3 % — SIGNIFICANT CHANGE UP (ref 34.5–45)
HGB BLD-MCNC: 10 G/DL — LOW (ref 11.5–15.5)
HGB BLD-MCNC: 10.5 G/DL — LOW (ref 11.5–15.5)
HGB BLD-MCNC: 11.4 G/DL — LOW (ref 11.5–15.5)
HGB BLD-MCNC: 9.8 G/DL — LOW (ref 11.5–15.5)
HOROWITZ INDEX BLDA+IHG-RTO: 28 — SIGNIFICANT CHANGE UP
IMM GRANULOCYTES NFR BLD AUTO: 0.5 % — SIGNIFICANT CHANGE UP (ref 0–0.9)
IMM GRANULOCYTES NFR BLD AUTO: 0.5 % — SIGNIFICANT CHANGE UP (ref 0–0.9)
INR BLD: 1.06 RATIO — SIGNIFICANT CHANGE UP (ref 0.85–1.18)
INR BLD: 1.44 RATIO — HIGH (ref 0.85–1.18)
INR BLD: 2.49 RATIO — HIGH (ref 0.85–1.18)
KETONES UR-MCNC: ABNORMAL MG/DL
LEUKOCYTE ESTERASE UR-ACNC: ABNORMAL
LYMPHOCYTES # BLD AUTO: 1.37 K/UL — SIGNIFICANT CHANGE UP (ref 1–3.3)
LYMPHOCYTES # BLD AUTO: 1.42 K/UL — SIGNIFICANT CHANGE UP (ref 1–3.3)
LYMPHOCYTES # BLD AUTO: 11.4 % — LOW (ref 13–44)
LYMPHOCYTES # BLD AUTO: 14.1 % — SIGNIFICANT CHANGE UP (ref 13–44)
MAGNESIUM SERPL-MCNC: 2.2 MG/DL — SIGNIFICANT CHANGE UP (ref 1.6–2.6)
MAGNESIUM SERPL-MCNC: 2.3 MG/DL — SIGNIFICANT CHANGE UP (ref 1.6–2.6)
MAGNESIUM SERPL-MCNC: 2.4 MG/DL — SIGNIFICANT CHANGE UP (ref 1.6–2.6)
MAGNESIUM SERPL-MCNC: 2.5 MG/DL — SIGNIFICANT CHANGE UP (ref 1.6–2.6)
MCHC RBC-ENTMCNC: 27.5 PG — SIGNIFICANT CHANGE UP (ref 27–34)
MCHC RBC-ENTMCNC: 28.1 PG — SIGNIFICANT CHANGE UP (ref 27–34)
MCHC RBC-ENTMCNC: 28.5 PG — SIGNIFICANT CHANGE UP (ref 27–34)
MCHC RBC-ENTMCNC: 28.6 PG — SIGNIFICANT CHANGE UP (ref 27–34)
MCHC RBC-ENTMCNC: 28.9 GM/DL — LOW (ref 32–36)
MCHC RBC-ENTMCNC: 29.2 GM/DL — LOW (ref 32–36)
MCHC RBC-ENTMCNC: 29.8 GM/DL — LOW (ref 32–36)
MCHC RBC-ENTMCNC: 30 GM/DL — LOW (ref 32–36)
MCV RBC AUTO: 95 FL — SIGNIFICANT CHANGE UP (ref 80–100)
MCV RBC AUTO: 95.1 FL — SIGNIFICANT CHANGE UP (ref 80–100)
MCV RBC AUTO: 96.1 FL — SIGNIFICANT CHANGE UP (ref 80–100)
MCV RBC AUTO: 96.2 FL — SIGNIFICANT CHANGE UP (ref 80–100)
MONOCYTES # BLD AUTO: 0.69 K/UL — SIGNIFICANT CHANGE UP (ref 0–0.9)
MONOCYTES # BLD AUTO: 0.84 K/UL — SIGNIFICANT CHANGE UP (ref 0–0.9)
MONOCYTES NFR BLD AUTO: 6.9 % — SIGNIFICANT CHANGE UP (ref 2–14)
MONOCYTES NFR BLD AUTO: 7 % — SIGNIFICANT CHANGE UP (ref 2–14)
NEUTROPHILS # BLD AUTO: 7.72 K/UL — HIGH (ref 1.8–7.4)
NEUTROPHILS # BLD AUTO: 9.42 K/UL — HIGH (ref 1.8–7.4)
NEUTROPHILS NFR BLD AUTO: 76.9 % — SIGNIFICANT CHANGE UP (ref 43–77)
NEUTROPHILS NFR BLD AUTO: 78.7 % — HIGH (ref 43–77)
NITRITE UR-MCNC: NEGATIVE — SIGNIFICANT CHANGE UP
NRBC # BLD: 0 /100 WBCS — SIGNIFICANT CHANGE UP (ref 0–0)
PCO2 BLDA: 38 MMHG — HIGH (ref 32–35)
PH BLDA: 7.46 — HIGH (ref 7.35–7.45)
PH UR: 8 — SIGNIFICANT CHANGE UP (ref 5–8)
PHOSPHATE SERPL-MCNC: 4.3 MG/DL — SIGNIFICANT CHANGE UP (ref 2.5–4.5)
PHOSPHATE SERPL-MCNC: 4.5 MG/DL — SIGNIFICANT CHANGE UP (ref 2.5–4.5)
PHOSPHATE SERPL-MCNC: 4.8 MG/DL — HIGH (ref 2.5–4.5)
PHOSPHATE SERPL-MCNC: 5 MG/DL — HIGH (ref 2.5–4.5)
PLATELET # BLD AUTO: 213 K/UL — SIGNIFICANT CHANGE UP (ref 150–400)
PLATELET # BLD AUTO: 236 K/UL — SIGNIFICANT CHANGE UP (ref 150–400)
PLATELET # BLD AUTO: 241 K/UL — SIGNIFICANT CHANGE UP (ref 150–400)
PLATELET # BLD AUTO: 241 K/UL — SIGNIFICANT CHANGE UP (ref 150–400)
PO2 BLDA: 93 MMHG — SIGNIFICANT CHANGE UP (ref 83–108)
POTASSIUM SERPL-MCNC: 3.7 MMOL/L — SIGNIFICANT CHANGE UP (ref 3.5–5.3)
POTASSIUM SERPL-MCNC: 3.9 MMOL/L — SIGNIFICANT CHANGE UP (ref 3.5–5.3)
POTASSIUM SERPL-MCNC: 4.1 MMOL/L — SIGNIFICANT CHANGE UP (ref 3.5–5.3)
POTASSIUM SERPL-MCNC: 4.3 MMOL/L — SIGNIFICANT CHANGE UP (ref 3.5–5.3)
POTASSIUM SERPL-SCNC: 3.7 MMOL/L — SIGNIFICANT CHANGE UP (ref 3.5–5.3)
POTASSIUM SERPL-SCNC: 3.9 MMOL/L — SIGNIFICANT CHANGE UP (ref 3.5–5.3)
POTASSIUM SERPL-SCNC: 4.1 MMOL/L — SIGNIFICANT CHANGE UP (ref 3.5–5.3)
POTASSIUM SERPL-SCNC: 4.3 MMOL/L — SIGNIFICANT CHANGE UP (ref 3.5–5.3)
PROT SERPL-MCNC: 6.2 G/DL — SIGNIFICANT CHANGE UP (ref 6–8.3)
PROT SERPL-MCNC: 6.8 G/DL — SIGNIFICANT CHANGE UP (ref 6–8.3)
PROT UR-MCNC: 300 MG/DL
PROTHROM AB SERPL-ACNC: 12.1 SEC — SIGNIFICANT CHANGE UP (ref 9.5–13)
PROTHROM AB SERPL-ACNC: 16.2 SEC — HIGH (ref 9.5–13)
PROTHROM AB SERPL-ACNC: 27.6 SEC — HIGH (ref 9.5–13)
RBC # BLD: 3.56 M/UL — LOW (ref 3.8–5.2)
RBC # BLD: 3.57 M/UL — LOW (ref 3.8–5.2)
RBC # BLD: 3.68 M/UL — LOW (ref 3.8–5.2)
RBC # BLD: 3.98 M/UL — SIGNIFICANT CHANGE UP (ref 3.8–5.2)
RBC # FLD: 16.2 % — HIGH (ref 10.3–14.5)
RBC # FLD: 16.3 % — HIGH (ref 10.3–14.5)
RBC CASTS # UR COMP ASSIST: >50 /HPF — HIGH (ref 0–4)
SAO2 % BLDA: 100 % — HIGH (ref 94–98)
SODIUM SERPL-SCNC: 156 MMOL/L — HIGH (ref 135–145)
SODIUM SERPL-SCNC: 159 MMOL/L — HIGH (ref 135–145)
SODIUM SERPL-SCNC: 160 MMOL/L — CRITICAL HIGH (ref 135–145)
SODIUM SERPL-SCNC: 163 MMOL/L — CRITICAL HIGH (ref 135–145)
SP GR SPEC: 1.02 — SIGNIFICANT CHANGE UP (ref 1–1.03)
T3 SERPL-MCNC: 64 NG/DL — LOW (ref 80–200)
T3FREE SERPL-MCNC: 1.52 PG/ML — LOW (ref 2–4.4)
T4 AB SER-ACNC: 9.3 UG/DL — SIGNIFICANT CHANGE UP (ref 4.6–12)
T4 FREE SERPL-MCNC: 1.8 NG/DL — SIGNIFICANT CHANGE UP (ref 0.9–1.8)
T4 FREE SERPL-MCNC: 1.9 NG/DL — HIGH (ref 0.9–1.8)
TRI-PHOS CRY UR QL COMP ASSIST: PRESENT
TSH SERPL-MCNC: 3.02 UIU/ML — SIGNIFICANT CHANGE UP (ref 0.36–3.74)
TSH SERPL-MCNC: 4.1 UIU/ML — HIGH (ref 0.36–3.74)
UROBILINOGEN FLD QL: 1 MG/DL — SIGNIFICANT CHANGE UP (ref 0.2–1)
VANCOMYCIN TROUGH SERPL-MCNC: 15.9 UG/ML — SIGNIFICANT CHANGE UP (ref 10–20)
WBC # BLD: 10.04 K/UL — SIGNIFICANT CHANGE UP (ref 3.8–10.5)
WBC # BLD: 11.98 K/UL — HIGH (ref 3.8–10.5)
WBC # BLD: 12.23 K/UL — HIGH (ref 3.8–10.5)
WBC # BLD: 12.35 K/UL — HIGH (ref 3.8–10.5)
WBC # FLD AUTO: 10.04 K/UL — SIGNIFICANT CHANGE UP (ref 3.8–10.5)
WBC # FLD AUTO: 11.98 K/UL — HIGH (ref 3.8–10.5)
WBC # FLD AUTO: 12.23 K/UL — HIGH (ref 3.8–10.5)
WBC # FLD AUTO: 12.35 K/UL — HIGH (ref 3.8–10.5)
WBC UR QL: >50 /HPF — HIGH (ref 0–5)

## 2024-07-23 PROCEDURE — 93010 ELECTROCARDIOGRAM REPORT: CPT

## 2024-07-23 PROCEDURE — 71045 X-RAY EXAM CHEST 1 VIEW: CPT | Mod: 26

## 2024-07-23 PROCEDURE — 99497 ADVNCD CARE PLAN 30 MIN: CPT | Mod: 25

## 2024-07-23 PROCEDURE — 99223 1ST HOSP IP/OBS HIGH 75: CPT

## 2024-07-23 PROCEDURE — 93306 TTE W/DOPPLER COMPLETE: CPT | Mod: 26

## 2024-07-23 RX ORDER — CLOPIDOGREL BISULFATE 75 MG/1
75 TABLET, FILM COATED ORAL DAILY
Refills: 0 | Status: DISCONTINUED | OUTPATIENT
Start: 2024-07-23 | End: 2024-07-26

## 2024-07-23 RX ORDER — PHENYLEPHRINE HYDROCHLORIDE 10 MG/1
0.5 TABLET, FILM COATED ORAL
Qty: 40 | Refills: 0 | Status: DISCONTINUED | OUTPATIENT
Start: 2024-07-23 | End: 2024-07-25

## 2024-07-23 RX ORDER — HEPARIN SODIUM 1000 [USP'U]/ML
5000 INJECTION, SOLUTION INTRAVENOUS; SUBCUTANEOUS EVERY 12 HOURS
Refills: 0 | Status: DISCONTINUED | OUTPATIENT
Start: 2024-07-23 | End: 2024-07-23

## 2024-07-23 RX ORDER — ATORVASTATIN CALCIUM 40 MG/1
80 TABLET, FILM COATED ORAL AT BEDTIME
Refills: 0 | Status: DISCONTINUED | OUTPATIENT
Start: 2024-07-23 | End: 2024-07-26

## 2024-07-23 RX ORDER — PANTOPRAZOLE SODIUM 20 MG/1
40 TABLET, DELAYED RELEASE ORAL DAILY
Refills: 0 | Status: DISCONTINUED | OUTPATIENT
Start: 2024-07-23 | End: 2024-07-27

## 2024-07-23 RX ORDER — HEPARIN SODIUM 1000 [USP'U]/ML
INJECTION, SOLUTION INTRAVENOUS; SUBCUTANEOUS
Qty: 25000 | Refills: 0 | Status: DISCONTINUED | OUTPATIENT
Start: 2024-07-23 | End: 2024-07-24

## 2024-07-23 RX ORDER — BUPIVACAINE HCL/0.9 % NACL/PF 0.25 %
0.05 PLASTIC BAG, INJECTION (ML) EPIDURAL
Qty: 8 | Refills: 0 | Status: DISCONTINUED | OUTPATIENT
Start: 2024-07-23 | End: 2024-07-23

## 2024-07-23 RX ORDER — DEXTROSE MONOHYDRATE, SODIUM CHLORIDE, SODIUM LACTATE, CALCIUM CHLORIDE, MAGNESIUM CHLORIDE 1.5; 538; 448; 18.4; 5.08 G/100ML; MG/100ML; MG/100ML; MG/100ML; MG/100ML
1000 SOLUTION INTRAPERITONEAL
Refills: 0 | Status: DISCONTINUED | OUTPATIENT
Start: 2024-07-23 | End: 2024-07-24

## 2024-07-23 RX ORDER — DIGOXIN 125 MCG
125 TABLET ORAL ONCE
Refills: 0 | Status: COMPLETED | OUTPATIENT
Start: 2024-07-23 | End: 2024-07-23

## 2024-07-23 RX ORDER — HEPARIN SODIUM 1000 [USP'U]/ML
4000 INJECTION, SOLUTION INTRAVENOUS; SUBCUTANEOUS EVERY 6 HOURS
Refills: 0 | Status: DISCONTINUED | OUTPATIENT
Start: 2024-07-23 | End: 2024-07-24

## 2024-07-23 RX ORDER — VANCOMYCIN HYDROCHLORIDE 5 G/100ML
1000 INJECTION, POWDER, LYOPHILIZED, FOR SOLUTION INTRAVENOUS ONCE
Refills: 0 | Status: COMPLETED | OUTPATIENT
Start: 2024-07-23 | End: 2024-07-23

## 2024-07-23 RX ORDER — ACETAMINOPHEN 500 MG
1000 TABLET ORAL ONCE
Refills: 0 | Status: COMPLETED | OUTPATIENT
Start: 2024-07-23 | End: 2024-07-23

## 2024-07-23 RX ORDER — HEPARIN SODIUM 1000 [USP'U]/ML
2000 INJECTION, SOLUTION INTRAVENOUS; SUBCUTANEOUS EVERY 6 HOURS
Refills: 0 | Status: DISCONTINUED | OUTPATIENT
Start: 2024-07-23 | End: 2024-07-24

## 2024-07-23 RX ADMIN — Medication 5.1 MICROGRAM(S)/KG/MIN: at 00:25

## 2024-07-23 RX ADMIN — PANTOPRAZOLE SODIUM 40 MILLIGRAM(S): 20 TABLET, DELAYED RELEASE ORAL at 11:53

## 2024-07-23 RX ADMIN — DEXTROSE MONOHYDRATE, SODIUM CHLORIDE, SODIUM LACTATE, CALCIUM CHLORIDE, MAGNESIUM CHLORIDE 120 MILLILITER(S): 1.5; 538; 448; 18.4; 5.08 SOLUTION INTRAPERITONEAL at 23:47

## 2024-07-23 RX ADMIN — VANCOMYCIN HYDROCHLORIDE 250 MILLIGRAM(S): 5 INJECTION, POWDER, LYOPHILIZED, FOR SOLUTION INTRAVENOUS at 02:16

## 2024-07-23 RX ADMIN — Medication 1000 MILLIGRAM(S): at 23:13

## 2024-07-23 RX ADMIN — HEPARIN SODIUM 5000 UNIT(S): 1000 INJECTION, SOLUTION INTRAVENOUS; SUBCUTANEOUS at 05:58

## 2024-07-23 RX ADMIN — Medication 400 MILLIGRAM(S): at 23:10

## 2024-07-23 RX ADMIN — PIPERACILLIN SODIUM, TAZOBACTAM SODIUM 200 GRAM(S): 3; .375 INJECTION, POWDER, LYOPHILIZED, FOR SOLUTION INTRAVENOUS at 02:19

## 2024-07-23 RX ADMIN — VANCOMYCIN HYDROCHLORIDE 250 MILLIGRAM(S): 5 INJECTION, POWDER, LYOPHILIZED, FOR SOLUTION INTRAVENOUS at 23:47

## 2024-07-23 RX ADMIN — PIPERACILLIN SODIUM, TAZOBACTAM SODIUM 25 GRAM(S): 3; .375 INJECTION, POWDER, LYOPHILIZED, FOR SOLUTION INTRAVENOUS at 06:16

## 2024-07-23 RX ADMIN — DEXTROSE MONOHYDRATE, SODIUM CHLORIDE, SODIUM LACTATE, CALCIUM CHLORIDE, MAGNESIUM CHLORIDE 75 MILLILITER(S): 1.5; 538; 448; 18.4; 5.08 SOLUTION INTRAPERITONEAL at 01:49

## 2024-07-23 RX ADMIN — Medication 125 MICROGRAM(S): at 10:37

## 2024-07-23 RX ADMIN — PHENYLEPHRINE HYDROCHLORIDE 10.2 MICROGRAM(S)/KG/MIN: 10 TABLET, FILM COATED ORAL at 10:37

## 2024-07-23 RX ADMIN — PIPERACILLIN SODIUM, TAZOBACTAM SODIUM 25 GRAM(S): 3; .375 INJECTION, POWDER, LYOPHILIZED, FOR SOLUTION INTRAVENOUS at 22:51

## 2024-07-23 RX ADMIN — HEPARIN SODIUM 1000 UNIT(S)/HR: 1000 INJECTION, SOLUTION INTRAVENOUS; SUBCUTANEOUS at 17:51

## 2024-07-23 NOTE — DIETITIAN INITIAL EVALUATION ADULT - PERTINENT MEDS FT
MEDICATIONS  (STANDING):  atorvastatin 80 milliGRAM(s) Oral at bedtime  clopidogrel Tablet 75 milliGRAM(s) Oral daily  dextrose 5%. 1000 milliLiter(s) (75 mL/Hr) IV Continuous <Continuous>  digoxin  Injectable 125 MICROGram(s) IV Push once  heparin   Injectable 5000 Unit(s) SubCutaneous every 12 hours  levothyroxine Injectable 84 MICROGram(s) IV Push at bedtime  norepinephrine Infusion 0.05 MICROgram(s)/kG/Min (5.1 mL/Hr) IV Continuous <Continuous>  pantoprazole  Injectable 40 milliGRAM(s) IV Push daily  phenylephrine    Infusion 0.5 MICROgram(s)/kG/Min (10.2 mL/Hr) IV Continuous <Continuous>  piperacillin/tazobactam IVPB.. 3.375 Gram(s) IV Intermittent every 12 hours    MEDICATIONS  (PRN):

## 2024-07-23 NOTE — CONSULT NOTE ADULT - ASSESSMENT
A/P 89y old  Female who presents with a chief complaint of AMS, with h/o CAD s/p CABG, DM2, HTN, High chol, who was sent from [facility] with AMS, and decreased PO intake x 1 week.     Patient currently lethargic unable to provide history or ros         Assessment  Metabolic Encephelopathy  Shock combination of both septic and hypovolumic requiring pressors  UTI  MARGARET with normal baseline with hypernatremia.  New Afib  Abnormal CT chest abd pelvis      - Hiatal hernia     - Nonobstructive right renal stone versus vascular calcification.     - Acute or subacute L1 fracture.     - Left femoral subcapital fracture with overlying soft tissue contusion, likely acute.     - Markedly dilated rectum with perirectal stranding, concerning for stercoral colitis.     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    Underlying HTN, DM2, High chol, Hypothyroidism       Plan  IVF   Consider NGT placement  noted Afib with RVR   Ortho eval for  Left femoral subcapital fracture with overlying soft tissue contusion, Acute or subacute L1 fracture.  aspiration precautions    Goc:  Molst Dnr/Dni     Palliative :   asked for goc /support assist , chart reviewed for hx, tests, consults, and  case d/w ccu team, Dr Langford   today .  Saw pt bedside in ccu , she was unresponsive, and non verbal , but not in any distress.  GOC established for Dnr/Dni ,  Spoke w/ son Norris today ,  reviewed w/ son  if pt does not respond to treatments, son to consider comfort/home hospice at [facility].  This is pending pts clinical course.   Will  cont to follow w/ ccu team.

## 2024-07-23 NOTE — CONSULT NOTE ADULT - CONVERSATION DETAILS
Spoke w/ son Norris today , explained role of palliative care . Son said he is updated by ccu team today and aware of pt present condition. Son is main care taker, pt lives in [facility], but son visits often and aware of pt hx. Son says pt is usually alert, oriented, converses , he saw her on Sunday at this baseline. he reports when his mother gets uti, her mentation is usually affected and she normally can bounce back . Son says mom usually eats well but is on a modified type pureed diet . We spoke about if mom does not return to her baseline, that it may be time to consider less aggressive care and to focus on Mom's comfort. I discussed that home hospice could be an option for her at her [facility]. Son said he would agree if mom does not recover well, and that he has been speaking to people at the [facility] about this as well.  Son has Molst in place for dnr/dni , and he does not want a Peg tube used, but will wait to decide on use of Ng at the time.  I gave him  my direct contact infor for any questions or to continue this discussion . I explained I would be following w/ the medical teams, Son appreciative .
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
## 2024-07-23 NOTE — DIETITIAN INITIAL EVALUATION ADULT - OTHER INFO
89F PMHx HTN, HLD, T2DM, Hypothyroidism presents from Facility with AMS, decreased PO intake x1wk. Patient admitted with acute encephalopathy, hypernatremia, NPO with  D5% @ 75ml/hr infusing , hypoactive BS noted , skin ecchymotic / IAD  , no edema, (-) occult blood , hx of DM noted , NFPE conducted patient with evidence of severe protein calorie malnutrition (+) muscle wasting /loss of body fat. ? need for NGT feeds , Labs reviewed, hypernatremia /  MARGARET in setting of dehydration. May suggest initiate Jevity 1.5 @ 20ml/hr titrate slowly to 50 ml/hr which will provide 1350kcals, 57gmsprotein ,[id number] with 200ml q 6hrs , monitor phos, Mg , potassium for possible refeeding .

## 2024-07-23 NOTE — DIETITIAN INITIAL EVALUATION ADULT - ENTERAL
NGT feeds of Jevity 1.5 @ 20ml/hr advance q 10 hrs to 50ml/hr x 18hrs with 200ml free water flush q 6 hrs ,
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
## 2024-07-23 NOTE — DIETITIAN INITIAL EVALUATION ADULT - NUTRITION DIAGNOSIS
Pt is here c/o left elbow pain onset today   Pt reports he hit his elbow on a metal pole while he was at work   Pt is unable to straighten his elbow complettala pt is having pain   This is a work comp visit  
yes...

## 2024-07-23 NOTE — CONSULT NOTE ADULT - ASSESSMENT
89y old  Female who presents with a chief complaint of AMS, with h/o CAD s/p CABG, DM2, HTN, High chol, who was sent from [facility] with AMS, and decreased PO intake x 1 week. Patient currently lethargic unable to provide history or ros  Orthopedic surgery consulted for Left femoral neck fx  89y old  Female who presents with a chief complaint of AMS, with h/o CAD s/p CABG, DM2, HTN, High chol, who was sent from [facility] with AMS, and decreased PO intake x 1 week. Patient currently lethargic unable to provide history or ros  Orthopedic surgery consulted for Left femoral neck fx  CT imaging reviewed by Dr. Coker  Left minimally displaced femoral neck fracture with callus formation  Patient is non ambulatory and not an operative candidate  No urgent orthopedic intervention  Care per primary team
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
## 2024-07-23 NOTE — DIETITIAN INITIAL EVALUATION ADULT - PERTINENT LABORATORY DATA
07-23    159<H>  |  122<H>  |  90<H>  ----------------------------<  168<H>  4.1   |  29  |  3.97<H>    Ca    8.6      23 Jul 2024 06:06  Phos  4.8     07-23  Mg     2.3     07-23    TPro  6.2  /  Alb  2.5<L>  /  TBili  0.7  /  DBili  x   /  AST  50<H>  /  ALT  16  /  AlkPhos  139<H>  07-23

## 2024-07-23 NOTE — CONSULT NOTE ADULT - PROBLEM SELECTOR RECOMMENDATION 9
Hip xray of the Left to further evaluate extend of fracture  Pt with contractures likely non ambulatory  NWB on LLE  will follow until xrays   discussed with surgeon
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
## 2024-07-23 NOTE — PROGRESS NOTE ADULT - ASSESSMENT
Pt is a 85 y/o F pmhx of HTN, HLD, DM2, hypothyroidism who presented to [facility] for AMS and decreased PO intake X1week. CTH negative, CT chest w/ L sided atelectasis, CT abdomen pelvis w/ suspected stercoral colitis. Pt placed on NRB in ED, labs in ED showed MARGARET/ARF w/ Cr. of 4.19, Na of 161, pt admitted to ICU for further eval.     1. Septic shock   2. MARGARET/ARF  3. Hypernatremia   4. Thrombocytopenia   5. Metabolic encephalopathy   6. Colitis   7. Afib     Plan:   Neuro: Metabolic encephalopathy likely secondary to sepsis, avoid sedating medication.     CV: Septic shock remains on phenylephrine gtt for HD support, actively titrating to maintain MAP>65.     Pulm: Satting well on 2L NC, continue to monitor and supplement to maintain SpO2>92%.     GI: Diet: NPO, Protonix for GI PPX     Renal: MARGARET/ARF, continue on D5 infusion, continue to monitor and avoid nephrotoxic meds.     Endo: Glucose <180, mag>2, K>4 for arrythmia suppression     Heme: new onset Afib started on full dose heparin gtt, monitor via ptt     ID: Septic shock cultures sent, on empiric zosyn and vanco, narrow abx as indicated, trend markers of infection daily.     DATE OF ENTRY OF THIS NOTE IS EQUAL TO THE DATE OF SERVICES RENDERED

## 2024-07-23 NOTE — CONSULT NOTE ADULT - NS ATTEND AMEND GEN_ALL_CORE FT
89y old  Female who presents with a chief complaint of AMS, with h/o CAD s/p CABG, DM2, HTN, High chol, who was sent from [facility] with AMS, and decreased PO intake x 1 week. Patient currently lethargic unable to provide history or ros  CT imaging reviewed by me.  Left minimally displaced femoral neck fracture with callus formation consistent with chronic vs subacute injury  Patient is non ambulatory and not an operative candidate  No urgent orthopedic intervention  Care per primary team
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
## 2024-07-23 NOTE — PATIENT PROFILE ADULT - FALL HARM RISK - HARM RISK INTERVENTIONS

## 2024-07-24 LAB
A1C WITH ESTIMATED AVERAGE GLUCOSE RESULT: 5.2 % — SIGNIFICANT CHANGE UP (ref 4–5.6)
ANION GAP SERPL CALC-SCNC: 10 MMOL/L — SIGNIFICANT CHANGE UP (ref 5–17)
ANION GAP SERPL CALC-SCNC: 10 MMOL/L — SIGNIFICANT CHANGE UP (ref 5–17)
ANION GAP SERPL CALC-SCNC: 7 MMOL/L — SIGNIFICANT CHANGE UP (ref 5–17)
ANION GAP SERPL CALC-SCNC: 7 MMOL/L — SIGNIFICANT CHANGE UP (ref 5–17)
APTT BLD: 125.6 SEC — SIGNIFICANT CHANGE UP (ref 24.5–35.6)
APTT BLD: 30.6 SEC — SIGNIFICANT CHANGE UP (ref 24.5–35.6)
APTT BLD: >200 SEC — CRITICAL HIGH (ref 24.5–35.6)
APTT BLD: >200 SEC — CRITICAL HIGH (ref 24.5–35.6)
BUN SERPL-MCNC: 78 MG/DL — HIGH (ref 7–23)
BUN SERPL-MCNC: 84 MG/DL — HIGH (ref 7–23)
BUN SERPL-MCNC: 86 MG/DL — HIGH (ref 7–23)
BUN SERPL-MCNC: 87 MG/DL — HIGH (ref 7–23)
CALCIUM SERPL-MCNC: 8.1 MG/DL — LOW (ref 8.4–10.5)
CALCIUM SERPL-MCNC: 8.1 MG/DL — LOW (ref 8.4–10.5)
CALCIUM SERPL-MCNC: 8.3 MG/DL — LOW (ref 8.4–10.5)
CALCIUM SERPL-MCNC: 8.4 MG/DL — SIGNIFICANT CHANGE UP (ref 8.4–10.5)
CHLORIDE SERPL-SCNC: 108 MMOL/L — SIGNIFICANT CHANGE UP (ref 96–108)
CHLORIDE SERPL-SCNC: 111 MMOL/L — HIGH (ref 96–108)
CHLORIDE SERPL-SCNC: 114 MMOL/L — HIGH (ref 96–108)
CHLORIDE SERPL-SCNC: 116 MMOL/L — HIGH (ref 96–108)
CO2 SERPL-SCNC: 23 MMOL/L — SIGNIFICANT CHANGE UP (ref 22–31)
CO2 SERPL-SCNC: 26 MMOL/L — SIGNIFICANT CHANGE UP (ref 22–31)
CO2 SERPL-SCNC: 26 MMOL/L — SIGNIFICANT CHANGE UP (ref 22–31)
CO2 SERPL-SCNC: 28 MMOL/L — SIGNIFICANT CHANGE UP (ref 22–31)
CORTIS AM PEAK SERPL-MCNC: 11.9 UG/DL — SIGNIFICANT CHANGE UP (ref 6–18.4)
CREAT SERPL-MCNC: 3.38 MG/DL — HIGH (ref 0.5–1.3)
CREAT SERPL-MCNC: 3.71 MG/DL — HIGH (ref 0.5–1.3)
CREAT SERPL-MCNC: 3.88 MG/DL — HIGH (ref 0.5–1.3)
CREAT SERPL-MCNC: 3.96 MG/DL — HIGH (ref 0.5–1.3)
CULTURE RESULTS: SIGNIFICANT CHANGE UP
EGFR: 10 ML/MIN/1.73M2 — LOW
EGFR: 10 ML/MIN/1.73M2 — LOW
EGFR: 11 ML/MIN/1.73M2 — LOW
EGFR: 12 ML/MIN/1.73M2 — LOW
ESTIMATED AVERAGE GLUCOSE: 103 MG/DL — SIGNIFICANT CHANGE UP (ref 68–114)
GLUCOSE BLDC GLUCOMTR-MCNC: 151 MG/DL — HIGH (ref 70–99)
GLUCOSE BLDC GLUCOMTR-MCNC: 169 MG/DL — HIGH (ref 70–99)
GLUCOSE SERPL-MCNC: 150 MG/DL — HIGH (ref 70–99)
GLUCOSE SERPL-MCNC: 159 MG/DL — HIGH (ref 70–99)
GLUCOSE SERPL-MCNC: 170 MG/DL — HIGH (ref 70–99)
GLUCOSE SERPL-MCNC: 234 MG/DL — HIGH (ref 70–99)
HCT VFR BLD CALC: 28.6 % — LOW (ref 34.5–45)
HCT VFR BLD CALC: 29 % — LOW (ref 34.5–45)
HCT VFR BLD CALC: 29.1 % — LOW (ref 34.5–45)
HGB BLD-MCNC: 8.6 G/DL — LOW (ref 11.5–15.5)
HGB BLD-MCNC: 8.6 G/DL — LOW (ref 11.5–15.5)
HGB BLD-MCNC: 8.7 G/DL — LOW (ref 11.5–15.5)
INR BLD: 1.32 RATIO — HIGH (ref 0.85–1.18)
MAGNESIUM SERPL-MCNC: 1.8 MG/DL — SIGNIFICANT CHANGE UP (ref 1.6–2.6)
MAGNESIUM SERPL-MCNC: 2 MG/DL — SIGNIFICANT CHANGE UP (ref 1.6–2.6)
MAGNESIUM SERPL-MCNC: 2.1 MG/DL — SIGNIFICANT CHANGE UP (ref 1.6–2.6)
MAGNESIUM SERPL-MCNC: 2.1 MG/DL — SIGNIFICANT CHANGE UP (ref 1.6–2.6)
MCHC RBC-ENTMCNC: 28.1 PG — SIGNIFICANT CHANGE UP (ref 27–34)
MCHC RBC-ENTMCNC: 28.2 PG — SIGNIFICANT CHANGE UP (ref 27–34)
MCHC RBC-ENTMCNC: 28.9 PG — SIGNIFICANT CHANGE UP (ref 27–34)
MCHC RBC-ENTMCNC: 29.7 GM/DL — LOW (ref 32–36)
MCHC RBC-ENTMCNC: 29.9 GM/DL — LOW (ref 32–36)
MCHC RBC-ENTMCNC: 30.1 GM/DL — LOW (ref 32–36)
MCV RBC AUTO: 94.5 FL — SIGNIFICANT CHANGE UP (ref 80–100)
MCV RBC AUTO: 94.8 FL — SIGNIFICANT CHANGE UP (ref 80–100)
MCV RBC AUTO: 96 FL — SIGNIFICANT CHANGE UP (ref 80–100)
MRSA PCR RESULT.: SIGNIFICANT CHANGE UP
MRSA PCR RESULT.: SIGNIFICANT CHANGE UP
NRBC # BLD: 0 /100 WBCS — SIGNIFICANT CHANGE UP (ref 0–0)
PHOSPHATE SERPL-MCNC: 3.6 MG/DL — SIGNIFICANT CHANGE UP (ref 2.5–4.5)
PHOSPHATE SERPL-MCNC: 3.7 MG/DL — SIGNIFICANT CHANGE UP (ref 2.5–4.5)
PHOSPHATE SERPL-MCNC: 4 MG/DL — SIGNIFICANT CHANGE UP (ref 2.5–4.5)
PHOSPHATE SERPL-MCNC: 4 MG/DL — SIGNIFICANT CHANGE UP (ref 2.5–4.5)
PLATELET # BLD AUTO: 181 K/UL — SIGNIFICANT CHANGE UP (ref 150–400)
PLATELET # BLD AUTO: 190 K/UL — SIGNIFICANT CHANGE UP (ref 150–400)
PLATELET # BLD AUTO: 214 K/UL — SIGNIFICANT CHANGE UP (ref 150–400)
POTASSIUM SERPL-MCNC: 3.4 MMOL/L — LOW (ref 3.5–5.3)
POTASSIUM SERPL-MCNC: 3.5 MMOL/L — SIGNIFICANT CHANGE UP (ref 3.5–5.3)
POTASSIUM SERPL-MCNC: 3.5 MMOL/L — SIGNIFICANT CHANGE UP (ref 3.5–5.3)
POTASSIUM SERPL-MCNC: 3.8 MMOL/L — SIGNIFICANT CHANGE UP (ref 3.5–5.3)
POTASSIUM SERPL-SCNC: 3.4 MMOL/L — LOW (ref 3.5–5.3)
POTASSIUM SERPL-SCNC: 3.5 MMOL/L — SIGNIFICANT CHANGE UP (ref 3.5–5.3)
POTASSIUM SERPL-SCNC: 3.5 MMOL/L — SIGNIFICANT CHANGE UP (ref 3.5–5.3)
POTASSIUM SERPL-SCNC: 3.8 MMOL/L — SIGNIFICANT CHANGE UP (ref 3.5–5.3)
PROTHROM AB SERPL-ACNC: 15.3 SEC — HIGH (ref 9.5–13)
RBC # BLD: 2.98 M/UL — LOW (ref 3.8–5.2)
RBC # BLD: 3.06 M/UL — LOW (ref 3.8–5.2)
RBC # BLD: 3.08 M/UL — LOW (ref 3.8–5.2)
RBC # FLD: 16 % — HIGH (ref 10.3–14.5)
RBC # FLD: 16.1 % — HIGH (ref 10.3–14.5)
RBC # FLD: 16.1 % — HIGH (ref 10.3–14.5)
S AUREUS DNA NOSE QL NAA+PROBE: DETECTED
S AUREUS DNA NOSE QL NAA+PROBE: SIGNIFICANT CHANGE UP
SODIUM SERPL-SCNC: 141 MMOL/L — SIGNIFICANT CHANGE UP (ref 135–145)
SODIUM SERPL-SCNC: 144 MMOL/L — SIGNIFICANT CHANGE UP (ref 135–145)
SODIUM SERPL-SCNC: 149 MMOL/L — HIGH (ref 135–145)
SODIUM SERPL-SCNC: 152 MMOL/L — HIGH (ref 135–145)
SPECIMEN SOURCE: SIGNIFICANT CHANGE UP
VANCOMYCIN TROUGH SERPL-MCNC: 19.1 UG/ML — SIGNIFICANT CHANGE UP (ref 10–20)
WBC # BLD: 10.7 K/UL — HIGH (ref 3.8–10.5)
WBC # BLD: 11.2 K/UL — HIGH (ref 3.8–10.5)
WBC # BLD: 12.47 K/UL — HIGH (ref 3.8–10.5)
WBC # FLD AUTO: 10.7 K/UL — HIGH (ref 3.8–10.5)
WBC # FLD AUTO: 11.2 K/UL — HIGH (ref 3.8–10.5)
WBC # FLD AUTO: 12.47 K/UL — HIGH (ref 3.8–10.5)

## 2024-07-24 PROCEDURE — 99232 SBSQ HOSP IP/OBS MODERATE 35: CPT

## 2024-07-24 PROCEDURE — 93010 ELECTROCARDIOGRAM REPORT: CPT

## 2024-07-24 PROCEDURE — 73501 X-RAY EXAM HIP UNI 1 VIEW: CPT | Mod: 26,LT

## 2024-07-24 PROCEDURE — 71045 X-RAY EXAM CHEST 1 VIEW: CPT | Mod: 26

## 2024-07-24 RX ORDER — POTASSIUM CHLORIDE 1500 MG/1
10 TABLET, EXTENDED RELEASE ORAL
Refills: 0 | Status: COMPLETED | OUTPATIENT
Start: 2024-07-24 | End: 2024-07-24

## 2024-07-24 RX ORDER — DEXTROSE MONOHYDRATE, SODIUM CHLORIDE, SODIUM LACTATE, CALCIUM CHLORIDE, MAGNESIUM CHLORIDE 1.5; 538; 448; 18.4; 5.08 G/100ML; MG/100ML; MG/100ML; MG/100ML; MG/100ML
1000 SOLUTION INTRAPERITONEAL
Refills: 0 | Status: DISCONTINUED | OUTPATIENT
Start: 2024-07-24 | End: 2024-07-25

## 2024-07-24 RX ADMIN — POTASSIUM CHLORIDE 100 MILLIEQUIVALENT(S): 1500 TABLET, EXTENDED RELEASE ORAL at 10:43

## 2024-07-24 RX ADMIN — Medication 84 MICROGRAM(S): at 00:24

## 2024-07-24 RX ADMIN — Medication 84 MICROGRAM(S): at 21:02

## 2024-07-24 RX ADMIN — POTASSIUM CHLORIDE 100 MILLIEQUIVALENT(S): 1500 TABLET, EXTENDED RELEASE ORAL at 11:44

## 2024-07-24 RX ADMIN — DEXTROSE MONOHYDRATE, SODIUM CHLORIDE, SODIUM LACTATE, CALCIUM CHLORIDE, MAGNESIUM CHLORIDE 120 MILLILITER(S): 1.5; 538; 448; 18.4; 5.08 SOLUTION INTRAPERITONEAL at 07:36

## 2024-07-24 RX ADMIN — HEPARIN SODIUM 0 UNIT(S)/HR: 1000 INJECTION, SOLUTION INTRAVENOUS; SUBCUTANEOUS at 00:15

## 2024-07-24 RX ADMIN — PIPERACILLIN SODIUM, TAZOBACTAM SODIUM 25 GRAM(S): 3; .375 INJECTION, POWDER, LYOPHILIZED, FOR SOLUTION INTRAVENOUS at 17:14

## 2024-07-24 RX ADMIN — PANTOPRAZOLE SODIUM 40 MILLIGRAM(S): 20 TABLET, DELAYED RELEASE ORAL at 11:34

## 2024-07-24 RX ADMIN — HEPARIN SODIUM 600 UNIT(S)/HR: 1000 INJECTION, SOLUTION INTRAVENOUS; SUBCUTANEOUS at 08:53

## 2024-07-24 RX ADMIN — PIPERACILLIN SODIUM, TAZOBACTAM SODIUM 25 GRAM(S): 3; .375 INJECTION, POWDER, LYOPHILIZED, FOR SOLUTION INTRAVENOUS at 05:11

## 2024-07-24 RX ADMIN — POTASSIUM CHLORIDE 100 MILLIEQUIVALENT(S): 1500 TABLET, EXTENDED RELEASE ORAL at 08:57

## 2024-07-24 RX ADMIN — DEXTROSE MONOHYDRATE, SODIUM CHLORIDE, SODIUM LACTATE, CALCIUM CHLORIDE, MAGNESIUM CHLORIDE 100 MILLILITER(S): 1.5; 538; 448; 18.4; 5.08 SOLUTION INTRAPERITONEAL at 14:56

## 2024-07-24 RX ADMIN — DEXTROSE MONOHYDRATE, SODIUM CHLORIDE, SODIUM LACTATE, CALCIUM CHLORIDE, MAGNESIUM CHLORIDE 150 MILLILITER(S): 1.5; 538; 448; 18.4; 5.08 SOLUTION INTRAPERITONEAL at 08:52

## 2024-07-24 RX ADMIN — HEPARIN SODIUM 0 UNIT(S)/HR: 1000 INJECTION, SOLUTION INTRAVENOUS; SUBCUTANEOUS at 07:35

## 2024-07-24 RX ADMIN — HEPARIN SODIUM 800 UNIT(S)/HR: 1000 INJECTION, SOLUTION INTRAVENOUS; SUBCUTANEOUS at 01:16

## 2024-07-24 NOTE — SWALLOW BEDSIDE ASSESSMENT ADULT - SWALLOW EVAL: DIAGNOSIS
Pt presenting with clinical signs of oropharyngeal dysphagia exacerbated by deconditioning and cognition. Reduced ability to completed oral mech evaluation but able to elicit pharyngeal swallow on command. Provided ice chip via teaspoon, patient with absent attempt to strip ice chip from spoon or manipulate in oral cavity resulting in anterior loss. Pooling of melted ice water in anterior sulcus without attempt to collect, manipulate and swallow. Due to presentation on this date, recommend continue NPO - consider short term alternative means of nutrition/hydration/medication.

## 2024-07-24 NOTE — PROGRESS NOTE ADULT - SUBJECTIVE AND OBJECTIVE BOX
CC: f/u for low grade fever    Patient reports: she is poorly interactive, not very verbal.    REVIEW OF SYSTEMS:  All other review of systems negative (Comprehensive ROS): limited by condition    Antimicrobials Day #  :day 3  piperacillin/tazobactam IVPB.. 3.375 Gram(s) IV Intermittent every 12 hours    Other Medications Reviewed  MEDICATIONS  (STANDING):  atorvastatin 80 milliGRAM(s) Oral at bedtime  clopidogrel Tablet 75 milliGRAM(s) Oral daily  dextrose 5%. 1000 milliLiter(s) (150 mL/Hr) IV Continuous <Continuous>  heparin  Infusion.  Unit(s)/Hr (10 mL/Hr) IV Continuous <Continuous>  levothyroxine Injectable 84 MICROGram(s) IV Push at bedtime  pantoprazole  Injectable 40 milliGRAM(s) IV Push daily  phenylephrine    Infusion 0.5 MICROgram(s)/kG/Min (10.2 mL/Hr) IV Continuous <Continuous>  piperacillin/tazobactam IVPB.. 3.375 Gram(s) IV Intermittent every 12 hours  potassium chloride  10 mEq/100 mL IVPB 10 milliEquivalent(s) IV Intermittent every 1 hour    T(F): 97.7 (07-24-24 @ 11:00), Max: 100.9 (07-23-24 @ 21:30)  HR: 77 (07-24-24 @ 11:00)  BP: 94/71 (07-24-24 @ 11:00)  RR: 26 (07-24-24 @ 11:00)  SpO2: 97% (07-24-24 @ 09:45)  Wt(kg): --    PHYSICAL EXAM:  General: awake, no acute distress  Eyes:  anicteric, no conjunctival injection, no discharge  Oropharynx: no lesions or injection 	  Neck: supple, without adenopathy  Lungs: clear to auscultation, poor inspiratory  effort   Heart: irregular rate and rhythm; no murmur  Abdomen: soft, nondistended, nontender, without mass or organomegaly  Skin: no lesions  Extremities: no clubbing, cyanosis, or edema  Neurologic: marginally interactive     LAB RESULTS:                        8.7    11.20 )-----------( 190      ( 24 Jul 2024 05:30 )             29.1     07-24    149<H>  |  114<H>  |  87<H>  ----------------------------<  170<H>  3.4<L>   |  28  |  3.88<H>    Ca    8.4      24 Jul 2024 05:30  Phos  4.0     07-24  Mg     2.1     07-24    TPro  6.2  /  Alb  2.5<L>  /  TBili  0.7  /  DBili  x   /  AST  50<H>  /  ALT  16  /  AlkPhos  139<H>  07-23    LIVER FUNCTIONS - ( 23 Jul 2024 06:06 )  Alb: 2.5 g/dL / Pro: 6.2 g/dL / ALK PHOS: 139 U/L / ALT: 16 U/L / AST: 50 U/L / GGT: x                 MICROBIOLOGY:  RECENT CULTURES:  07-22 @ 20:55 .Blood Blood-Peripheral     No growth at 24 hours      07-22 @ 20:50 .Blood Blood-Peripheral     No growth at 24 hours          RADIOLOGY REVIEWED:  < from: Xray Chest 1 View-PORTABLE IMMEDIATE (Xray Chest 1 View-PORTABLE IMMEDIATE .) (07.23.24 @ 11:36) >  PROCEDURE DATE:  07/23/2024          INTERPRETATION:  AP chest on July 23, 2024 at 11:07 AM. Patient had NG   tube placement.    Film rotated to the left. Heart magnified by technique. Clips along the   left heart border again noted.    Clips in the right upper chest again seen.    NG tube is been inserted. Tip is in the mid chest and may not be in the   esophagus. No gross infiltrate.    IMPRESSION: NG tube tip is in the mid chest and may not be in the   esophagus.    --- End of Report ---    < end of copied text >  < from: CT Abdomen and Pelvis No Cont (07.22.24 @ 22:35) >  IMPRESSION:  Nonobstructing right renal stone versus vascular calcification.  Acute or subacute L1 fracture.  Left femoral subcapital fracture with overlying soft tissue contusion,   likely acute.  Markedly dilated rectum with perirectal stranding, concerning for   stercoral colitis.  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 However there is no   evidence of obstruction or other acute complication from the hernia.    Left basilar atelectasis overlying the hernia similar to prior. No   pneumonia or edema or acute abnormality in the lungs.    Other chronic findings as above.    < end of copied text >
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
## 2024-07-24 NOTE — SWALLOW BEDSIDE ASSESSMENT ADULT - SLP GENERAL OBSERVATIONS
Received in bed on 2L O2 via nasal cannula. SPO2 stable throughout. Epistaxis throughout evaluation - RN aware and assisted. Unable to follow commands for purpose of OME but able to report name only.

## 2024-07-24 NOTE — SWALLOW BEDSIDE ASSESSMENT ADULT - COMMENTS
WBC: 11.20  CXR 7/23: IMPRESSION: NG tube tip is in the mid chest and may not be in the   esophagus.    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 However there is no   evidence of obstruction or other acute complication from the hernia.    Left basilar atelectasis overlying the hernia similar to prior. No   pneumonia or edema or acute abnormality in the lungs. Other chronic findings as above.  CT Head 7/22: IMPRESSION:  No acute intracranial hemorrhage, mass effect, or midline shift.

## 2024-07-24 NOTE — SWALLOW BEDSIDE ASSESSMENT ADULT - SLP PERTINENT HISTORY OF CURRENT PROBLEM
89F PMHx HTN, HLD, T2DM, Hypothyroidism presents from Facility with AMS, decreased PO intake x1wk. Metabolic Encephelopathy, Shock combination of both septic and hypovolumic requiring pressors, UTi, MARGARET with normal baseline with hypernatremia, new afib,

## 2024-07-24 NOTE — SWALLOW BEDSIDE ASSESSMENT ADULT - ASR SWALLOW LABIAL MOBILITY
unable to fully assess due to cognition f/u peds in 2 days and prn  encourage frequent feeds/ breastfeeding

## 2024-07-24 NOTE — PROGRESS NOTE ADULT - ASSESSMENT
A/P 89y old  Female who presents with a chief complaint of AMS, with h/o CAD s/p CABG, DM2, HTN, High chol, who was sent from [facility] with AMS, and decreased PO intake x 1 week.         More awake  today, speaking few words , but  remains on low dose pressors   difficult NGT placement - staff unable to pass due to coiling in mouth  still weak, lethargy     Assessment  Metabolic Encephelopathy  Shock combination of both septic and hypovolumic   on pressors  UTI  MARGARET with normal baseline with hypernatremia.  New Afib  Abnormal CT chest abd pelvis      - Hiatal hernia     - Nonobstructive right renal stone versus vascular calcification.     - Acute or subacute L1 fracture     - Left femoral subcapital fracture with overlying soft tissue contusion, likely acute.    Underlying HTN, DM2, High chol, Hypothyroidism     Plans;   Ortho eval done/noted -  Left minimally displaced femoral neck fracture with callus formation consistent with chronic vs subacute injury, Patient is non ambulatory and not an operative candidate, No urgent orthopedic intervention  aspiration precautions  S&S  RE- eval  cont present care   cont IVF      Palliative : as a f/u today , discussed pt w/ ccu team Dr Langford today , pt more awake today , speaking at times.  I reviewed chart for tests, consults. I saw pt bedside, she was awake, alert, and spoke to me, oriented 2/3. But remains very weakened, in/out of sleep .  has failed s/s eval today - ehsan need re - eval now more awake,   Met and spoke w/ Norris at bedside this afternoon, he is main caretaker, son Khris is out of town .  Norris reports pt would not want Ng tube placed , so no alt means of nutrition.   They  are happy she is waking up and   hope to have her back to her  [facility] when able.  We did discuss pt overall is very weakened , debilitated , w/ likely FTT, and at this time , we did discuss  hospice services,  family / care taker is familiar w/ hospice services and in agreement .   Would like speech to re eval as well to see if pt can have some oral diet., as they do not want feeding tubes used.    Will cont to follow w/ ccu team for goc/ support,  and cont d/w family.   Likely for hospice eval by end of this week , pt from [facility] .

## 2024-07-24 NOTE — PROGRESS NOTE ADULT - ASSESSMENT
Physical Examination:  GENERAL:               Lethargic, minimal verbal No acute distress.    HEENT:                   No JVD, Dry MM  PULM:                     Bilateral air entry, Clear to auscultation bilaterally, no significant sputum production, +Rales, No Rhonchi, No Wheezing  CVS:                         S1, S2,  +Murmur  ABD:                        Soft,  distended, nontender, normoactive bowel sounds,   EXT:                         No edema, nontender, No Cyanosis or Clubbing    NEURO:                  Lethargic minimal verbal does not follow commands   PSYC:                      Calm, No Insight.        Assessment  Metabolic Encephelopathy  Shock combination of both septic and hypovolumic requiring pressors  UTI  MARGARET with normal baseline with hypernatremia.  New Afib  Abnormal CT chest abd pelvis      - Hiatal hernia     - Nonobstructive right renal stone versus vascular calcification.     - Acute or subacute L1 fracture.     - Left femoral subcapital fracture with overlying soft tissue contusion, likely acute.     - Markedly dilated rectum with perirectal stranding, concerning for stercoral colitis.     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    Underlying HTN, DM2, High chol, Hypothyroidism           Plan  IVF will increase D5  trend labs Q 8  Monitor neuro status  NGT placement - may be difficult as has hiatal hernia and on a/c  TSH as above - need to repeat when more stable    HR controlled today remains on afib on monitor     on heparin drip    check EKG     Ortho eval -  Left minimally displaced femoral neck fracture with callus formation consistent with chronic vs subacute injury, Patient is non ambulatory and not an operative candidate, No urgent orthopedic intervention  aspiration precautions  S&S eval    check vanco level and dose       PMD:		Dr gillette 		                   Notified(Date): 7/23/24  Family Updated: 	Norris [phone]	                                 Date: 7/23    patient is a physician    for past 3 months patient had clinical decline    h/o MRSA infection    difficulty eating - due to dentures and has puree diet,     has had recurrent UTI, with episodes of psychotic episodes with them.         usually vanco works best     not aware of trauma about the trauma ;   patient ambulated up to 2 weeks ago    if not improve will consider hospice.    Sedation & Analgesia:	  Diet/Nutrition:		 Diet, NPO (07-22-24 @ 23:06) [Active]  GI PPx:			pantoprazole  Injectable 40 milliGRAM(s) IV Push daily  DVT Ppx:		heparin   Injectable 5000 Unit(s) SubCutaneous every 12 hours    	RISK                                                          Points  	[ ] Previous VTE                                           	3  	[ ] Thrombophilia                                        	2  	[ ] Lower limb paralysis                              	2   	[ ] Current Cancer                                       	2   	[x ] Immobilization > 24 hrs                        	1  	[ ] ICU/CCU stay > 24 hours                       	1  	[x ] Age > 60                                                   	1  		Total:[2 ]      Activity:		  Advance as tolerated  Head of Bed:               35-45 Deg  Glycemic Control:        n/a      Lines:  PIV  CENTRAL LINE: 	[ ] YES [ ] NO	                    LOCATION:   	                       DATE INSERTED:   	                    REMOVE:  [ ] YES [ ] NO    A-LINE:  	                [ ] YES [ ] NO                      LOCATION:   	                       DATE INSERTED: 		            REMOVE:  [ ] YES [ ] NO    HUERTA: 		        [x ] YES [ ] NO  		                                       DATE INSERTED:		            REMOVE:  [ ] YES [x] NO    // plan to d/c in am if improving renal function    Restraints were deemed necessary to prevent removal of life-sustaining devices [  ] YES   [   x ]  NO    Disposition: ICU Care     Goals of Care: dnr/i, palliative following   Physical Examination:  GENERAL:               Lethargic, minimal verbal No acute distress.    HEENT:                   No JVD, Dry MM  PULM:                     Bilateral air entry, Clear to auscultation bilaterally, no significant sputum production, +Rales, No Rhonchi, No Wheezing  CVS:                         S1, S2,  +Murmur  ABD:                        Soft,  distended, nontender, normoactive bowel sounds,   EXT:                         No edema, nontender, No Cyanosis or Clubbing    NEURO:                  Lethargic minimal verbal does not follow commands   PSYC:                      Calm, No Insight.        Assessment  Metabolic Encephelopathy  Shock combination of both septic and hypovolumic requiring pressors  UTI  MARGARET with normal baseline with hypernatremia.  New Afib  Abnormal CT chest abd pelvis      - Hiatal hernia     - Nonobstructive right renal stone versus vascular calcification.     - Acute or subacute L1 fracture.     - Left femoral subcapital fracture with overlying soft tissue contusion, likely acute.     - Markedly dilated rectum with perirectal stranding, concerning for stercoral colitis.     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    Underlying HTN, DM2, High chol, Hypothyroidism           Plan  IVF will increase D5  trend labs Q 8  Monitor neuro status  F/u cultures  ID f/u  Check Vanco trough and dose  neuro checks    NGT placement - may be difficult as has hiatal hernia and on a/c  TSH as above - need to repeat when more stable    HR controlled today remains on afib on monitor     on heparin drip    check EKG     Ortho eval -  Left minimally displaced femoral neck fracture with callus formation consistent with chronic vs subacute injury, Patient is non ambulatory and not an operative candidate, No urgent orthopedic intervention  aspiration precautions  S&S eval          PMD:		Dr gillette 		                   Notified(Date): 7/23/24  Family Updated: 	Norris [phone]	                                 Date: 7/24     requests to hold off NGT at this time and keep npo         Sedation & Analgesia:	  Diet/Nutrition:		 Diet, NPO (07-22-24 @ 23:06) [Active]  GI PPx:			pantoprazole  Injectable 40 milliGRAM(s) IV Push daily  DVT Ppx:		heparin   Injectable 5000 Unit(s) SubCutaneous every 12 hours    	RISK                                                          Points  	[ ] Previous VTE                                           	3  	[ ] Thrombophilia                                        	2  	[ ] Lower limb paralysis                              	2   	[ ] Current Cancer                                       	2   	[x ] Immobilization > 24 hrs                        	1  	[ ] ICU/CCU stay > 24 hours                       	1  	[x ] Age > 60                                                   	1  		Total:[2 ]      Activity:		  Advance as tolerated  Head of Bed:               35-45 Deg  Glycemic Control:        n/a      Lines:  PIV  CENTRAL LINE: 	[ ] YES [ ] NO	                    LOCATION:   	                       DATE INSERTED:   	                    REMOVE:  [ ] YES [ ] NO    A-LINE:  	                [ ] YES [ ] NO                      LOCATION:   	                       DATE INSERTED: 		            REMOVE:  [ ] YES [ ] NO    HUERTA: 		        [x ] YES [ ] NO  		                                       DATE INSERTED:		            REMOVE:  [ ] YES [x] NO    // plan to d/c in am if improving renal function    Restraints were deemed necessary to prevent removal of life-sustaining devices [  ] YES   [   x ]  NO    Disposition: ICU Care     Goals of Care: dnr/i, palliative following

## 2024-07-24 NOTE — PROGRESS NOTE ADULT - SUBJECTIVE AND OBJECTIVE BOX
Progress: pt more awake, alert today , speaking few words     Present Symptoms:   Dyspnea: no  Nausea/Vomiting:   Anxiety:    Depressed Mood:   Fatigue: yes  Loss of appetite:   Pain:     no              location:   Review of Systems: [ Unable to obtain due to poor mentation]    MEDICATIONS  (STANDING):  atorvastatin 80 milliGRAM(s) Oral at bedtime  clopidogrel Tablet 75 milliGRAM(s) Oral daily  dextrose 5%. 1000 milliLiter(s) (100 mL/Hr) IV Continuous <Continuous>  levothyroxine Injectable 84 MICROGram(s) IV Push at bedtime  pantoprazole  Injectable 40 milliGRAM(s) IV Push daily  phenylephrine    Infusion 0.5 MICROgram(s)/kG/Min (10.2 mL/Hr) IV Continuous <Continuous>  piperacillin/tazobactam IVPB.. 3.375 Gram(s) IV Intermittent every 12 hours    MEDICATIONS  (PRN):      PHYSICAL EXAM:  Vital Signs Last 24 Hrs  T(C): 36.5 (24 Jul 2024 11:00), Max: 38.3 (23 Jul 2024 21:30)  T(F): 97.7 (24 Jul 2024 11:00), Max: 100.9 (23 Jul 2024 21:30)  HR: 83 (24 Jul 2024 16:30) (70 - 110)  BP: 133/57 (24 Jul 2024 16:15) (90/55 - 137/113)  BP(mean): 78 (24 Jul 2024 16:15) (51 - 121)  RR: 21 (24 Jul 2024 16:30) (13 - 34)  SpO2: 100% (24 Jul 2024 16:15) (83% - 100%)    Parameters below as of 24 Jul 2024 05:00  Patient On (Oxygen Delivery Method): nasal cannula  O2 Flow (L/min): 2    General: alert  oriented x 2/3      HEENT: n/c, a/t edentulous     Lungs: dim to bases, non labored    CV: normal  rate  GI: normal  soft, n/t   : normal /zapata   Musculoskeletal: weakened, contracted   Skin: pale, w/d     Neuro: more awake, alert, today , speaking few words, oriented to self, location    Oral intake ability:  oral feeding - TBD   Diet: NPO, TBD     LABS:                          8.6    10.70 )-----------( 181      ( 24 Jul 2024 12:37 )             28.6     07-24    144  |  111<H>  |  84<H>  ----------------------------<  150<H>  3.8   |  23  |  3.71<H>    Ca    8.3<L>      24 Jul 2024 12:00  Phos  3.7     07-24  Mg     2.0     07-24    TPro  6.2  /  Alb  2.5<L>  /  TBili  0.7  /  DBili  x   /  AST  50<H>  /  ALT  16  /  AlkPhos  139<H>  07-23    Urinalysis Basic - ( 24 Jul 2024 12:00 )    Color: x / Appearance: x / SG: x / pH: x  Gluc: 150 mg/dL / Ketone: x  / Bili: x / Urobili: x   Blood: x / Protein: x / Nitrite: x   Leuk Esterase: x / RBC: x / WBC x   Sq Epi: x / Non Sq Epi: x / Bacteria: x        RADIOLOGY & ADDITIONAL STUDIES: < from: Xray Hip 1 View, Left (07.24.24 @ 09:16) >  ACC: [id number] EXAM:  XR HIP 1V LT   ORDERED BY:  STEFANIE ABEL     PROCEDURE DATE:  07/24/2024          INTERPRETATION:  Clinical history: 89-year-old female, rule out fracture.    Single view of the left hip is correlated with the abdominal CT of  7/22/2024.    FINDINGS: Impaction at the neck of the left femur consistent with CT,   unchanged.    Pelvic deformity, better characterized on CT.    IMPRESSION:    Impacted left subcapital fracture, better characterized on CT,   unchanged.. Patientadmitted            < from: CT Abdomen and Pelvis No Cont (07.22.24 @ 22:35) >  IMPRESSION:  Non obstructing right renal stone versus vascular calcification.  Acute or subacute L1 fracture.  Left femoral subcapital fracture with overlying soft tissue contusion,   likely acute.  Markedly dilated rectum with perirectal stranding, concerning for   stercoral colitis.  No change of known large hiatal hernia with intrathoracic stomach which   contains an intramural lipoma.          ADVANCE DIRECTIVES:  Molst dnr/dni  hcp   Advanced Care Planning discussion total time spent:

## 2024-07-24 NOTE — PROGRESS NOTE ADULT - ASSESSMENT
89 yo female admitted 7/22 with failure to thrive.  She has a past history of DM,CAD, and CABG along with sternal non union.  She has a left femoral subcapital fracture, no surgery is planned.  Low grade fever may reflect hip fracture if acute.  She has received both vanco and zosyn since admission.  Case reveiwed with Dr Langford, son thinks that vanco has helped her in the past.  Ct with stercoral colitis, left femoral fracture, and acute or subacute L1 fracture.  Her admission blood cultures have been negative.  Fever may be secondary to left subcapital fracture  Suggest:  1 Limit antibiotics to short 3-5 day course(day 3)  2 Non operative management as per ortho  3 Await urine culture and additional incubations of cultures  4 Supportive care with goals of care discussions in progress,

## 2024-07-24 NOTE — SWALLOW BEDSIDE ASSESSMENT ADULT - ORAL PREPARATORY PHASE
Refuses to accept bolus into oral cavity/Reduced oral grading/Anterior loss of bolus/Bolus falls into anterior sulcus

## 2024-07-25 LAB
ANION GAP SERPL CALC-SCNC: 10 MMOL/L — SIGNIFICANT CHANGE UP (ref 5–17)
ANION GAP SERPL CALC-SCNC: 10 MMOL/L — SIGNIFICANT CHANGE UP (ref 5–17)
APTT BLD: 125.8 SEC — CRITICAL HIGH (ref 24.5–35.6)
APTT BLD: 28.1 SEC — SIGNIFICANT CHANGE UP (ref 24.5–35.6)
BUN SERPL-MCNC: 62 MG/DL — HIGH (ref 7–23)
BUN SERPL-MCNC: 70 MG/DL — HIGH (ref 7–23)
CALCIUM SERPL-MCNC: 8.5 MG/DL — SIGNIFICANT CHANGE UP (ref 8.4–10.5)
CALCIUM SERPL-MCNC: 8.6 MG/DL — SIGNIFICANT CHANGE UP (ref 8.4–10.5)
CHLORIDE SERPL-SCNC: 107 MMOL/L — SIGNIFICANT CHANGE UP (ref 96–108)
CHLORIDE SERPL-SCNC: 107 MMOL/L — SIGNIFICANT CHANGE UP (ref 96–108)
CO2 SERPL-SCNC: 24 MMOL/L — SIGNIFICANT CHANGE UP (ref 22–31)
CO2 SERPL-SCNC: 24 MMOL/L — SIGNIFICANT CHANGE UP (ref 22–31)
CREAT SERPL-MCNC: 2.72 MG/DL — HIGH (ref 0.5–1.3)
CREAT SERPL-MCNC: 3.01 MG/DL — HIGH (ref 0.5–1.3)
EGFR: 14 ML/MIN/1.73M2 — LOW
EGFR: 16 ML/MIN/1.73M2 — LOW
GLUCOSE BLDC GLUCOMTR-MCNC: 185 MG/DL — HIGH (ref 70–99)
GLUCOSE SERPL-MCNC: 142 MG/DL — HIGH (ref 70–99)
GLUCOSE SERPL-MCNC: 146 MG/DL — HIGH (ref 70–99)
HCT VFR BLD CALC: 23 % — LOW (ref 34.5–45)
HCT VFR BLD CALC: 23.3 % — LOW (ref 34.5–45)
HCT VFR BLD CALC: 26.5 % — LOW (ref 34.5–45)
HGB BLD-MCNC: 7.1 G/DL — LOW (ref 11.5–15.5)
HGB BLD-MCNC: 7.5 G/DL — LOW (ref 11.5–15.5)
HGB BLD-MCNC: 8.3 G/DL — LOW (ref 11.5–15.5)
INR BLD: 1.22 RATIO — HIGH (ref 0.85–1.18)
MAGNESIUM SERPL-MCNC: 1.8 MG/DL — SIGNIFICANT CHANGE UP (ref 1.6–2.6)
MAGNESIUM SERPL-MCNC: 2.2 MG/DL — SIGNIFICANT CHANGE UP (ref 1.6–2.6)
MCHC RBC-ENTMCNC: 28 PG — SIGNIFICANT CHANGE UP (ref 27–34)
MCHC RBC-ENTMCNC: 28.5 PG — SIGNIFICANT CHANGE UP (ref 27–34)
MCHC RBC-ENTMCNC: 28.7 PG — SIGNIFICANT CHANGE UP (ref 27–34)
MCHC RBC-ENTMCNC: 30.9 GM/DL — LOW (ref 32–36)
MCHC RBC-ENTMCNC: 31.3 GM/DL — LOW (ref 32–36)
MCHC RBC-ENTMCNC: 32.2 GM/DL — SIGNIFICANT CHANGE UP (ref 32–36)
MCV RBC AUTO: 89.3 FL — SIGNIFICANT CHANGE UP (ref 80–100)
MCV RBC AUTO: 90.6 FL — SIGNIFICANT CHANGE UP (ref 80–100)
MCV RBC AUTO: 91.1 FL — SIGNIFICANT CHANGE UP (ref 80–100)
NRBC # BLD: 0 /100 WBCS — SIGNIFICANT CHANGE UP (ref 0–0)
PHOSPHATE SERPL-MCNC: 3 MG/DL — SIGNIFICANT CHANGE UP (ref 2.5–4.5)
PHOSPHATE SERPL-MCNC: 3.4 MG/DL — SIGNIFICANT CHANGE UP (ref 2.5–4.5)
PLATELET # BLD AUTO: 132 K/UL — LOW (ref 150–400)
PLATELET # BLD AUTO: 144 K/UL — LOW (ref 150–400)
PLATELET # BLD AUTO: 179 K/UL — SIGNIFICANT CHANGE UP (ref 150–400)
POTASSIUM SERPL-MCNC: 3.7 MMOL/L — SIGNIFICANT CHANGE UP (ref 3.5–5.3)
POTASSIUM SERPL-MCNC: 3.7 MMOL/L — SIGNIFICANT CHANGE UP (ref 3.5–5.3)
POTASSIUM SERPL-SCNC: 3.7 MMOL/L — SIGNIFICANT CHANGE UP (ref 3.5–5.3)
POTASSIUM SERPL-SCNC: 3.7 MMOL/L — SIGNIFICANT CHANGE UP (ref 3.5–5.3)
PROTHROM AB SERPL-ACNC: 13.8 SEC — HIGH (ref 9.5–13)
RBC # BLD: 2.54 M/UL — LOW (ref 3.8–5.2)
RBC # BLD: 2.61 M/UL — LOW (ref 3.8–5.2)
RBC # BLD: 2.91 M/UL — LOW (ref 3.8–5.2)
RBC # FLD: 15.2 % — HIGH (ref 10.3–14.5)
RBC # FLD: 15.5 % — HIGH (ref 10.3–14.5)
RBC # FLD: 15.6 % — HIGH (ref 10.3–14.5)
SODIUM SERPL-SCNC: 141 MMOL/L — SIGNIFICANT CHANGE UP (ref 135–145)
SODIUM SERPL-SCNC: 141 MMOL/L — SIGNIFICANT CHANGE UP (ref 135–145)
WBC # BLD: 7.6 K/UL — SIGNIFICANT CHANGE UP (ref 3.8–10.5)
WBC # BLD: 7.89 K/UL — SIGNIFICANT CHANGE UP (ref 3.8–10.5)
WBC # BLD: 9.49 K/UL — SIGNIFICANT CHANGE UP (ref 3.8–10.5)
WBC # FLD AUTO: 7.6 K/UL — SIGNIFICANT CHANGE UP (ref 3.8–10.5)
WBC # FLD AUTO: 7.89 K/UL — SIGNIFICANT CHANGE UP (ref 3.8–10.5)
WBC # FLD AUTO: 9.49 K/UL — SIGNIFICANT CHANGE UP (ref 3.8–10.5)

## 2024-07-25 RX ORDER — HEPARIN SODIUM 1000 [USP'U]/ML
INJECTION, SOLUTION INTRAVENOUS; SUBCUTANEOUS
Qty: 25000 | Refills: 0 | Status: DISCONTINUED | OUTPATIENT
Start: 2024-07-25 | End: 2024-07-25

## 2024-07-25 RX ORDER — MAGNESIUM SULFATE 500 MG/ML
2 VIAL (ML) INJECTION ONCE
Refills: 0 | Status: COMPLETED | OUTPATIENT
Start: 2024-07-25 | End: 2024-07-25

## 2024-07-25 RX ORDER — MUPIROCIN CALCIUM 20 MG/G
1 CREAM TOPICAL
Refills: 0 | Status: COMPLETED | OUTPATIENT
Start: 2024-07-25 | End: 2024-07-30

## 2024-07-25 RX ORDER — DEXTROSE MONOHYDRATE, SODIUM CHLORIDE, SODIUM LACTATE, CALCIUM CHLORIDE, MAGNESIUM CHLORIDE 1.5; 538; 448; 18.4; 5.08 G/100ML; MG/100ML; MG/100ML; MG/100ML; MG/100ML
1000 SOLUTION INTRAPERITONEAL
Refills: 0 | Status: DISCONTINUED | OUTPATIENT
Start: 2024-07-25 | End: 2024-07-26

## 2024-07-25 RX ORDER — POTASSIUM CHLORIDE 1500 MG/1
10 TABLET, EXTENDED RELEASE ORAL
Refills: 0 | Status: COMPLETED | OUTPATIENT
Start: 2024-07-25 | End: 2024-07-25

## 2024-07-25 RX ORDER — DEXTROSE MONOHYDRATE, SODIUM CHLORIDE, SODIUM LACTATE, CALCIUM CHLORIDE, MAGNESIUM CHLORIDE 1.5; 538; 448; 18.4; 5.08 G/100ML; MG/100ML; MG/100ML; MG/100ML; MG/100ML
500 SOLUTION INTRAPERITONEAL ONCE
Refills: 0 | Status: COMPLETED | OUTPATIENT
Start: 2024-07-25 | End: 2024-07-25

## 2024-07-25 RX ORDER — ALBUMIN HUMAN 25 %
100 INTRAVENOUS SOLUTION INTRAVENOUS EVERY 6 HOURS
Refills: 0 | Status: COMPLETED | OUTPATIENT
Start: 2024-07-25 | End: 2024-07-25

## 2024-07-25 RX ADMIN — PIPERACILLIN SODIUM, TAZOBACTAM SODIUM 25 GRAM(S): 3; .375 INJECTION, POWDER, LYOPHILIZED, FOR SOLUTION INTRAVENOUS at 05:02

## 2024-07-25 RX ADMIN — Medication 50 MILLILITER(S): at 11:26

## 2024-07-25 RX ADMIN — Medication 84 MICROGRAM(S): at 21:47

## 2024-07-25 RX ADMIN — Medication 25 GRAM(S): at 09:28

## 2024-07-25 RX ADMIN — HEPARIN SODIUM 1000 UNIT(S)/HR: 1000 INJECTION, SOLUTION INTRAVENOUS; SUBCUTANEOUS at 10:07

## 2024-07-25 RX ADMIN — POTASSIUM CHLORIDE 100 MILLIEQUIVALENT(S): 1500 TABLET, EXTENDED RELEASE ORAL at 11:05

## 2024-07-25 RX ADMIN — DEXTROSE MONOHYDRATE, SODIUM CHLORIDE, SODIUM LACTATE, CALCIUM CHLORIDE, MAGNESIUM CHLORIDE 500 MILLILITER(S): 1.5; 538; 448; 18.4; 5.08 SOLUTION INTRAPERITONEAL at 08:46

## 2024-07-25 RX ADMIN — DEXTROSE MONOHYDRATE, SODIUM CHLORIDE, SODIUM LACTATE, CALCIUM CHLORIDE, MAGNESIUM CHLORIDE 100 MILLILITER(S): 1.5; 538; 448; 18.4; 5.08 SOLUTION INTRAPERITONEAL at 03:00

## 2024-07-25 RX ADMIN — POTASSIUM CHLORIDE 100 MILLIEQUIVALENT(S): 1500 TABLET, EXTENDED RELEASE ORAL at 12:05

## 2024-07-25 RX ADMIN — PANTOPRAZOLE SODIUM 40 MILLIGRAM(S): 20 TABLET, DELAYED RELEASE ORAL at 11:25

## 2024-07-25 RX ADMIN — Medication 50 MILLILITER(S): at 17:30

## 2024-07-25 RX ADMIN — DEXTROSE MONOHYDRATE, SODIUM CHLORIDE, SODIUM LACTATE, CALCIUM CHLORIDE, MAGNESIUM CHLORIDE 100 MILLILITER(S): 1.5; 538; 448; 18.4; 5.08 SOLUTION INTRAPERITONEAL at 08:50

## 2024-07-25 RX ADMIN — MUPIROCIN CALCIUM 1 APPLICATION(S): 20 CREAM TOPICAL at 17:31

## 2024-07-25 RX ADMIN — PIPERACILLIN SODIUM, TAZOBACTAM SODIUM 25 GRAM(S): 3; .375 INJECTION, POWDER, LYOPHILIZED, FOR SOLUTION INTRAVENOUS at 17:31

## 2024-07-25 RX ADMIN — DEXTROSE MONOHYDRATE, SODIUM CHLORIDE, SODIUM LACTATE, CALCIUM CHLORIDE, MAGNESIUM CHLORIDE 100 MILLILITER(S): 1.5; 538; 448; 18.4; 5.08 SOLUTION INTRAPERITONEAL at 17:59

## 2024-07-25 NOTE — PROGRESS NOTE ADULT - ASSESSMENT
Physical Examination:  GENERAL:               Alert, +verbal No acute distress.    HEENT:                   No JVD, Dry MM  PULM:                     Bilateral air entry, Clear to auscultation bilaterally, no significant sputum production, +Rales, No Rhonchi, No Wheezing  CVS:                         S1, S2,  +Murmur  ABD:                        Soft,  distended, nontender, normoactive bowel sounds,   EXT:                         No edema, nontender, No Cyanosis or Clubbing    NEURO:                   alert confused,  verbal does follow commands   PSYC:                      Calm, No Insight.        Assessment  Metabolic Encephelopathy - Improving  Shock combination of both septic and hypovolumic requiring pressors  UTI  MARGARET with normal baseline with hypernatremia. - improving  New Afib  Abnormal CT chest abd pelvis      - Hiatal hernia     - Nonobstructive right renal stone versus vascular calcification.     - Acute or subacute L1 fracture.     - Left femoral subcapital fracture with overlying soft tissue contusion, likely acute.     - Markedly dilated rectum with perirectal stranding, concerning for stercoral colitis.     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    Underlying HTN, DM2, High chol, Hypothyroidism           Plan  tapered off pressors this am, did require bolus after wards as bp was low    will try o monitor off pressors  change d5w to D5 LR  trend labs Q 12    restart a/c with heparin, if h/h continue to drop   currently rate controlled  Monitor neuro status - currently improving  F/u cultures - noted UA contamination with 3 different species,   ID f/u  as MRSA swab neg, will hold off vanco for now    neuro checks    Family refused NGT placement  TSH as above - need to repeat when more stable  HR controlled today remains on afib on monitor     Restart heparin drip     Ortho eval -  Left minimally displaced femoral neck fracture with callus formation consistent with chronic vs subacute injury, Patient is non ambulatory and not an operative candidate, No urgent orthopedic intervention  aspiration precautions    S&S eval to advance diet and meds            PMD:		Dr gillette 		                   Notified(Date): 7/23/24  Family Updated: 	Norris [phone]	                                 Date: 7/25     Updated    Sedation & Analgesia:	  Diet/Nutrition:		 Diet, NPO (07-22-24 @ 23:06) [Active]  GI PPx:			pantoprazole  Injectable 40 milliGRAM(s) IV Push daily  DVT Ppx:		heparin       Activity:		  Advance as tolerated  Head of Bed:               35-45 Deg  Glycemic Control:        n/a      Lines:  PIV  CENTRAL LINE: 	[ ] YES [ ] NO	                    LOCATION:   	                       DATE INSERTED:   	                    REMOVE:  [ ] YES [ ] NO    A-LINE:  	                [ ] YES [ ] NO                      LOCATION:   	                       DATE INSERTED: 		            REMOVE:  [ ] YES [ ] NO    HUERTA: 		        [ ] YES [x ] NO  		                                       DATE INSERTED:		            REMOVE:  [ ] YES [ ] NO        Restraints were deemed necessary to prevent removal of life-sustaining devices [  ] YES   [   x ]  NO    Disposition: ICU Care     Goals of Care: dnr/i, palliative following
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
## 2024-07-25 NOTE — PROGRESS NOTE ADULT - SUBJECTIVE AND OBJECTIVE BOX
CC: f/u for  uti, fever, shock  Patient reports nothing intelligible    REVIEW OF SYSTEMS:cannot get  All other review of systems negative (Comprehensive ROS)    Antimicrobials Day #  :4/5  piperacillin/tazobactam IVPB.. 3.375 Gram(s) IV Intermittent every 12 hours    Other Medications Reviewed    T(F): 97 (07-25-24 @ 18:00), Max: 98.1 (07-25-24 @ 05:00)  HR: 87 (07-25-24 @ 18:00)  BP: 108/73 (07-25-24 @ 18:00)  RR: 23 (07-25-24 @ 18:00)  SpO2: 96% (07-25-24 @ 18:00)  Wt(kg): --    PHYSICAL EXAM:  General: no acute distress  Eyes:  anicteric, no conjunctival injection, no discharge  Oropharynx: no lesions or injection 	  Neck: supple, without adenopathy  Lungs: poor effort  to auscultation  Heart:iregular rate and rhythm; no murmur, rubs or gallops  Abdomen: soft, nondistended, nontender, without mass or organomegaly  Skin: no lesions  Extremities: no clubbing, cyanosis, or edema  Neurologic: not interactive    LAB RESULTS:                        7.5    7.60  )-----------( 144      ( 25 Jul 2024 17:06 )             23.3     07-25    141  |  107  |  62<H>  ----------------------------<  146<H>  3.7   |  24  |  2.72<H>    Ca    8.6      25 Jul 2024 13:09  Phos  3.0     07-25  Mg     2.2     07-25        Urinalysis Basic - ( 25 Jul 2024 13:09 )    Color: x / Appearance: x / SG: x / pH: x  Gluc: 146 mg/dL / Ketone: x  / Bili: x / Urobili: x   Blood: x / Protein: x / Nitrite: x   Leuk Esterase: x / RBC: x / WBC x   Sq Epi: x / Non Sq Epi: x / Bacteria: x      MICROBIOLOGY:  RECENT CULTURES:  07-23 @ 08:00 Clean Catch None     >=3 organisms. Probable collection contamination.      07-22 @ 20:55 .Blood Blood-Peripheral     No growth at 48 Hours      07-22 @ 20:50 .Blood Blood-Peripheral     No growth at 48 Hours          RADIOLOGY REVIEWED:      < from: Xray Hip 1 View, Left (07.24.24 @ 09:16) >    ACC: [id number] EXAM:  XR HIP 1V LT   ORDERED BY:  STEFANIE ABEL     PROCEDURE DATE:  07/24/2024          INTERPRETATION:  Clinical history: 89-year-old female, rule out fracture.    Single view of the left hip is correlated with the abdominal CT of  7/22/2024.    FINDINGS: Impaction at the neck of the left femur consistent with CT,   unchanged.    Pelvic deformity, better characterized on CT.    IMPRESSION:    Impacted left subcapital fracture, better characterized on CT,   unchanged.. Patientadmitted    --- End of Report ---    < end of copied text >          Assessment:  Elderly woman from [place] with a  week long history of general decline, lethargy, poor po intake, hypoxia. She was found to be hypotensive and  needed pressors and some temps. Imaging shows a left hip fx but no surgery planned and possible stercoral colitis. She had some low grade temps and leukocytosis with hypoxia on 2L and ua with pyuria and nato. She could have stercoral colitis, could have pyelonephritis too . Comfort as goc would be not unreasonable. she has afib on heparin drip. She is with greater than 3 organisms in urine, bc are no growth. . She is now off pressors. she appears to be responding to the zosyn for either uti and or stercoral colitis.   Plan:  would favor to limit zosyn to 1 more day  continue bowel regimen  afib per team

## 2024-07-26 LAB
ANION GAP SERPL CALC-SCNC: 11 MMOL/L — SIGNIFICANT CHANGE UP (ref 5–17)
BLD GP AB SCN SERPL QL: SIGNIFICANT CHANGE UP
BUN SERPL-MCNC: 51 MG/DL — HIGH (ref 7–23)
CALCIUM SERPL-MCNC: 8.9 MG/DL — SIGNIFICANT CHANGE UP (ref 8.4–10.5)
CHLORIDE SERPL-SCNC: 111 MMOL/L — HIGH (ref 96–108)
CO2 SERPL-SCNC: 24 MMOL/L — SIGNIFICANT CHANGE UP (ref 22–31)
CREAT SERPL-MCNC: 2.32 MG/DL — HIGH (ref 0.5–1.3)
EGFR: 19 ML/MIN/1.73M2 — LOW
GLUCOSE BLDC GLUCOMTR-MCNC: 161 MG/DL — HIGH (ref 70–99)
GLUCOSE SERPL-MCNC: 108 MG/DL — HIGH (ref 70–99)
HCT VFR BLD CALC: 21.7 % — LOW (ref 34.5–45)
HCT VFR BLD CALC: 24.2 % — LOW (ref 34.5–45)
HGB BLD-MCNC: 7.1 G/DL — LOW (ref 11.5–15.5)
HGB BLD-MCNC: 7.5 G/DL — LOW (ref 11.5–15.5)
MAGNESIUM SERPL-MCNC: 2.2 MG/DL — SIGNIFICANT CHANGE UP (ref 1.6–2.6)
MCHC RBC-ENTMCNC: 28.2 PG — SIGNIFICANT CHANGE UP (ref 27–34)
MCHC RBC-ENTMCNC: 29 PG — SIGNIFICANT CHANGE UP (ref 27–34)
MCHC RBC-ENTMCNC: 31 GM/DL — LOW (ref 32–36)
MCHC RBC-ENTMCNC: 32.7 GM/DL — SIGNIFICANT CHANGE UP (ref 32–36)
MCV RBC AUTO: 88.6 FL — SIGNIFICANT CHANGE UP (ref 80–100)
MCV RBC AUTO: 91 FL — SIGNIFICANT CHANGE UP (ref 80–100)
NRBC # BLD: 0 /100 WBCS — SIGNIFICANT CHANGE UP (ref 0–0)
NRBC # BLD: 0 /100 WBCS — SIGNIFICANT CHANGE UP (ref 0–0)
PHOSPHATE SERPL-MCNC: 2.6 MG/DL — SIGNIFICANT CHANGE UP (ref 2.5–4.5)
PLATELET # BLD AUTO: 142 K/UL — LOW (ref 150–400)
PLATELET # BLD AUTO: 143 K/UL — LOW (ref 150–400)
POTASSIUM SERPL-MCNC: 3.3 MMOL/L — LOW (ref 3.5–5.3)
POTASSIUM SERPL-SCNC: 3.3 MMOL/L — LOW (ref 3.5–5.3)
RBC # BLD: 2.45 M/UL — LOW (ref 3.8–5.2)
RBC # BLD: 2.66 M/UL — LOW (ref 3.8–5.2)
RBC # FLD: 15.2 % — HIGH (ref 10.3–14.5)
RBC # FLD: 15.6 % — HIGH (ref 10.3–14.5)
SODIUM SERPL-SCNC: 146 MMOL/L — HIGH (ref 135–145)
WBC # BLD: 6.52 K/UL — SIGNIFICANT CHANGE UP (ref 3.8–10.5)
WBC # BLD: 6.62 K/UL — SIGNIFICANT CHANGE UP (ref 3.8–10.5)
WBC # FLD AUTO: 6.52 K/UL — SIGNIFICANT CHANGE UP (ref 3.8–10.5)
WBC # FLD AUTO: 6.62 K/UL — SIGNIFICANT CHANGE UP (ref 3.8–10.5)

## 2024-07-26 PROCEDURE — 99222 1ST HOSP IP/OBS MODERATE 55: CPT

## 2024-07-26 PROCEDURE — 99232 SBSQ HOSP IP/OBS MODERATE 35: CPT

## 2024-07-26 PROCEDURE — 99497 ADVNCD CARE PLAN 30 MIN: CPT

## 2024-07-26 RX ORDER — DEXTROSE MONOHYDRATE, SODIUM CHLORIDE, SODIUM LACTATE, CALCIUM CHLORIDE, MAGNESIUM CHLORIDE 1.5; 538; 448; 18.4; 5.08 G/100ML; MG/100ML; MG/100ML; MG/100ML; MG/100ML
1000 SOLUTION INTRAPERITONEAL
Refills: 0 | Status: DISCONTINUED | OUTPATIENT
Start: 2024-07-26 | End: 2024-07-27

## 2024-07-26 RX ORDER — POTASSIUM CHLORIDE 1500 MG/1
10 TABLET, EXTENDED RELEASE ORAL
Refills: 0 | Status: COMPLETED | OUTPATIENT
Start: 2024-07-26 | End: 2024-07-26

## 2024-07-26 RX ADMIN — POTASSIUM CHLORIDE 100 MILLIEQUIVALENT(S): 1500 TABLET, EXTENDED RELEASE ORAL at 11:52

## 2024-07-26 RX ADMIN — MUPIROCIN CALCIUM 1 APPLICATION(S): 20 CREAM TOPICAL at 05:23

## 2024-07-26 RX ADMIN — POTASSIUM CHLORIDE 100 MILLIEQUIVALENT(S): 1500 TABLET, EXTENDED RELEASE ORAL at 12:00

## 2024-07-26 RX ADMIN — DEXTROSE MONOHYDRATE, SODIUM CHLORIDE, SODIUM LACTATE, CALCIUM CHLORIDE, MAGNESIUM CHLORIDE 75 MILLILITER(S): 1.5; 538; 448; 18.4; 5.08 SOLUTION INTRAPERITONEAL at 17:59

## 2024-07-26 RX ADMIN — MUPIROCIN CALCIUM 1 APPLICATION(S): 20 CREAM TOPICAL at 18:00

## 2024-07-26 RX ADMIN — DEXTROSE MONOHYDRATE, SODIUM CHLORIDE, SODIUM LACTATE, CALCIUM CHLORIDE, MAGNESIUM CHLORIDE 75 MILLILITER(S): 1.5; 538; 448; 18.4; 5.08 SOLUTION INTRAPERITONEAL at 22:42

## 2024-07-26 RX ADMIN — Medication 84 MICROGRAM(S): at 22:41

## 2024-07-26 RX ADMIN — DEXTROSE MONOHYDRATE, SODIUM CHLORIDE, SODIUM LACTATE, CALCIUM CHLORIDE, MAGNESIUM CHLORIDE 75 MILLILITER(S): 1.5; 538; 448; 18.4; 5.08 SOLUTION INTRAPERITONEAL at 11:53

## 2024-07-26 RX ADMIN — PIPERACILLIN SODIUM, TAZOBACTAM SODIUM 25 GRAM(S): 3; .375 INJECTION, POWDER, LYOPHILIZED, FOR SOLUTION INTRAVENOUS at 05:22

## 2024-07-26 RX ADMIN — PIPERACILLIN SODIUM, TAZOBACTAM SODIUM 25 GRAM(S): 3; .375 INJECTION, POWDER, LYOPHILIZED, FOR SOLUTION INTRAVENOUS at 17:57

## 2024-07-26 RX ADMIN — PANTOPRAZOLE SODIUM 40 MILLIGRAM(S): 20 TABLET, DELAYED RELEASE ORAL at 11:52

## 2024-07-26 NOTE — PROGRESS NOTE ADULT - ASSESSMENT
A/P 89y old  Female who presents with a chief complaint of AMS, with h/o CAD s/p CABG, DM2, HTN, High chol, who was sent from [facility] with AMS, and decreased PO intake x 1 week.     still weak, lethargic , downtrending H/H        Assessment  Metabolic Encephelopathy  Shock- s/p   UTI  MARGARET with normal baseline with hypernatremia.  New Afib  Abnormal CT chest abd pelvis      - Hiatal hernia     - Nonobstructive right renal stone versus vascular calcification.     - Acute or subacute L1 fracture     - Left femoral subcapital fracture with overlying soft tissue contusion, likely acute.    Underlying HTN, DM2, High chol, Hypothyroidism       Plans;   Ortho eval done/noted -  Left minimally displaced femoral neck fracture with callus formation consistent with chronic vs subacute injury, Patient is non ambulatory and not an operative candidate, No urgent orthopedic intervention  aspiration precautions  S&S  RE- eval- unable pt too lethargic, remains NPO   Cardiology consulted   afib rate controlled  not toerating a/c - hold a/c  ID f/u - recs noted  stop zosyn day 5       Palliative :   as a f/u today , case d/w ccu team today Dr angulo , and I reviewed chart , consults noted, tests noted. I saw pt bedside, she was very lethargic, woke w/ tactile stim., but kept eyes closed for me, said few words weak voice.   But was not in any distress. Pt still unable  to philomena PO diet and no alt means of nutrition  to be used. called and spoke w/ son Emir today , who is [place] along w/ caretaker Norris . See [facility] note above.  AT  this  time, family wishes to focus on comfort care , and wish to take pt back home,  I recommended hospice services to support them at home. Family in agreement, consult placed today . CCU team aware of plan ,sw made aware.   Await approval from hospice
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
## 2024-07-26 NOTE — PROGRESS NOTE ADULT - CONVERSATION DETAILS
Spoke w/ pts caretaker, friend Norris Henry today , he was updated by ccu team today , and aware of pt condition ,  we discussed pt still remains mostly lethargic, and still unable to safely eat by mouth . Discussed alt means nutrition/feeding tubes and Norris confirmed pt does not want any feeding tubes used. I spoke w/ him about next steps, including comfort focused care and hospice services. We also then called pt son Khris who [place] and reachable only at certain times, and he joined the call. Reviewed what Norris and I had discussed , and  Khris stated he understood, and he was in agreement w/ no feeding tubes, and comfort care including hospice eval for those services  . I explained that hospice may need to reach him , and Khris then  gave permission for hospice to speak to Norris about this as well,  if they could not reach him . Discussed I would be placing hospice consult today , their hope is to have pt at home on hospice if approved .

## 2024-07-26 NOTE — PROGRESS NOTE ADULT - MOLST UPDATED
26-Jul-2024 Niacinamide Pregnancy And Lactation Text: These medications are considered safe during pregnancy.
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
## 2024-07-26 NOTE — PROGRESS NOTE ADULT - ASSESSMENT
89 yo female admitted 7/22 with failure to thrive.  She has a past history of DM,CAD, and CABG along with sternal non union.  She has a left femoral subcapital fracture, no surgery is planned.  Low grade fever may reflect hip fracture if acute.  She has received both vanco and zosyn since admission.  Case reviewed  with Dr Langford, son thinks that vanco has helped her in the past.  Ct with stercoral colitis, left femoral fracture, and acute or subacute L1 fracture.  Her admission blood cultures have been negative.  Fever may be secondary to left subcapital fracture  She appears more chronically than acutely ill  Suggest:  1 Advise stopping zosyn- day 5  2 Non operative management as per ortho  3 Favor a more palliative approach  4 Antibiotics will offer limited long term benefit here

## 2024-07-26 NOTE — PROGRESS NOTE ADULT - ASSESSMENT
Physical Examination:  GENERAL:               Alert, +verbal No acute distress.    HEENT:                   No JVD, Dry MM  PULM:                     Bilateral air entry, Clear to auscultation bilaterally, no significant sputum production, +Rales, No Rhonchi, No Wheezing  CVS:                         S1, S2,  +Murmur  ABD:                        Soft,  distended, nontender, normoactive bowel sounds,   EXT:                         No edema, nontender, No Cyanosis or Clubbing    NEURO:                   alert confused,  verbal does follow commands   PSYC:                      Calm, No Insight.        Assessment  Metabolic Encephelopathy - Improving  Shock combination of both septic and hypovolumic requiring pressors  UTI  MARGARET with normal baseline with hypernatremia. - improving  New Afib  Abnormal CT chest abd pelvis      - Hiatal hernia     - Nonobstructive right renal stone versus vascular calcification.     - Acute or subacute L1 fracture.     - Left femoral subcapital fracture with overlying soft tissue contusion, likely acute.     - Markedly dilated rectum with perirectal stranding, concerning for stercoral colitis.     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    Underlying HTN, DM2, High chol, Hypothyroidism           Plan  afib rate controlled  not toerating a/c  will hold a/c  cardio eval called  change D5 LR to d5 1/2ns  seen by speech refusing diet  trend labs Q daily  currently rate controlled  Monitor neuro status - currently improving  F/u cultures - noted UA contamination with 3 different species,   ID f/u  as MRSA swab neg, will hold off vanco for now  neuro checks  Family refused NGT placement  TSH as above - need to repeat when more stable     Ortho eval -  Left minimally displaced femoral neck fracture with callus formation consistent with chronic vs subacute injury, Patient is non ambulatory and not an operative candidate, No urgent orthopedic intervention  aspiration precautions    S&S eval to advance diet and meds            PMD:		Dr gillette 		                   Notified(Date): 7/23/24  Family Updated: 	Norris [phone]	                                 Date: 7/26     Updated    Sedation & Analgesia:	  Diet/Nutrition:		 Diet, NPO (07-22-24 @ 23:06) [Active], family states no NGT  GI PPx:			pantoprazole  Injectable 40 milliGRAM(s) IV Push daily  DVT Ppx:		heparin       Activity:		  Advance as tolerated  Head of Bed:               35-45 Deg  Glycemic Control:        n/a      Lines:  PIV  CENTRAL LINE: 	[ ] YES [ ] NO	                    LOCATION:   	                       DATE INSERTED:   	                    REMOVE:  [ ] YES [ ] NO    A-LINE:  	                [ ] YES [ ] NO                      LOCATION:   	                       DATE INSERTED: 		            REMOVE:  [ ] YES [ ] NO    HUERTA: 		        [ ] YES [x ] NO  		                                       DATE INSERTED:		            REMOVE:  [ ] YES [ ] NO        Restraints were deemed necessary to prevent removal of life-sustaining devices [  ] YES   [   x ]  NO    Disposition: Transfer to medical floor      Goals of Care: dnr/i, palliative following

## 2024-07-26 NOTE — PROGRESS NOTE ADULT - ASSESSMENT
89 y/o woman from NH with h/o Afib, CAD s/p CABG, DM2, HTN, High chol, presents with week long history of general decline, lethargy, poor po intake, hypoxia. She was found to be hypotensive and  needed pressors and some temps. Imaging shows a left hip fx but no surgery planned and possible stercoral colitis.       Plan:    Neuro: Monitor mental status    Pulm: Monitor O2 sat goal of >92%    CV: Off pressors, on IVF, continue plavix for AF holding heparin drip in the setting of drop in H&H trend CBC     GI: NPO    ID: Continue zosyn ID consulted    Renal: monitor I&O and BMP    PPX:  PPI, scd's, repeat H&H remains low 7.1/21.7 continue to hold full AC

## 2024-07-26 NOTE — CONSULT NOTE ADULT - ASSESSMENT
89-year-old woman initially admitted with altered mental status, due to metabolic encephalopathy in the setting of septic and hypovolemic shock requiring pressors.  She developed atrial fibrillation on the second hospital day and has persisted since then.  Cardiology now consulted for new atrial fibrillation.  She has cardiac history of coronary artery disease, status post remote CABG.  Hypertension, carotid artery disease, hyperlipidemia.  Since hospital admission, she has been significantly anemic.  Echocardiogram with normal left ventricular systolic function, mild to moderate valvular heart disease.  Anticoagulation had been started but not able to be continued secondary to bleeding and anemia.    Recommendations  -AF is rate controlled in absence of negative chronotropic medications suggesting sick sinus syndrome.   -Reasonable to hold anticoagulation in the current setting.  Anticoagulation can be reconsidered post resolution of anemia and bleeding.  -Continue supportive care.  -Monitor labs and replete electrolytes as needed.  -Consider blood transfusion if within goals of care  -Overall guarded prognosis.  -Discussed with ICU team.

## 2024-07-26 NOTE — PROGRESS NOTE ADULT - SUBJECTIVE AND OBJECTIVE BOX
CC: f/u for failure to thrive and possible sepsis    Patient reports: she is mumbling incoherently in bed.    REVIEW OF SYSTEMS:  All other review of systems negative (Comprehensive ROS): limited by underlying condition    Antimicrobials Day #  :day 5/5  piperacillin/tazobactam IVPB.. 3.375 Gram(s) IV Intermittent every 12 hours    Other Medications Reviewed  MEDICATIONS  (STANDING):  dextrose 5% + sodium chloride 0.45%. 1000 milliLiter(s) (75 mL/Hr) IV Continuous <Continuous>  levothyroxine Injectable 84 MICROGram(s) IV Push at bedtime  mupirocin 2% Nasal 1 Application(s) Both Nostrils two times a day  pantoprazole  Injectable 40 milliGRAM(s) IV Push daily  piperacillin/tazobactam IVPB.. 3.375 Gram(s) IV Intermittent every 12 hours  potassium chloride  10 mEq/100 mL IVPB 10 milliEquivalent(s) IV Intermittent every 1 hour    T(F): 97.9 (07-26-24 @ 03:00), Max: 98.1 (07-25-24 @ 23:00)  HR: 86 (07-26-24 @ 07:00)  BP: 110/56 (07-26-24 @ 07:00)  RR: 18 (07-26-24 @ 07:00)  SpO2: 99% (07-26-24 @ 07:00)  Wt(kg): --    PHYSICAL EXAM:  General: poorly interactive and chronically ill appearing, on nasal oxygen  Eyes:  anicteric, no conjunctival injection, no discharge  Oropharynx: no lesions or injection 	  Neck: supple, without adenopathy  Lungs: scattered ronchi  Heart: regular rate and rhythm; no murmur, rubs or gallops  Abdomen: soft, nondistended, nontender, without mass or organomegaly  Skin: no lesions  Extremities: no clubbing, cyanosis, or edema  Neurologic: marginally interactive.    LAB RESULTS:                        7.5    6.62  )-----------( 143      ( 26 Jul 2024 05:00 )             24.2     07-26    146<H>  |  111<H>  |  51<H>  ----------------------------<  108<H>  3.3<L>   |  24  |  2.32<H>    Ca    8.9      26 Jul 2024 05:00  Phos  2.6     07-26  Mg     2.2     07-26              MICROBIOLOGY:  RECENT CULTURES:  07-23 @ 08:00 Clean Catch None     >=3 organisms. Probable collection contamination.      07-22 @ 20:55 .Blood Blood-Peripheral     No growth at 72 Hours      07-22 @ 20:50 .Blood Blood-Peripheral     No growth at 72 Hours          RADIOLOGY REVIEWED:    < from: Xray Hip 1 View, Left (07.24.24 @ 09:16) >  IMPRESSION:    Impacted left subcapital fracture, better characterized on CT,   unchanged.. Patientadmitted    --- End of Report ---    < end of copied text >  < from: Xray Chest 1 View- PORTABLE-Routine (Xray Chest 1 View- PORTABLE-Routine in AM.) (07.24.24 @ 05:59) >  IMPRESSION:    No radiographic evidence of acute chest disease.    < end of copied text >

## 2024-07-27 PROCEDURE — 99232 SBSQ HOSP IP/OBS MODERATE 35: CPT

## 2024-07-27 RX ORDER — LORAZEPAM 1 MG/1
0.5 TABLET ORAL EVERY 4 HOURS
Refills: 0 | Status: DISCONTINUED | OUTPATIENT
Start: 2024-07-27 | End: 2024-08-02

## 2024-07-27 RX ADMIN — MUPIROCIN CALCIUM 1 APPLICATION(S): 20 CREAM TOPICAL at 17:41

## 2024-07-27 RX ADMIN — DEXTROSE MONOHYDRATE, SODIUM CHLORIDE, SODIUM LACTATE, CALCIUM CHLORIDE, MAGNESIUM CHLORIDE 75 MILLILITER(S): 1.5; 538; 448; 18.4; 5.08 SOLUTION INTRAPERITONEAL at 05:10

## 2024-07-27 RX ADMIN — MUPIROCIN CALCIUM 1 APPLICATION(S): 20 CREAM TOPICAL at 05:10

## 2024-07-27 RX ADMIN — PIPERACILLIN SODIUM, TAZOBACTAM SODIUM 25 GRAM(S): 3; .375 INJECTION, POWDER, LYOPHILIZED, FOR SOLUTION INTRAVENOUS at 05:10

## 2024-07-27 RX ADMIN — Medication 2.5 MILLIGRAM(S): at 21:40

## 2024-07-27 NOTE — PROGRESS NOTE ADULT - SUBJECTIVE AND OBJECTIVE BOX
Patient seen and examined at bedside.    ALLERGIES:  No Known Allergies    MEDICATIONS  (STANDING):  mupirocin 2% Nasal 1 Application(s) Both Nostrils two times a day    MEDICATIONS  (PRN):  LORazepam   Injectable 0.5 milliGRAM(s) IV Push every 4 hours PRN Agitation  morphine Concentrate 2.5 milliGRAM(s) Oral every 4 hours PRN Moderate Pain (4 - 6)    Vital Signs Last 24 Hrs  T(F): 97.7 (27 Jul 2024 05:27), Max: 98.8 (26 Jul 2024 13:00)  HR: 84 (27 Jul 2024 05:27) (84 - 99)  BP: 122/66 (27 Jul 2024 05:27) (112/76 - 122/89)  RR: 18 (27 Jul 2024 05:27) (18 - 23)  SpO2: 93% (27 Jul 2024 05:27) (93% - 98%)  I&O's Summary    26 Jul 2024 07:01  -  27 Jul 2024 07:00  --------------------------------------------------------  IN: 1475 mL / OUT: 0 mL / NET: 1475 mL      BMI (kg/m2): 22.7 (07-22-24 @ 19:11)  PHYSICAL EXAM:  General: NAD, making noises  Does not follow commands  Lying in fetal position  Neck: Supple, No JVD, no thyroidomegaly  Lungs: Clear to auscultation bilaterally, no wheezes, no rales, no rhonchi, good inspiratory effort  Cardio: RRR, S1/S2, No murmurs  Abdomen: Soft, Nontender, Nondistended; Bowel sounds present  Extremities: No calf tenderness, No pitting edema, no skin changes    LABS:                        7.5    6.62  )-----------( 143      ( 26 Jul 2024 05:00 )             24.2       07-26    146  |  111  |  51  ----------------------------<  108  3.3   |  24  |  2.32    Ca    8.9      26 Jul 2024 05:00  Phos  2.6     07-26  Mg     2.2     07-26     PT/INR - ( 25 Jul 2024 09:01 )   PT: 13.8 sec;   INR: 1.22 ratio    TT - ( 25 Jul 2024 16:18 )  PTT:125.8 sec     Urinalysis Basic - ( 26 Jul 2024 05:00 )    Color: x / Appearance: x / SG: x / pH: x  Gluc: 108 mg/dL / Ketone: x  / Bili: x / Urobili: x   Blood: x / Protein: x / Nitrite: x   Leuk Esterase: x / RBC: x / WBC x   Sq Epi: x / Non Sq Epi: x / Bacteria: x    Culture - Urine (collected 23 Jul 2024 08:00)  Source: Clean Catch None  Final Report (24 Jul 2024 18:29):    >=3 organisms. Probable collection contamination.    Culture - Blood (collected 22 Jul 2024 20:55)  Source: .Blood Blood-Peripheral  Preliminary Report (27 Jul 2024 06:01):    No growth at 4 days    Culture - Blood (collected 22 Jul 2024 20:50)  Source: .Blood Blood-Peripheral  Preliminary Report (27 Jul 2024 06:01):    No growth at 4 days        COVID-19 PCR: NotDetec (10-20-22 @ 06:23)  COVID-19 PCR: NotDetec (10-13-22 @ 12:20)
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
## 2024-07-27 NOTE — PROGRESS NOTE ADULT - ASSESSMENT
Physical Examination:  GENERAL:               Alert, +verbal No acute distress.    HEENT:                   No JVD, Dry MM  PULM:                     Bilateral air entry, Clear to auscultation bilaterally, no significant sputum production, +Rales, No Rhonchi, No Wheezing  CVS:                         S1, S2,  +Murmur  ABD:                        Soft,  distended, nontender, normoactive bowel sounds,   EXT:                         No edema, nontender, No Cyanosis or Clubbing    NEURO:                   alert confused,  verbal does follow commands   PSYC:                      Calm, No Insight.        Assessment  Metabolic Encephelopathy - Improving  Shock combination of both septic and hypovolumic requiring pressors  UTI  MARGARET with normal baseline with hypernatremia. - improving  New Afib  Abnormal CT chest abd pelvis      - Hiatal hernia     - Nonobstructive right renal stone versus vascular calcification.     - Acute or subacute L1 fracture.     - Left femoral subcapital fracture with overlying soft tissue contusion, likely acute.     - Markedly dilated rectum with perirectal stranding, concerning for stercoral colitis.     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    Underlying HTN, DM2, High chol, Hypothyroidism           Plan  now on comfort measures  no ngt   no a/c  no IVF    afib rate controlled  cardio eval called    continue care on medical floor  reconsult as needed

## 2024-07-27 NOTE — PROGRESS NOTE ADULT - ASSESSMENT
89F PMHx HTN, HLD, T2DM, Hypothyroidism presents from Facility with AMS, decreased PO intake x1wk.      #Metabolic Encephelopathy   #Shock combination of both septic and hypovolumic requiring pressors  #UTI  #MARGARET, improving  #Normocytic anemia  #Hypernatremia, hypokalemia  #New Afib  #Abnormal CT chest abd pelvis      - Hiatal hernia     - Nonobstructive right renal stone versus vascular calcification.     - Acute or subacute L1 fracture.     - Left femoral subcapital fracture with overlying soft tissue contusion, likely acute.     - Markedly dilated rectum with perirectal stranding, concerning for stercoral colitis.     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  #Underlying HTN, DM2, High chol, Hypothyroidism         Downgrade from  MICU  Had a meeting with Norris at bedside, son  Agreeable to comfort care at this time  Hospice consult placed  Does not want IVF, IV abx  Morphine and ativan prn  Pureed diet with thick liquids  DNR/DNI  Expected death    Son Norris [phone]

## 2024-07-28 LAB
CULTURE RESULTS: SIGNIFICANT CHANGE UP
CULTURE RESULTS: SIGNIFICANT CHANGE UP
SPECIMEN SOURCE: SIGNIFICANT CHANGE UP
SPECIMEN SOURCE: SIGNIFICANT CHANGE UP

## 2024-07-28 PROCEDURE — 99233 SBSQ HOSP IP/OBS HIGH 50: CPT

## 2024-07-28 RX ADMIN — MUPIROCIN CALCIUM 1 APPLICATION(S): 20 CREAM TOPICAL at 05:17

## 2024-07-28 RX ADMIN — MUPIROCIN CALCIUM 1 APPLICATION(S): 20 CREAM TOPICAL at 17:40

## 2024-07-28 NOTE — PROGRESS NOTE ADULT - SUBJECTIVE AND OBJECTIVE BOX
Patient is a 89y old  Female who presents with a chief complaint of failure to thrive     Denies chest pain, SOB  REports feeling hungry  Patient seen and examined at bedside.    ALLERGIES:  No Known Allergies    MEDICATIONS  (STANDING):  mupirocin 2% Nasal 1 Application(s) Both Nostrils two times a day    MEDICATIONS  (PRN):  LORazepam   Injectable 0.5 milliGRAM(s) IV Push every 4 hours PRN Agitation  morphine Concentrate 2.5 milliGRAM(s) Oral every 4 hours PRN Moderate Pain (4 - 6)    Vital Signs Last 24 Hrs  T(F): 97.9 (28 Jul 2024 05:02), Max: 97.9 (28 Jul 2024 05:02)  HR: 81 (28 Jul 2024 05:02) (81 - 81)  BP: 121/63 (28 Jul 2024 05:02) (121/63 - 121/63)  RR: 18 (28 Jul 2024 05:02) (18 - 18)  SpO2: 92% (28 Jul 2024 05:02) (92% - 92%)  I&O's Summary    27 Jul 2024 07:01  -  28 Jul 2024 07:00  --------------------------------------------------------  IN: 60 mL / OUT: 0 mL / NET: 60 mL    PHYSICAL EXAM:  General: NAD, A/O  appears contracted, pleasant, comfortable  Dry MM  ENT: MMM, no scleral icterus  Neck: Supple, No JVD, no thyroidomegaly  Lungs: Clear to auscultation bilaterally, no wheezes, no rales, no rhonchi, good inspiratory effort  Cardio: RRR, S1/S2, No murmurs  Abdomen: Soft, Nontender, Nondistended; Bowel sounds present  Extremities: No calf tenderness, No pitting edema, no skin changes    LABS:                        7.5    6.62  )-----------( 143      ( 26 Jul 2024 05:00 )             24.2       07-26    146  |  111  |  51  ----------------------------<  108  3.3   |  24  |  2.32    Ca    8.9      26 Jul 2024 05:00  Phos  2.6     07-26  Mg     2.2     07-26    PTT - ( 25 Jul 2024 16:18 )  PTT:125.8 sec     Urinalysis Basic - ( 26 Jul 2024 05:00 )    Color: x / Appearance: x / SG: x / pH: x  Gluc: 108 mg/dL / Ketone: x  / Bili: x / Urobili: x   Blood: x / Protein: x / Nitrite: x   Leuk Esterase: x / RBC: x / WBC x   Sq Epi: x / Non Sq Epi: x / Bacteria: x    Culture - Urine (collected 23 Jul 2024 08:00)  Source: Clean Catch None  Final Report (24 Jul 2024 18:29):    >=3 organisms. Probable collection contamination.    Culture - Blood (collected 22 Jul 2024 20:55)  Source: .Blood Blood-Peripheral  Final Report (28 Jul 2024 06:00):    No growth at 5 days    Culture - Blood (collected 22 Jul 2024 20:50)  Source: .Blood Blood-Peripheral  Final Report (28 Jul 2024 06:00):    No growth at 5 days    COVID-19 PCR: NotDetec (10-20-22 @ 06:23)  COVID-19 PCR: NotDetec (10-13-22 @ 12:20)

## 2024-07-28 NOTE — PROGRESS NOTE ADULT - ASSESSMENT
89F PMHx HTN, HLD, T2DM, Hypothyroidism presents from Facility with AMS, decreased PO intake x1wk.      #Metabolic Encephelopathy   #Shock combination of both septic and hypovolumic requiring pressors  #UTI  #MARGARET, improving  #Normocytic anemia  #Hypernatremia, hypokalemia  #New Afib  #Abnormal CT chest abd pelvis   #Underlying HTN, DM2, High chol, Hypothyroidism         Downgrade from  MICU 7/26  Had a meeting with Norris at bedside, son  Agreeable to comfort care at this time  Hospice consult placed; would like hospital bed and CARIN mattress  Does not want IVF, IV abx  Morphine and ativan prn  Pureed diet with thick liquids  DNR/DNI  Expected death    German Pisano [phone]

## 2024-07-29 PROCEDURE — 99232 SBSQ HOSP IP/OBS MODERATE 35: CPT

## 2024-07-29 RX ADMIN — MUPIROCIN CALCIUM 1 APPLICATION(S): 20 CREAM TOPICAL at 17:52

## 2024-07-29 RX ADMIN — MUPIROCIN CALCIUM 1 APPLICATION(S): 20 CREAM TOPICAL at 05:27

## 2024-07-29 NOTE — PROGRESS NOTE ADULT - ASSESSMENT
89F PMHx HTN, HLD, T2DM, Hypothyroidism presents from Facility with AMS, decreased PO intake x1wk.      #Metabolic Encephelopathy   #Shock combination of both septic and hypovolumic requiring pressors  #UTI  #MARGARET, improving  #Normocytic anemia  #Hypernatremia, hypokalemia  #New Afib  #Abnormal CT chest abd pelvis   #Underlying HTN, DM2, High chol, Hypothyroidism         Downgrade from  MICU 7/26  Had a meeting with Norris at bedside, son  Agreeable to comfort care at this time  Hospice consult placed; would like hospital bed and CARIN mattress  Does not want IVF, IV abx  Morphine and ativan prn  Pureed diet with thick liquids  DNR/DNI  Expected death    German Pisano [phone]

## 2024-07-29 NOTE — PROGRESS NOTE ADULT - SUBJECTIVE AND OBJECTIVE BOX
Patient is a 89y old  Female who presents with a chief complaint of failure to thrive    No events overnight  Feels hungry  Denies chest pain, SOB  Patient seen and examined at bedside.    ALLERGIES:  No Known Allergies    MEDICATIONS  (STANDING):  mupirocin 2% Nasal 1 Application(s) Both Nostrils two times a day    MEDICATIONS  (PRN):  LORazepam   Injectable 0.5 milliGRAM(s) IV Push every 4 hours PRN Agitation  morphine Concentrate 2.5 milliGRAM(s) Oral every 4 hours PRN Moderate Pain (4 - 6)    Vital Signs Last 24 Hrs  T(F): 97.1 (29 Jul 2024 05:02), Max: 97.6 (28 Jul 2024 19:30)  HR: 76 (29 Jul 2024 05:02) (72 - 76)  BP: 133/67 (29 Jul 2024 05:02) (76/58 - 133/67)  RR: 18 (29 Jul 2024 05:02) (18 - 18)  SpO2: 96% (29 Jul 2024 05:02) (96% - 97%)  I&O's Summary    28 Jul 2024 07:01  -  29 Jul 2024 07:00  --------------------------------------------------------  IN: 4 mL / OUT: 300 mL / NET: -296 mL    PHYSICAL EXAM:  General: NAD, A/O   Dry MM  ENT: no scleral icterus  Neck: Supple, No JVD, no thyroidomegaly  Lungs: Clear to auscultation bilaterally, no wheezes, no rales, no rhonchi, good inspiratory effort  Cardio: RRR, S1/S2, No murmurs  Abdomen: Soft, Nontender, Nondistended; Bowel sounds present  Extremities: No calf tenderness, No pitting edema, no skin changes    LABS:      Culture - Urine (collected 23 Jul 2024 08:00)  Source: Clean Catch None  Final Report (24 Jul 2024 18:29):    >=3 organisms. Probable collection contamination.    Culture - Blood (collected 22 Jul 2024 20:55)  Source: .Blood Blood-Peripheral  Final Report (28 Jul 2024 06:00):    No growth at 5 days    Culture - Blood (collected 22 Jul 2024 20:50)  Source: .Blood Blood-Peripheral  Final Report (28 Jul 2024 06:00):    No growth at 5 days    COVID-19 PCR: NotDetec (10-20-22 @ 06:23)  COVID-19 PCR: NotDetec (10-13-22 @ 12:20)

## 2024-07-30 ENCOUNTER — TRANSCRIPTION ENCOUNTER (OUTPATIENT)
Age: 89
End: 2024-07-30

## 2024-07-30 PROCEDURE — 99232 SBSQ HOSP IP/OBS MODERATE 35: CPT

## 2024-07-30 RX ORDER — ALPRAZOLAM 0.5 MG
1 TABLET ORAL
Qty: 20 | Refills: 0
Start: 2024-07-30

## 2024-07-30 RX ORDER — MIRTAZAPINE 15 MG
1 TABLET ORAL
Qty: 30 | Refills: 0
Start: 2024-07-30

## 2024-07-30 RX ORDER — LEVOTHYROXINE SODIUM 175 MCG
1 TABLET ORAL
Refills: 0 | DISCHARGE

## 2024-07-30 RX ORDER — FAMOTIDINE 40 MG/1
1 TABLET, FILM COATED ORAL
Refills: 0 | DISCHARGE

## 2024-07-30 RX ORDER — FUROSEMIDE 10 MG/ML
1 INJECTION, SOLUTION INTRAVENOUS
Refills: 0 | DISCHARGE

## 2024-07-30 RX ADMIN — MUPIROCIN CALCIUM 1 APPLICATION(S): 20 CREAM TOPICAL at 05:09

## 2024-07-30 NOTE — DISCHARGE NOTE PROVIDER - HOSPITAL COURSE
BRIEF HOSPITAL COURSE:  89F PMHx HTN, HLD, T2DM, Hypothyroidism presents from Facility with AMS, decreased PO intake x1wk.   CT Head negative for acute intracranial pathology. CT Chest with L side atelectasis. CT A/P with possible stercoral colitis.   Noted to be hypoxic and placed on NRB.   In ER, labs significant for Hgb 7.2/ Plt 143, Na 161, Cl 120, BUN/SCr 90/4.19.   MICU consulted for further management.      BRIEF HOSPITAL COURSE:  89F PMHx HTN, HLD, T2DM, Hypothyroidism presents from Facility with AMS, decreased PO intake x1wk.   CT Head negative for acute intracranial pathology. CT Chest with L side atelectasis. CT A/P with possible stercoral colitis.   Noted to be hypoxic and placed on NRB.   In ER, labs significant for Hgb 7.2/ Plt 143, Na 161, Cl 120, BUN/SCr 90/4.19.   MICU consulted for further management.     Patient noted to have Sepsis, left femoral fracture, L1 fracture, atrial fibrillation  Cardiology, orthopedics consulted  Was started on zosyn, CTA negative for PE    Family had a meeting with palliative care; decision was made for hospice  Patient was downgraded to medicine  Stable at this time for home hospice

## 2024-07-30 NOTE — PROGRESS NOTE ADULT - ASSESSMENT
89F PMHx HTN, HLD, T2DM, Hypothyroidism presents from Facility with AMS, decreased PO intake x1wk.      #Metabolic Encephelopathy   #Shock combination of both septic and hypovolumic requiring pressors  #UTI  #MARGARET, improving  #Normocytic anemia  #Hypernatremia, hypokalemia  #New Afib  #Abnormal CT chest abd pelvis   #Underlying HTN, DM2, High chol, Hypothyroidism         Downgrade from  MICU 7/26  Plan for discharge home with hospice tomorrow pending DME delivery  Sent rx for morphine SL, xanax, remeron to [facility]  Spoke with Norris; in agreement with above  Does not want IVF, IV abx  Morphine and ativan prn  Pureed diet with thick liquids  DNR/DNI  Expected death    Son Norris [phone]

## 2024-07-30 NOTE — PROGRESS NOTE ADULT - SUBJECTIVE AND OBJECTIVE BOX
Patient is a 89y old  Female who presents with a chief complaint of failure to thrive (26 Jul 2024 12:27)    NO events overnight  Appears comfortable according to nursing staff  Patient seen and examined at bedside.    ALLERGIES:  No Known Allergies    MEDICATIONS  (STANDING):    MEDICATIONS  (PRN):  LORazepam   Injectable 0.5 milliGRAM(s) IV Push every 4 hours PRN Agitation  morphine Concentrate 2.5 milliGRAM(s) Oral every 4 hours PRN Moderate Pain (4 - 6)    Vital Signs Last 24 Hrs  T(F): 97.6 (30 Jul 2024 05:31), Max: 97.9 (29 Jul 2024 13:28)  HR: 99 (30 Jul 2024 05:31) (70 - 99)  BP: 143/59 (30 Jul 2024 05:31) (112/65 - 143/59)  RR: 18 (30 Jul 2024 05:31) (18 - 18)  SpO2: 99% (30 Jul 2024 05:31) (92% - 99%)  I&O's Summary    30 Jul 2024 07:01  -  30 Jul 2024 12:13  --------------------------------------------------------  IN: 150 mL / OUT: 0 mL / NET: 150 mL    PHYSICAL EXAM:  General: NAD, A/O  ENT: MMM, no scleral icterus  Neck: Supple, No JVD, no thyroidomegaly  Lungs: Clear to auscultation bilaterally, no wheezes, no rales, no rhonchi, good inspiratory effort  Cardio: RRR, S1/S2, No murmurs  Abdomen: Soft, Nontender, Nondistended; Bowel sounds present  Extremities: No calf tenderness, No pitting edema, no skin changes    LABS:      COVID-19 PCR: NotDetec (10-20-22 @ 06:23)  COVID-19 PCR: NotDetec (10-13-22 @ 12:20)

## 2024-07-30 NOTE — DISCHARGE NOTE PROVIDER - DETAILS OF MALNUTRITION DIAGNOSIS/DIAGNOSES
This patient has been assessed with a concern for Malnutrition and was treated during this hospitalization for the following Nutrition diagnosis/diagnoses:     -  07/23/2024: Severe protein-calorie malnutrition

## 2024-07-30 NOTE — DISCHARGE NOTE PROVIDER - NSDCMRMEDTOKEN_GEN_ALL_CORE_FT
mirtazapine 15 mg oral tablet: 1 tab(s) orally once a day (at bedtime)  morphine 20 mg/mL oral concentrate: 0.13 milliliter(s) orally every 4 hours as needed for Moderate Pain (4 - 6) hospice patiient, comfort care MDD: not more than 0.52ml/day  Xanax 0.25 mg oral tablet: 1 tab(s) orally 4 times a day Hospice patient, comfort care MDD: not more than four a day   mirtazapine 15 mg oral tablet: 1 tab(s) orally once a day (at bedtime)  morphine 20 mg/mL oral concentrate: 0.13 milliliter(s) orally every 4 hours as needed for Moderate Pain (4 - 6) hospice patient MDD: Not more than  0.78ml in 24hrs  Xanax 0.25 mg oral tablet: 1 tab(s) orally 4 times a day MDD: not more than four a day

## 2024-07-30 NOTE — DISCHARGE NOTE PROVIDER - [id number]_CORE_FT
PRINCIPAL DISCHARGE DIAGNOSIS  Diagnosis: AMS (altered mental status)  Assessment and Plan of Treatment:       SECONDARY DISCHARGE DIAGNOSES  Diagnosis: Dehydration  Assessment and Plan of Treatment:     Diagnosis: Dehydration with hypernatremia  Assessment and Plan of Treatment:     Diagnosis: MARGARET (acute kidney injury)  Assessment and Plan of Treatment:

## 2024-07-31 PROCEDURE — 99232 SBSQ HOSP IP/OBS MODERATE 35: CPT

## 2024-07-31 NOTE — PROGRESS NOTE ADULT - TIME BILLING
- Ordering, reviewing, and interpreting labs, testing, and imaging.  - Independently obtaining a review of systems and performing a physical exam  - Reviewing consultant documentation/recommendations.  - Counselling and educating patient regarding interpretation of aforementioned items and plan of care.
Time spent includes direct patient care  (interview and examination of patient), discussion with other providers, support staff and/or patient's family members, review of medical records, ordering diagnostic tests and analyzing results, and documentation, excluding time spent in ACP discussions.
- Ordering, reviewing, and interpreting labs, testing, and imaging.  - Independently obtaining a review of systems and performing a physical exam  - Reviewing consultant documentation/recommendations.  - Counselling and educating patient regarding interpretation of aforementioned items and plan of care.
Time spent includes direct patient care  (interview and examination of patient), discussion with other providers, support staff and/or patient's family members, review of medical records, ordering diagnostic tests and analyzing results, and documentation, excluding time spent in ACP discussions.

## 2024-07-31 NOTE — CHART NOTE - NSCHARTNOTEFT_GEN_A_CORE
Brief Nutrition Follow Up Note:    Patient pending hospice. Currently prescribed a pureed diet w/ mildly thick liquids for comfort feeds. No further nutrition intervention at this time.    RD to remain available:  Norris Powers RD  Ext: [phone] or via MS TEAMS
Nutrition Follow Up Note  Hospital Course (Per Electronic Medical Record):   Source: Medical Record [X] MD/NP [X]  Nursing Staff [X]     Diet: NPO     Patient remains NPO as per SLP recommendations , D5% LR @ 100ml/hr , labs reviewed Potassium repleted . Cr/ BUN slowing improving Palliative note reviewed, son not receptive to PEG however ? NGT until patient more appropriate for po diet. would suggest initiate if son agreeable to NGT feds of Jevity 1.5 @ 25ml/hr advance q 8 hrs to 50ml/hr x 18 hrs which will provide 1350kcals, 57gmsprotein ,[id number] with 200ml q 6hrs , monitor phos, Mg , potassium for possible refeeding     Current Weight:  (7/25) 108/49kg , admit 119/54kg     Pertinent Medications: MEDICATIONS  (STANDING):  albumin human 25% IVPB 100 milliLiter(s) IV Intermittent every 6 hours  atorvastatin 80 milliGRAM(s) Oral at bedtime  clopidogrel Tablet 75 milliGRAM(s) Oral daily  dextrose 5% + lactated ringers. 1000 milliLiter(s) (100 mL/Hr) IV Continuous <Continuous>  heparin  Infusion.  Unit(s)/Hr (10 mL/Hr) IV Continuous <Continuous>  levothyroxine Injectable 84 MICROGram(s) IV Push at bedtime  mupirocin 2% Nasal 1 Application(s) Both Nostrils two times a day  pantoprazole  Injectable 40 milliGRAM(s) IV Push daily  piperacillin/tazobactam IVPB.. 3.375 Gram(s) IV Intermittent every 12 hours  potassium chloride  10 mEq/100 mL IVPB 10 milliEquivalent(s) IV Intermittent every 1 hour    MEDICATIONS  (PRN):      Pertinent Labs:  07-25 Na141 mmol/L Glu 142 mg/dL<H> K+ 3.7 mmol/L Cr  3.01 mg/dL<H> BUN 70 mg/dL<H> 07-25 Phos 3.4 mg/dL 07-23 Alb 2.5 g/dL<L>  Hgb8.3g/dl<L>, Hct 26.5% <L> Mg 1.8mg/dl, Mg  1.8mg/dl,       Skin: stage I sacrum     Edema: none    Last BM: (7/24) (+) diarrhea as per RN     Estimated Needs:   [X] No Change since Previous Assessment      Previous Nutrition Diagnosis: Severe protein calorie malnutrition     Nutrition Diagnosis is [X] Ongoing       New Nutrition Diagnosis: [X] Not Applicable    Interventions:   1. monitor NPO status        Monitoring & Evaluation: will monitor:  [X] Weights   [X] Follow Up (Per Protocol)      RD to follow as per Nutrition protocol  Yesenia Lucas RDN
Patient's son, Norris, called throughout evening to give updates on patient's clinical status.   Norris states that currently he is the [facility] and should be primary contact as patient's other son, Khris, is out of the [place].    Norris can be reached at [phone].

## 2024-07-31 NOTE — PROGRESS NOTE ADULT - ASSESSMENT
89F PMHx HTN, HLD, T2DM, Hypothyroidism presents from Facility with AMS, decreased PO intake x1wk.    #Metabolic Encephelopathy   #Shock combination of both septic and hypovolumic requiring pressors  #UTI  #MARGARET, improving  #Normocytic anemia  #Hypernatremia, hypokalemia  #New Afib  #Abnormal CT chest abd pelvis   #Underlying HTN, DM2, High chol, Hypothyroidism         Downgrade from  MICU 7/26  Plan for discharge home with hospice on Friday; waiting for aid services to be in place  Sent rx for morphine SL, xanax, remeron to [facility]  Spoke with Norris; in agreement with above  Does not want IVF, IV abx  Morphine and ativan prn  Pureed diet with thick liquids  DNR/DNI  Expected death    Son Norris [phone]

## 2024-07-31 NOTE — PROGRESS NOTE ADULT - SUBJECTIVE AND OBJECTIVE BOX
Patient is a 89y old  Female who presents with a chief complaint of failure to thrive    No events overnight  Appears comfortable      Patient seen and examined at bedside.    ALLERGIES:  No Known Allergies    MEDICATIONS  (STANDING):    MEDICATIONS  (PRN):  LORazepam   Injectable 0.5 milliGRAM(s) IV Push every 4 hours PRN Agitation  morphine Concentrate 2.5 milliGRAM(s) Oral every 4 hours PRN Moderate Pain (4 - 6)    Vital Signs Last 24 Hrs  T(F): 97.5 (31 Jul 2024 05:45), Max: 99.6 (30 Jul 2024 20:25)  HR: 96 (31 Jul 2024 05:45) (80 - 96)  BP: 125/75 (31 Jul 2024 05:45) (101/56 - 125/75)  RR: 18 (31 Jul 2024 05:45) (18 - 18)  SpO2: 96% (31 Jul 2024 05:45) (90% - 96%)  I&O's Summary    30 Jul 2024 07:01  -  31 Jul 2024 07:00  --------------------------------------------------------  IN: 150 mL / OUT: 100 mL / NET: 50 mL    PHYSICAL EXAM:  General: NAD, A/O  ENT: MMM, no scleral icterus  Neck: Supple, No JVD, no thyroidomegaly  Lungs: Clear to auscultation bilaterally, no wheezes, no rales, no rhonchi, good inspiratory effort  Cardio: RRR, S1/S2, No murmurs  Abdomen: Soft, Nontender, Nondistended; Bowel sounds present  Extremities: No calf tenderness, No pitting edema, no skin changes    LABS:      COVID-19 PCR: NotDetec (10-20-22 @ 06:23)  COVID-19 PCR: NotDetec (10-13-22 @ 12:20)

## 2024-08-01 VITALS
HEART RATE: 90 BPM | RESPIRATION RATE: 16 BRPM | OXYGEN SATURATION: 98 % | SYSTOLIC BLOOD PRESSURE: 115 MMHG | TEMPERATURE: 98 F | DIASTOLIC BLOOD PRESSURE: 69 MMHG

## 2024-08-01 PROCEDURE — 99232 SBSQ HOSP IP/OBS MODERATE 35: CPT

## 2024-08-01 NOTE — PROGRESS NOTE ADULT - PROVIDER SPECIALTY LIST ADULT
Infectious Disease
Critical Care
Hospitalist
Infectious Disease
Palliative Care
Infectious Disease
MICU
Critical Care
Hospitalist
Critical Care
Critical Care
Hospitalist
Hospitalist
Palliative Care

## 2024-08-01 NOTE — PROGRESS NOTE ADULT - NUTRITIONAL ASSESSMENT
This patient has been assessed with a concern for Malnutrition and has been determined to have a diagnosis/diagnoses of Severe protein-calorie malnutrition.    This patient is being managed with:   Diet NPO-  Entered: Jul 24 2024  8:34AM  
This patient has been assessed with a concern for Malnutrition and has been determined to have a diagnosis/diagnoses of Severe protein-calorie malnutrition.    This patient is being managed with:   Diet Pureed-  Mildly Thick Liquids (MILDTHICKLIQS)  Entered: Jul 27 2024  9:45AM  
This patient has been assessed with a concern for Malnutrition and has been determined to have a diagnosis/diagnoses of Severe protein-calorie malnutrition.    This patient is being managed with:   Diet Pureed-  Mildly Thick Liquids (MILDTHICKLIQS)  Entered: Jul 27 2024  9:45AM  
This patient has been assessed with a concern for Malnutrition and has been determined to have a diagnosis/diagnoses of Severe protein-calorie malnutrition.    This patient is being managed with:   Diet NPO-  Entered: Jul 24 2024  8:34AM  
This patient has been assessed with a concern for Malnutrition and has been determined to have a diagnosis/diagnoses of Severe protein-calorie malnutrition.    This patient is being managed with:   Diet NPO-  Entered: Jul 24 2024  8:34AM  
This patient has been assessed with a concern for Malnutrition and has been determined to have a diagnosis/diagnoses of Severe protein-calorie malnutrition.    This patient is being managed with:   Diet Pureed-  Mildly Thick Liquids (MILDTHICKLIQS)  Entered: Jul 27 2024  9:45AM  
This patient has been assessed with a concern for Malnutrition and has been determined to have a diagnosis/diagnoses of Severe protein-calorie malnutrition.    This patient is being managed with:   Diet NPO with Tube Feed-  Tube Feeding Modality: Nasogastric  Jevity 1.5 Chuy  Total Volume for 24 Hours (mL): 900  Continuous  Starting Tube Feed Rate {mL per Hour}: 20  Increase Tube Feed Rate by (mL): 10     Every 10 hours  Until Goal Tube Feed Rate (mL per Hour): 50  Tube Feed Duration (in Hours): 18  Tube Feed Start Time: 06:00  Tube Feed Stop Time: 23:59  Free Water Flush  Bolus   Total Volume per Flush (mL): 200   Frequency: Every 6 Hours  Entered: Jul 23 2024 10:52AM  
This patient has been assessed with a concern for Malnutrition and has been determined to have a diagnosis/diagnoses of Severe protein-calorie malnutrition.    This patient is being managed with:   Diet NPO-  Entered: Jul 22 2024 11:06PM    The following pending diet order is being considered for treatment of Severe protein-calorie malnutrition:  Diet NPO with Tube Feed-  Tube Feeding Modality: Nasogastric  Jevity 1.5 Chuy  Total Volume for 24 Hours (mL): 900  Continuous  Starting Tube Feed Rate {mL per Hour}: 20  Increase Tube Feed Rate by (mL): 10     Every 10 hours  Until Goal Tube Feed Rate (mL per Hour): 50  Tube Feed Duration (in Hours): 18  Tube Feed Start Time: 06:00  Tube Feed Stop Time: 23:59  Free Water Flush  Bolus   Total Volume per Flush (mL): 200   Frequency: Every 6 Hours  Entered: Jul 23 2024 10:52AM  
This patient has been assessed with a concern for Malnutrition and has been determined to have a diagnosis/diagnoses of Severe protein-calorie malnutrition.    This patient is being managed with:   Diet Pureed-  Mildly Thick Liquids (MILDTHICKLIQS)  Entered: Jul 27 2024  9:45AM  
This patient has been assessed with a concern for Malnutrition and has been determined to have a diagnosis/diagnoses of Severe protein-calorie malnutrition.    This patient is being managed with:   Diet NPO-  Entered: Jul 24 2024  8:34AM  
This patient has been assessed with a concern for Malnutrition and has been determined to have a diagnosis/diagnoses of Severe protein-calorie malnutrition.    This patient is being managed with:   Diet Pureed-  Mildly Thick Liquids (MILDTHICKLIQS)  Entered: Jul 27 2024  9:45AM  
This patient has been assessed with a concern for Malnutrition and has been determined to have a diagnosis/diagnoses of Severe protein-calorie malnutrition.    This patient is being managed with:   Diet NPO-  Entered: Jul 24 2024  8:34AM

## 2024-08-01 NOTE — PROGRESS NOTE ADULT - ASSESSMENT
89F PMHx HTN, HLD, T2DM, Hypothyroidism presents from Facility with AMS, decreased PO intake x1wk.    #Metabolic Encephelopathy   #Shock combination of both septic and hypovolumic requiring pressors  #UTI  #L femoral subcapital fracture  #MARGARET, improving  #Normocytic anemia  #Hypernatremia, hypokalemia  #New Afib  #Abnormal CT chest abd pelvis   #Underlying HTN, DM2, High chol, Hypothyroidism         Downgrade from  MICU 7/26  Plan for discharge home with hospice on Friday; waiting for aid services to be in place  Sent rx for morphine SL, xanax, remeron to [facility]  Spoke with Norris; in agreement with above  Does not want IVF, IV abx  Morphine and ativan prn  Pureed diet with thick liquids  DNR/DNI  Expected death    Son Norris [phone]   89F PMHx HTN, HLD, T2DM, Hypothyroidism presents from Facility with AMS, decreased PO intake x1wk.    #Metabolic Encephelopathy   #Shock combination of both septic and hypovolumic requiring pressors  #UTI  #L femoral subcapital fracture  #MARGARET, improving  #Normocytic anemia  #Hypernatremia, hypokalemia  #New Afib  #Abnormal CT chest abd pelvis   #Underlying HTN, DM2, High chol, Hypothyroidism         Downgrade from  MICU 7/26  s/p 5 days of IV Zosyn  Plan for discharge home with hospice on Friday; waiting for aid services to be in place  [facility] morphine SL[facility]ax[facility]  Spoke with Norris; in agreement with above  Does not want IVF, IV abx  Morphine and ativan prn  Pureed diet with thick liquids  DNR/DNI  Expected death    Son Norris [phone]  spoke to Sheldon 8/1

## 2024-08-02 ENCOUNTER — TRANSCRIPTION ENCOUNTER (OUTPATIENT)
Age: 89
End: 2024-08-02

## 2024-08-02 LAB
CORTICOSTEROID BINDING GLOBULIN RESULT: 2 MG/DL — SIGNIFICANT CHANGE UP
CORTIS F/TOTAL MFR SERPL: 25 % — SIGNIFICANT CHANGE UP
CORTIS SERPL-MCNC: 17 UG/DL — SIGNIFICANT CHANGE UP
CORTISOL, FREE RESULT: 4.3 UG/DL — HIGH

## 2024-08-02 NOTE — DISCHARGE NOTE NURSING/CASE MANAGEMENT/SOCIAL WORK - NSDCVIVACCINE_GEN_ALL_CORE_FT
Tdap; 31-Aug-2017 09:27; Cheri Og); [employer]; [id number]; IntraMuscular; Deltoid Left.; 0.5 milliLiter(s); VIS (VIS Published: 09-May-2013, VIS Presented: 31-Aug-2017);

## 2024-08-02 NOTE — DISCHARGE NOTE NURSING/CASE MANAGEMENT/SOCIAL WORK - PATIENT PORTAL LINK FT
You can access the FollowMyHealth Patient Portal offered by [facility] by registering at the following website: [email or web address]. By joining [employer]’s FollowMyHealth portal, you will also be able to view your health information using other applications (apps) compatible with our system.

## 2024-09-29 PROCEDURE — 86901 BLOOD TYPING SEROLOGIC RH(D): CPT

## 2024-09-29 PROCEDURE — 86850 RBC ANTIBODY SCREEN: CPT

## 2024-09-29 PROCEDURE — 71045 X-RAY EXAM CHEST 1 VIEW: CPT

## 2024-09-29 PROCEDURE — 93005 ELECTROCARDIOGRAM TRACING: CPT

## 2024-09-29 PROCEDURE — 80202 ASSAY OF VANCOMYCIN: CPT

## 2024-09-29 PROCEDURE — 87640 STAPH A DNA AMP PROBE: CPT

## 2024-09-29 PROCEDURE — 80053 COMPREHEN METABOLIC PANEL: CPT

## 2024-09-29 PROCEDURE — P9047: CPT

## 2024-09-29 PROCEDURE — 85730 THROMBOPLASTIN TIME PARTIAL: CPT

## 2024-09-29 PROCEDURE — 36415 COLL VENOUS BLD VENIPUNCTURE: CPT

## 2024-09-29 PROCEDURE — 84443 ASSAY THYROID STIM HORMONE: CPT

## 2024-09-29 PROCEDURE — 84436 ASSAY OF TOTAL THYROXINE: CPT

## 2024-09-29 PROCEDURE — 71250 CT THORAX DX C-: CPT | Mod: MC

## 2024-09-29 PROCEDURE — 92610 EVALUATE SWALLOWING FUNCTION: CPT

## 2024-09-29 PROCEDURE — 83605 ASSAY OF LACTIC ACID: CPT

## 2024-09-29 PROCEDURE — 82962 GLUCOSE BLOOD TEST: CPT

## 2024-09-29 PROCEDURE — 82533 TOTAL CORTISOL: CPT

## 2024-09-29 PROCEDURE — 93306 TTE W/DOPPLER COMPLETE: CPT

## 2024-09-29 PROCEDURE — 81001 URINALYSIS AUTO W/SCOPE: CPT

## 2024-09-29 PROCEDURE — 84480 ASSAY TRIIODOTHYRONINE (T3): CPT

## 2024-09-29 PROCEDURE — 83036 HEMOGLOBIN GLYCOSYLATED A1C: CPT

## 2024-09-29 PROCEDURE — 99291 CRITICAL CARE FIRST HOUR: CPT

## 2024-09-29 PROCEDURE — 92526 ORAL FUNCTION THERAPY: CPT

## 2024-09-29 PROCEDURE — 84481 FREE ASSAY (FT-3): CPT

## 2024-09-29 PROCEDURE — 85025 COMPLETE CBC W/AUTO DIFF WBC: CPT

## 2024-09-29 PROCEDURE — 85610 PROTHROMBIN TIME: CPT

## 2024-09-29 PROCEDURE — 84484 ASSAY OF TROPONIN QUANT: CPT

## 2024-09-29 PROCEDURE — 83735 ASSAY OF MAGNESIUM: CPT

## 2024-09-29 PROCEDURE — 84100 ASSAY OF PHOSPHORUS: CPT

## 2024-09-29 PROCEDURE — 82803 BLOOD GASES ANY COMBINATION: CPT

## 2024-09-29 PROCEDURE — 96374 THER/PROPH/DIAG INJ IV PUSH: CPT

## 2024-09-29 PROCEDURE — 70450 CT HEAD/BRAIN W/O DYE: CPT | Mod: MC

## 2024-09-29 PROCEDURE — 87641 MR-STAPH DNA AMP PROBE: CPT

## 2024-09-29 PROCEDURE — 84449 ASSAY OF TRANSCORTIN: CPT

## 2024-09-29 PROCEDURE — 80048 BASIC METABOLIC PNL TOTAL CA: CPT

## 2024-09-29 PROCEDURE — 73501 X-RAY EXAM HIP UNI 1 VIEW: CPT

## 2024-09-29 PROCEDURE — 87040 BLOOD CULTURE FOR BACTERIA: CPT

## 2024-09-29 PROCEDURE — 94760 N-INVAS EAR/PLS OXIMETRY 1: CPT

## 2024-09-29 PROCEDURE — 86900 BLOOD TYPING SEROLOGIC ABO: CPT

## 2024-09-29 PROCEDURE — 85027 COMPLETE CBC AUTOMATED: CPT

## 2024-09-29 PROCEDURE — 82272 OCCULT BLD FECES 1-3 TESTS: CPT

## 2024-09-29 PROCEDURE — 74176 CT ABD & PELVIS W/O CONTRAST: CPT | Mod: MC

## 2024-09-29 PROCEDURE — 36600 WITHDRAWAL OF ARTERIAL BLOOD: CPT

## 2024-09-29 PROCEDURE — 84439 ASSAY OF FREE THYROXINE: CPT

## 2024-09-29 PROCEDURE — 87086 URINE CULTURE/COLONY COUNT: CPT

## 2025-06-06 NOTE — PROGRESS NOTE ADULT - NS ATTEND AMEND GEN_ALL_CORE FT
Patient did not attend group.    Date: 6/6/2025    Group Start Time: 1200  Group End Time: 1250  Group Topic: Music Therapy    ML 3W Maria Elena Perdomo    Motivational Songs Bingo!  Playlist Building/Song Share and Receptive Music Listening    Patients will be given a bingo board with a motivational song in each space in addition to a free space. Patients will listen to recorded music on shuffle and place bingo chips on the corresponding box. If patients get a bingo, they will be invited to select a song of their choice to listen to in full.     Focus: Building Positive Experiences  Goals: Improve/Maintain Cognitive Skills, Build Sense of Community, Improve Mood, Improve Self-Esteem, Improve Self-Expression, Improve Attention to Task, Decrease Impulsive Behaviors, Promote Reality Orientation     Signature: Maria Elena Garcia Licensed Professional Music Therapist (LPMT)  
AHRF due to acute on chronic diastolic CHF exacer, not COPD  UTI Ecoli  Pulm HTN noted  CKD  Anemia with iron deficiency    Will d/c prednisone  Change lasix to 40mg po daily, replace K, f/u cardiology  IV iron x 2 doses  C/w ceftriaxone x 3 doses total  c/w current medications    DVT PPX  AM labs  [facility] but PT recommends [facility]; will speak with son
AHRF due to acute on chronic diastolic CHF exacer, not COPD  Pulm HTN noted  CKD  Anemia with iron deficiency    Will d/c prednisone  Change lasix to 40mg IV daily, replace K, f/u cardiology  IV iron x 2 doses  c/w current medications  PT eval  DVT PPX  AM labs  DISP From [facility]
AHRF due to acute on chronic diastolic CHF exacer, not COPD  UTI Ecoli  MARGARET  Pulm HTN noted  CKD  Anemia with iron deficiency    Will switch back to oral lasix 40mg daily  Will consult renal given increase in creatinine  Increased toprol to 75mg daily  F/u cardiology  s/p IV iron x 2 doses  s/p ceftriaxone x 3 doses total  c/w current medications    DVT PPX  AM labs  DISP From [facility] but PT recommends [facility]
AHRF due to acute on chronic diastolic CHF exacer, not COPD  UTI Ecoli  Pulm HTN noted  CKD  Anemia with iron deficiency    Will ask cardiology to re-consult given increased SOB today and fever last night  CXR noted with increased fluid in left base; will repeat in AM  Already got lasix 40mg po at 6am ; will give lasix 20mg IVP now  Will start lasix 40mg IV daily in AM  F/u cardiology  IV iron x 2 doses  C/w ceftriaxone x 3 doses total; last dose today  c/w current medications    DVT PPX  AM labs  DISP From [facility] but PT recommends [facility]; will speak with son.
Acute on chronic diastolic heart failure  acute hypoxic respiratory failure
Acute on chronic diastolic heart failure  - improved, off O2  - for [facility]    medically stable for discharge.
Medically stable for discharge back to [facility]